# Patient Record
Sex: FEMALE | Race: WHITE | NOT HISPANIC OR LATINO | Employment: UNEMPLOYED | URBAN - METROPOLITAN AREA
[De-identification: names, ages, dates, MRNs, and addresses within clinical notes are randomized per-mention and may not be internally consistent; named-entity substitution may affect disease eponyms.]

---

## 2023-08-21 ENCOUNTER — TELEPHONE (OUTPATIENT)
Dept: PODIATRY | Facility: CLINIC | Age: 48
End: 2023-08-21
Payer: COMMERCIAL

## 2023-08-21 NOTE — TELEPHONE ENCOUNTER
Spoke with patient to help schedule appointment due to provider relocating to new site. Patient voice understanding to new appointment date time and location.

## 2023-09-11 ENCOUNTER — TELEPHONE (OUTPATIENT)
Dept: PODIATRY | Facility: CLINIC | Age: 48
End: 2023-09-11
Payer: COMMERCIAL

## 2023-09-11 NOTE — TELEPHONE ENCOUNTER
Called patient to make them aware that Dr. Dale will be in the [place] office not [place].  Left patient a message on her voicemail at 9/11/23 @ 3:22.     Kristen

## 2023-09-13 ENCOUNTER — TELEPHONE (OUTPATIENT)
Dept: PODIATRY | Facility: CLINIC | Age: 48
End: 2023-09-13

## 2023-09-13 ENCOUNTER — OFFICE VISIT (OUTPATIENT)
Dept: PODIATRY | Facility: CLINIC | Age: 48
End: 2023-09-13
Payer: COMMERCIAL

## 2023-09-13 ENCOUNTER — PATIENT MESSAGE (OUTPATIENT)
Dept: PODIATRY | Facility: CLINIC | Age: 48
End: 2023-09-13

## 2023-09-13 VITALS — HEIGHT: 67 IN | BODY MASS INDEX: 45.99 KG/M2 | WEIGHT: 293 LBS

## 2023-09-13 DIAGNOSIS — L60.0 INGROWN TOENAIL: Primary | ICD-10-CM

## 2023-09-13 PROCEDURE — 99999 PR PBB SHADOW E&M-EST. PATIENT-LVL III: CPT | Mod: PBBFAC,,, | Performed by: PODIATRIST

## 2023-09-13 PROCEDURE — 99999 PR PBB SHADOW E&M-EST. PATIENT-LVL III: ICD-10-PCS | Mod: PBBFAC,,, | Performed by: PODIATRIST

## 2023-09-13 PROCEDURE — 11765 NAIL REMOVAL: ICD-10-PCS | Mod: S$GLB,,, | Performed by: PODIATRIST

## 2023-09-13 PROCEDURE — 99213 OFFICE O/P EST LOW 20 MIN: CPT | Mod: 25,PBBFAC,PN | Performed by: PODIATRIST

## 2023-09-13 PROCEDURE — 99203 OFFICE O/P NEW LOW 30 MIN: CPT | Mod: 25,S$GLB,, | Performed by: PODIATRIST

## 2023-09-13 PROCEDURE — 99203 PR OFFICE/OUTPT VISIT, NEW, LEVL III, 30-44 MIN: ICD-10-PCS | Mod: 25,S$GLB,, | Performed by: PODIATRIST

## 2023-09-13 PROCEDURE — 11765 WEDGE EXCISION SKN NAIL FOLD: CPT | Mod: TA,PBBFAC,PN | Performed by: PODIATRIST

## 2023-09-13 RX ORDER — ALBUTEROL SULFATE 90 UG/1
2 AEROSOL, METERED RESPIRATORY (INHALATION) 2 TIMES DAILY PRN
COMMUNITY
Start: 2023-05-25

## 2023-09-13 RX ORDER — MONTELUKAST SODIUM 10 MG/1
10 TABLET ORAL DAILY
COMMUNITY
Start: 2023-05-24

## 2023-09-13 RX ORDER — ROSUVASTATIN CALCIUM 20 MG/1
20 TABLET, COATED ORAL DAILY
COMMUNITY
Start: 2023-05-24

## 2023-09-13 RX ORDER — OMEPRAZOLE 40 MG/1
40 CAPSULE, DELAYED RELEASE ORAL DAILY
COMMUNITY
Start: 2023-05-24

## 2023-09-13 NOTE — PATIENT INSTRUCTIONS
Instructions for Care after Ingrown Nail removal    General Information: Stay off your feet as much as possible today. You may wear a surgical shoe, sandal or any open toed shoe that does not squeeze, constrict or put pressure on your toe(s). Your toe(s) may remain numb for up to 2-24 hours after the procedure. Although most patients can wear a closed loose fitting shoe after the first week, the toe will heal faster the more you use the open toed shoe in the first 2-3  weeks. Please contact our office if you have any questions or concerns.    Bleeding: Slight bleeding, discoloration and drainage are normal. Due to the chemical used there may be some yellow-clear drainage coming from the toe for 2-3 weeks.    Discomfort: You can elevate your foot to help alleviate minor swelling, bleeding and discomfort. You may also take Advil, Tylenol or other over the counter pain medications to help alleviate pain. Call our office if the pain is not well controlled. Most patients have very little discomfort as long as they minimize their walking for the first 24 hours and do not bump the toe.    Removing the Bandage: Starting the day after the procedure, carefully remove the dressing and shower or bathe as normal. It is Ok to get the bandage soaking wet in the shower and when you remove it, it should not stick to the surgery site.    Dressing Options- Traditional Method:  1. Soaking two times a day in WARM water with Epsom salts or diluted Povidone Iodine  (Betadine) for 15-20 minutes. You will need to purchase these products from the pharmacy.  2. Dry toe then apply an antibiotic cream or ointment such as Neosporin or Polysporin plus or  Garamycin and cover with a 2 x 2 inch size gauze and then secure with a 1 inch band aid.  3. In the second week, take the dressing off at bedtime to air dry the toe.  4. If the toe is infected take the Antibiotic Pills as directed until finished.      Ingrown Toenail        What Is an Ingrown  Mood at 3 with 10 being the worse and found hearing others imput as most helpful today    Easily distracted     No SI    Has appropriate goals and to return to IOP tomorrow   Toenail?    When a toenail is ingrown, it is curved and grows into the skin, usually at the nail borders (the sides of the nail). This digging in of the nail irritates the skin, often creating pain, redness, swelling and warmth in the toe.    If an ingrown nail causes a break in the skin, bacteria may enter and cause an infection in the area, which is often marked by drainage and a foul odor. However, even if the toe is not painful, red, swollen or warm, a nail that curves downward into the skin can progress to an infection.    Ingrown toenail and normal toenail    Causes  Causes of ingrown toenails include:    Heredity. In many people, the tendency for ingrown toenails is inherited.  Trauma. Sometimes an ingrown toenail is the result of trauma, such as stubbing your toe, having an object fall on your toe or engaging in activities that involve repeated pressure on the toes, such as kicking or running.  Improper trimming. The most common cause of ingrown toenails is cutting your nails too short. This encourages the skin next to the nail to fold over the nail.   Improperly sized footwear. Ingrown toenails can result from wearing socks and shoes that are tight or short.  Nail conditions. Ingrown toenails can be caused by nail problems, such as fungal infections or losing a nail due to trauma.     Treatment  Sometimes initial treatment for ingrown toenails can be safely performed at home. However, home treatment is strongly discouraged if an infection is suspected or for those who have medical conditions that put feet at high risk, such as diabetes, nerve damage in the foot or poor circulation.        Ingrown toenail before and after treatment    Home Care  If you do not have an infection or any of the above medical conditions, you can soak your foot in room-temperature water (adding Epsom salt may be recommended by your doctor) and gently massage the side of the nail fold to help reduce the inflammation.    Avoid  attempting bathroom surgery. Repeated cutting of the nail can cause the condition to worsen over time. If your symptoms fail to improve, it is time to see a foot and ankle surgeon.    Physician Care  After examining the toe, the foot and ankle surgeon will select the treatment best suited for you. If an infection is present, an oral antibiotic may be prescribed.    Sometimes a minor surgical procedure, often performed in the office, will ease the pain and remove the offending nail. After applying a local anesthetic, the doctor removes part of the nails side border. Some nails may become ingrown again, requiring removal of the nail root.    Following the nail procedure, a light bandage will be applied. Most people experience very little pain after surgery and may resume normal activity the next day. If your surgeon has prescribed an oral antibiotic, be sure to take all the medication, even if your symptoms have improved.    Preventing Ingrown Toenails  Many cases of ingrown toenails may be prevented by:    Proper trimming. Cut toenails in a fairly straight line, and do not cut them too short. You should be able to get your fingernail under the sides and end of the nail.  Well-fitting shoes and socks. Do not wear shoes that are short or tight in the toe area. Avoid shoes that are loose because they too cause pressure on the toes, especially when running or walking briskly.               What You Should Know About Home Treatment  Do not cut a notch in the nail. Contrary to what some people believe, this does not reduce the tendency for the nail to curve downward.  Do not repeatedly trim nail borders. Repeated trimming does not change the way the nail grows and can make the condition worse.  Do not place cotton under the nail. Not only does this not relieve the pain, it provides a place for harmful bacteria to grow, resulting in infection.  Over-the-counter medications are ineffective. Topical medications may mask  the pain, but they do not correct the underlying problem.        Understanding Ingrown Toenails    An ingrown nail is the result of a nail growing into the skin that surrounds it. This often occurs at either edge of the big toe. Ingrown nails may be caused by improper trimming, inherited nail deformities, injuries, fungal infections, or pressure.  Symptoms  Ingrown nails may cause pain at the tip of the toe or all the way to the base of the toe. The pain is often worse while walking. An ingrown nail may also lead to infection, inflammation, or a more serious condition. If its infected, you might see pus or redness.  Evaluation  To determine the extent of your problem, your healthcare provider examines and possibly presses the painful area. If other problems are suspected, blood tests, cultures, and X-rays may be done as well.  Treatment  If the nail isnt infected, your healthcare provider may trim the corner of it to help relieve your symptoms. He or she may need to remove one side of your nail back to the cuticle. The base of the nail may then be treated with a chemical to keep the ingrown part from growing back. Severe infections or ingrown nails may require antibiotics and temporary or permanent removal of a portion of the nail. To prevent pain, a local anesthetic may be used in these procedures. This treatment is usually done at your healthcare provider's office.  Prevention  Many nail problems can be prevented by wearing the right shoes and trimming your nails properly. To help avoid infection, keep your feet clean and dry. If you have diabetes, talk with your healthcare provider before doing any foot self-care.  The right shoes: Get your feet measured (your size may change as you age). Wear shoes that are supportive and roomy enough for your toes to wiggle. Look for shoes made of natural materials such as leather, which allow your feet to breathe.  Proper trimming: To avoid problems, trim your toenails  straight across without cutting down into the corners. If you cant trim your own nails, ask your healthcare provider to do so for you.  Date Last Reviewed: 10/1/2016  © 4134-0810 [employer]. [address] [place] [address], [place] [address]. All rights reserved. This information is not intended as a substitute for professional medical care. Always follow your healthcare professional's instructions.

## 2023-09-13 NOTE — PROGRESS NOTES
[facility] - PODIATRY  [address], [address]  [place] [address]  Dept: [phone]  Dept Fax: [phone]    Hunter Acharya Jr., DPM     Assessment:   MDM    Coding  1. Ingrown toenail - Left Foot  Nail Removal          Plan:     Nail Removal    Date/Time: 9/13/2023 8:00 AM    Performed by: Hunter Acharya Jr., DPM  Authorized by: Hunter Acharya Jr., DPM    Consent Done?:  Yes (Verbal)  Time out: Immediately prior to the procedure a time out was called    Prep: patient was prepped and draped in usual sterile fashion    Location:     Location:  Left foot  Anesthesia:     Anesthesia:  Digital block    Local anesthetic:  Bupivacaine 0.5% without epinephrine    Anesthetic total (ml):  2  Procedure Details:     Preparation:  Skin prepped with alcohol, skin prepped with Betadine and sterile field established    Amount removed:  Complete    Wedge excision of skin of nail fold: Yes      Nail bed sutured?: No      Nail matrix removed:  None    Removed nail replaced and anchored: No      Dressing applied:  4x4, antibiotic ointment, gauze roll, petrolatum-impregnated gauze and dressing applied    Patient tolerance:  Patient tolerated the procedure well with no immediate complications      Erika was seen today for ingrown toenail.    Diagnoses and all orders for this visit:    Ingrown toenail - Left Foot  -     Nail Removal        -pt seen, evaluated, and managed  -dx discussed in detail. All questions/concerns addressed  -all tx options discussed. All alternatives, risks, benefits of all txs discussed  -The patient was educated regarding the above diagnosis.   -discussed ingrowing toenails and all tx options. Pt opts for avuslion  -px as above  -pt to perform epsom salt or betadine soaks once daily x 2wks. Written instructions dispensed  -keep toe covered with triple abx ointment + bandaid x 2wks  -given h/o recurrent ingrown, we discussed matrix px. Pt to think over  options    Rx dispensed: none  Referrals: none  -WB: wbat      Follow up in about 3 weeks (around 10/4/2023).    Subjective:      Patient ID: Erika Jenkins is a 48 y.o. female.    Chief Complaint:   Chief Complaint   Patient presents with    Ingrown Toenail     Left and right great toe pain  Left great toe ingrown  Right toe nail had an accident and it never grown properly        CC - ingrown nail: Patient presents to the clinic complaining of painful ingrown toenail on the left foot. Patients rates pain 7/10 on pain scale. patient seeking tx options.    HPI    Last Podiatry Enc: Visit date not found  Last Enc w/ Me: Visit date not found    Outside reports reviewed: historical medical records.  Family hx: as below  No past medical history on file.  No past surgical history on file.  No family history on file.  Current Outpatient Medications   Medication Sig Dispense Refill    albuterol (PROVENTIL/VENTOLIN HFA) 90 mcg/actuation inhaler Inhale 2 puffs into the lungs 2 (two) times daily as needed.      montelukast (SINGULAIR) 10 mg tablet Take 10 mg by mouth once daily.      omeprazole (PRILOSEC) 40 MG capsule Take 40 mg by mouth once daily.      rosuvastatin (CRESTOR) 20 MG tablet Take 20 mg by mouth once daily.       No current facility-administered medications for this visit.     Review of patient's allergies indicates:   Allergen Reactions    Codeine     Penicillins Rash     Social History     Socioeconomic History    Marital status: Single       ROS    REVIEW OF SYSTEMS: Negative as documented below as well as positive findings in bold.       Constitutional  Respiratory  Gastrointestinal  Skin   - Fever - Cough - Heartburn - Rash   - Chills - Spit blood - Nausea - Itching   - Weight Loss - Shortness of breath - Vomiting - Nail pain   - Malaise/Fatigue - Wheezing - Abdominal Pain  Wound/Ulcer   - Weight Gain   - Blood in Stool  Poor wound healing       - Diarrhea          Cardiovascular  Genitourinary  Neurological   "HEENT   - Chest Pain - Dysuria - Burning Sensation of feet - Headache   - Palpitations - Hematuria - Tingling / Paresthesia - Congestion   - Pain at night in legs - Flank Pain - Dizziness - Sore Throat   - Cramping   - Tremor - Blurred Vision   - Leg Swelling   - Sensory Change - Double Vision   - Dizzy when standing   - Speech Change - Eye Redness       - Focal Weakness - Dry Eyes       - Loss of Consciousness          Endocrine  Musculoskeletal  Psychiatric   - Cold intolerance - Muscle Pain - Depression   - Heat intolerance - Neck Pain - Insomnia   - Anemia - Joint Pain - Memory Loss   -  Easy bruising, bleeding - Heel pain - Anxiety      Toe Pain        Leg/Ankle/Foot Pain         Objective:     Ht 5' 7" (1.702 m)   Wt (!) 145.6 kg (321 lb)   BMI 50.28 kg/m²   Vitals:    09/13/23 0813   Weight: (!) 145.6 kg (321 lb)   Height: 5' 7" (1.702 m)   PainSc:   5   PainLoc: Foot       Physical Exam    General Appearance:   Patient appears well developed, well nourished  Patient appears stated age    Psychiatric:   Patient is oriented to time, place, and person.  Patient has appropriate mood and affect    Neck:  Trachea Midline  No visible masses    Respiratory/Ears:  No distress or labored breathing.  Able to differentiate between normal talking voice and whisper.  Able to follow commands    Eyes:  Visual Acuity intact  Lids and conjunctivae normal. No discoloration noted.    Foot Exam  Physical Exam  Ortho Exam  Ortho/SPM Exam  Physical Exam  Neurologic Exam    L LE exam con't:  V:  DP 2/4, PT 2/4   CRT< 3s to all digits tested   Tibial and popliteal lymph nodes are w/o abnormality        N:  Patient displays normal ankle reflexes   SILT in SP/DP/T/Yolis/Saph distributions    Ortho: +Motor EHL/FHL/TA/GA   +TTP L great toe  Compartments soft/compressible. No pain on passive stretch of big toe. No calf  Pain.    Derm:  skin intact, skin warm and dry, skin without ulcers or lesions, skin without induration, left, great " toe ingrowing and incurvated     Imaging / Labs:      No results found.      Note: This was dictated using a computer transcription program. Although proofread, it may contain computer transcription errors and phonetic errors. Other human proofreading errors may also exist. Corrections may be performed at a later time. Please contact us for any clarification if needed.    Hunter Acharya DPM  [facility]

## 2023-09-13 NOTE — LETTER
September 13, 2023      [place] - Podiatry  [address], [address]  [place] [address]  Phone: [phone]  Fax: [phone]       Patient: Erika Jenkins   YOB: 1975  Date of Visit: 09/13/2023    To Whom It May Concern:    Velia Jenkins  was at [facility] on 09/13/2023. The patient may return to work  09/14/2023 on  with no restrictions. If you have any questions or concerns, or if I can be of further assistance, please do not hesitate to contact me.    Sincerely,    Kristen Olson MA

## 2023-10-04 ENCOUNTER — OFFICE VISIT (OUTPATIENT)
Dept: PODIATRY | Facility: CLINIC | Age: 48
End: 2023-10-04
Payer: COMMERCIAL

## 2023-10-04 VITALS — WEIGHT: 293 LBS | BODY MASS INDEX: 45.99 KG/M2 | HEIGHT: 67 IN

## 2023-10-04 DIAGNOSIS — L60.0 INGROWN TOENAIL OF RIGHT FOOT: ICD-10-CM

## 2023-10-04 DIAGNOSIS — L60.0 INGROWN TOENAIL: Primary | ICD-10-CM

## 2023-10-04 PROCEDURE — 11750 NAIL REMOVAL: ICD-10-PCS | Mod: 51,TA,S$PBB, | Performed by: PODIATRIST

## 2023-10-04 PROCEDURE — 99213 OFFICE O/P EST LOW 20 MIN: CPT | Mod: PBBFAC,25,PN | Performed by: PODIATRIST

## 2023-10-04 PROCEDURE — 99213 OFFICE O/P EST LOW 20 MIN: CPT | Mod: 25,S$PBB,, | Performed by: PODIATRIST

## 2023-10-04 PROCEDURE — 99999 PR PBB SHADOW E&M-EST. PATIENT-LVL III: ICD-10-PCS | Mod: PBBFAC,,, | Performed by: PODIATRIST

## 2023-10-04 PROCEDURE — 99213 PR OFFICE/OUTPT VISIT, EST, LEVL III, 20-29 MIN: ICD-10-PCS | Mod: 25,S$PBB,, | Performed by: PODIATRIST

## 2023-10-04 PROCEDURE — 99999 PR PBB SHADOW E&M-EST. PATIENT-LVL III: CPT | Mod: PBBFAC,,, | Performed by: PODIATRIST

## 2023-10-04 PROCEDURE — 11750 EXCISION NAIL&NAIL MATRIX: CPT | Mod: PBBFAC,PN | Performed by: PODIATRIST

## 2023-10-04 RX ORDER — DOXYCYCLINE 100 MG/1
100 CAPSULE ORAL 2 TIMES DAILY
Qty: 14 CAPSULE | Refills: 0 | Status: SHIPPED | OUTPATIENT
Start: 2023-10-04 | End: 2023-10-11

## 2023-10-04 RX ORDER — MELOXICAM 7.5 MG/1
7.5 TABLET ORAL DAILY
Qty: 21 TABLET | Refills: 0 | Status: SHIPPED | OUTPATIENT
Start: 2023-10-04 | End: 2023-10-25

## 2023-10-04 NOTE — PROGRESS NOTES
[facility] - PODIATRY  [address], [address]  [place] [address]  Dept: [phone]  Dept Fax: [phone]    Hunter Acharya Jr., DPM     Assessment:   MDM    Coding  1. Ingrown toenail - Left Foot  Nail Removal    meloxicam (MOBIC) 7.5 MG tablet      2. Ingrown toenail of right foot  Nail Removal    meloxicam (MOBIC) 7.5 MG tablet          Plan:     Nail Removal    Date/Time: 10/4/2023 9:46 AM    Performed by: Hunter Acharya Jr., DPM  Authorized by: Hunter Acharya Jr., DPM    Consent Done?:  Yes (Verbal)  Time out: Immediately prior to the procedure a time out was called    Location:     Location:  Left foot    Location detail:  Left big toe  Anesthesia:     Anesthesia:  Digital block    Local anesthetic:  Bupivacaine 0.5% without epinephrine    Anesthetic total (ml):  4  Procedure Details:     Preparation:  Skin prepped with alcohol    Wedge excision of skin of nail fold: No      Nail bed sutured?: No      Nail matrix removed:  Complete    Removal method:  Phenol and alcohol    Removed nail replaced and anchored: No      Dressing applied:  4x4, gauze roll, Xeroform gauze, dressing applied and antibiotic ointment    Patient tolerance:  Patient tolerated the procedure well with no immediate complications  Nail Removal    Date/Time: 10/4/2023 9:30 AM    Performed by: Hunter Acharya Jr., DPM  Authorized by: Hunter Acharya Jr., DPM    Consent Done?:  Yes (Verbal)  Time out: Immediately prior to the procedure a time out was called    Prep: patient was prepped and draped in usual sterile fashion    Location:     Location:  Right foot    Location detail:  Right big toe  Anesthesia:     Anesthesia:  Digital block    Local anesthetic:  Bupivacaine 0.5% without epinephrine    Anesthetic total (ml):  4  Procedure Details:     Preparation:  Skin prepped with alcohol, skin prepped with Betadine and sterile field established    Amount removed:  Complete    Wedge  excision of skin of nail fold: No      Nail bed sutured?: No      Nail matrix removed:  Complete    Removal method:  Phenol and alcohol    Removed nail replaced and anchored: No      Dressing applied:  4x4, antibiotic ointment, gauze roll, petrolatum-impregnated gauze and dressing applied    Patient tolerance:  Patient tolerated the procedure well with no immediate complications      Erika was seen today for ingrown toenail.    Diagnoses and all orders for this visit:    Ingrown toenail - Left Foot  -     Nail Removal  -     meloxicam (MOBIC) 7.5 MG tablet; Take 1 tablet (7.5 mg total) by mouth once daily. for 21 days    Ingrown toenail of right foot  -     Nail Removal  -     meloxicam (MOBIC) 7.5 MG tablet; Take 1 tablet (7.5 mg total) by mouth once daily. for 21 days        -pt seen, evaluated, and managed  -dx discussed in detail. All questions/concerns addressed  -all tx options discussed. All alternatives, risks, benefits of all txs discussed  -The patient was educated regarding the above diagnosis.   -discussed ingrowing toenails and all tx options. Pt opts for matrix px L and R  -px as above  -pt to perform epsom salt or betadine soaks once daily x 2wks. Written instructions dispensed  -keep toe covered with triple abx ointment + bandaid x 2wks      Rx dispensed: mobic  Referrals: none  -WB: wbat      Follow up in about 3 weeks (around 10/25/2023).    Subjective:      Patient ID: Erika Jenkins is a 48 y.o. female.    Chief Complaint:   Chief Complaint   Patient presents with    Ingrown Toenail     Left        CC - ingrown nail: Patient presents to the clinic complaining of painful ingrown toenail on the left foot. Patients rates pain 7/10 on pain scale. patient seeking tx options.    10/4/23:  Hx as above. S/p L hallux nail avulsion. Reports she wants nail removal to be permanent. Also wants R nail removed        Last Podiatry Enc: Visit date not found  Last Enc w/ Me: Visit date not found    Outside reports  reviewed: historical medical records.  Family hx: as below  No past medical history on file.  No past surgical history on file.  No family history on file.  Current Outpatient Medications   Medication Sig Dispense Refill    albuterol (PROVENTIL/VENTOLIN HFA) 90 mcg/actuation inhaler Inhale 2 puffs into the lungs 2 (two) times daily as needed.      montelukast (SINGULAIR) 10 mg tablet Take 10 mg by mouth once daily.      omeprazole (PRILOSEC) 40 MG capsule Take 40 mg by mouth once daily.      rosuvastatin (CRESTOR) 20 MG tablet Take 20 mg by mouth once daily.      meloxicam (MOBIC) 7.5 MG tablet Take 1 tablet (7.5 mg total) by mouth once daily. for 21 days 21 tablet 0     No current facility-administered medications for this visit.     Review of patient's allergies indicates:   Allergen Reactions    Codeine     Penicillins Rash     Social History     Socioeconomic History    Marital status: Single       ROS    REVIEW OF SYSTEMS: Negative as documented below as well as positive findings in bold.       Constitutional  Respiratory  Gastrointestinal  Skin   - Fever - Cough - Heartburn - Rash   - Chills - Spit blood - Nausea - Itching   - Weight Loss - Shortness of breath - Vomiting - Nail pain   - Malaise/Fatigue - Wheezing - Abdominal Pain  Wound/Ulcer   - Weight Gain   - Blood in Stool  Poor wound healing       - Diarrhea          Cardiovascular  Genitourinary  Neurological  HEENT   - Chest Pain - Dysuria - Burning Sensation of feet - Headache   - Palpitations - Hematuria - Tingling / Paresthesia - Congestion   - Pain at night in legs - Flank Pain - Dizziness - Sore Throat   - Cramping   - Tremor - Blurred Vision   - Leg Swelling   - Sensory Change - Double Vision   - Dizzy when standing   - Speech Change - Eye Redness       - Focal Weakness - Dry Eyes       - Loss of Consciousness          Endocrine  Musculoskeletal  Psychiatric   - Cold intolerance - Muscle Pain - Depression   - Heat intolerance - Neck Pain -  "Insomnia   - Anemia - Joint Pain - Memory Loss   -  Easy bruising, bleeding - Heel pain - Anxiety      Toe Pain        Leg/Ankle/Foot Pain         Objective:     Ht 5' 7" (1.702 m)   Wt (!) 146.2 kg (322 lb 5 oz)   BMI 50.48 kg/m²   Vitals:    10/04/23 0927   Weight: (!) 146.2 kg (322 lb 5 oz)   Height: 5' 7" (1.702 m)   PainSc:   5   PainLoc: Toe         Physical Exam    General Appearance:   Patient appears well developed, well nourished  Patient appears stated age    Psychiatric:   Patient is oriented to time, place, and person.  Patient has appropriate mood and affect    Neck:  Trachea Midline  No visible masses    Respiratory/Ears:  No distress or labored breathing.  Able to differentiate between normal talking voice and whisper.  Able to follow commands    Eyes:  Visual Acuity intact  Lids and conjunctivae normal. No discoloration noted.    Foot Exam  Physical Exam  Ortho Exam  Ortho/SPM Exam  Physical Exam  Neurologic Exam    L LE exam con't:  V:  DP 2/4, PT 2/4   CRT< 3s to all digits tested   Tibial and popliteal lymph nodes are w/o abnormality        N:  Patient displays normal ankle reflexes   SILT in SP/DP/T/Yolis/Saph distributions    Ortho: +Motor EHL/FHL/TA/GA   +TTP L great toe  Compartments soft/compressible. No pain on passive stretch of big toe. No calf  Pain.    Derm:  skin intact, skin warm and dry, skin without ulcers or lesions, skin without induration, left, great toe nail px site healing well w/o signs of infection    R LE exam con't:  V:  DP 2/4, PT 2/4   CRT< 3s to all digits tested   Tibial and popliteal lymph nodes are w/o abnormality      N:  Patient displays normal ankle reflexes   SILT in SP/DP/T/Yolis/Saph distributions    Ortho: +Motor EHL/FHL/TA/GA   +TTP R great toe  Compartments soft/compressible. No pain on passive stretch of big toe. No calf  Pain.    Derm:  skin intact, skin warm and dry, skin without ulcers or lesions, skin without induration, right, great toe ingrowing and " incurvated     Imaging / Labs:      No results found.      Note: This was dictated using a computer transcription program. Although proofread, it may contain computer transcription errors and phonetic errors. Other human proofreading errors may also exist. Corrections may be performed at a later time. Please contact us for any clarification if needed.    Hunter Acharya DPM  [facility]

## 2023-10-04 NOTE — PATIENT INSTRUCTIONS
Instructions for Care after Ingrown Nail removal    General Information: Stay off your feet as much as possible today. You may wear a surgical shoe, sandal or any open toed shoe that does not squeeze, constrict or put pressure on your toe(s). Your toe(s) may remain numb for up to 2-24 hours after the procedure. Although most patients can wear a closed loose fitting shoe after the first week, the toe will heal faster the more you use the open toed shoe in the first 2-3  weeks. Please contact our office if you have any questions or concerns.    Bleeding: Slight bleeding, discoloration and drainage are normal. Due to the chemical used there may be some yellow-clear drainage coming from the toe for 2-3 weeks.    Discomfort: You can elevate your foot to help alleviate minor swelling, bleeding and discomfort. You may also take Advil, Tylenol or other over the counter pain medications to help alleviate pain. Call our office if the pain is not well controlled. Most patients have very little discomfort as long as they minimize their walking for the first 24 hours and do not bump the toe.    Removing the Bandage: Starting the day after the procedure, carefully remove the dressing and shower or bathe as normal. It is Ok to get the bandage soaking wet in the shower and when you remove it, it should not stick to the surgery site.    Dressing Options- Traditional Method:  1. Soaking two times a day in WARM water with Epsom salts or diluted Povidone Iodine  (Betadine) for 15-20 minutes. You will need to purchase these products from the pharmacy.  2. Dry toe then apply an antibiotic cream or ointment such as Neosporin or Polysporin plus or  Garamycin and cover with a 2 x 2 inch size gauze and then secure with a 1 inch band aid.  3. In the second week, take the dressing off at bedtime to air dry the toe.  4. If the toe is infected take the Antibiotic Pills as directed until finished.      Ingrown Toenail        What Is an Ingrown  Toenail?    When a toenail is ingrown, it is curved and grows into the skin, usually at the nail borders (the sides of the nail). This digging in of the nail irritates the skin, often creating pain, redness, swelling and warmth in the toe.    If an ingrown nail causes a break in the skin, bacteria may enter and cause an infection in the area, which is often marked by drainage and a foul odor. However, even if the toe is not painful, red, swollen or warm, a nail that curves downward into the skin can progress to an infection.    Ingrown toenail and normal toenail    Causes  Causes of ingrown toenails include:    Heredity. In many people, the tendency for ingrown toenails is inherited.  Trauma. Sometimes an ingrown toenail is the result of trauma, such as stubbing your toe, having an object fall on your toe or engaging in activities that involve repeated pressure on the toes, such as kicking or running.  Improper trimming. The most common cause of ingrown toenails is cutting your nails too short. This encourages the skin next to the nail to fold over the nail.   Improperly sized footwear. Ingrown toenails can result from wearing socks and shoes that are tight or short.  Nail conditions. Ingrown toenails can be caused by nail problems, such as fungal infections or losing a nail due to trauma.     Treatment  Sometimes initial treatment for ingrown toenails can be safely performed at home. However, home treatment is strongly discouraged if an infection is suspected or for those who have medical conditions that put feet at high risk, such as diabetes, nerve damage in the foot or poor circulation.        Ingrown toenail before and after treatment    Home Care  If you do not have an infection or any of the above medical conditions, you can soak your foot in room-temperature water (adding Epsom salt may be recommended by your doctor) and gently massage the side of the nail fold to help reduce the inflammation.    Avoid  attempting bathroom surgery. Repeated cutting of the nail can cause the condition to worsen over time. If your symptoms fail to improve, it is time to see a foot and ankle surgeon.    Physician Care  After examining the toe, the foot and ankle surgeon will select the treatment best suited for you. If an infection is present, an oral antibiotic may be prescribed.    Sometimes a minor surgical procedure, often performed in the office, will ease the pain and remove the offending nail. After applying a local anesthetic, the doctor removes part of the nails side border. Some nails may become ingrown again, requiring removal of the nail root.    Following the nail procedure, a light bandage will be applied. Most people experience very little pain after surgery and may resume normal activity the next day. If your surgeon has prescribed an oral antibiotic, be sure to take all the medication, even if your symptoms have improved.    Preventing Ingrown Toenails  Many cases of ingrown toenails may be prevented by:    Proper trimming. Cut toenails in a fairly straight line, and do not cut them too short. You should be able to get your fingernail under the sides and end of the nail.  Well-fitting shoes and socks. Do not wear shoes that are short or tight in the toe area. Avoid shoes that are loose because they too cause pressure on the toes, especially when running or walking briskly.               What You Should Know About Home Treatment  Do not cut a notch in the nail. Contrary to what some people believe, this does not reduce the tendency for the nail to curve downward.  Do not repeatedly trim nail borders. Repeated trimming does not change the way the nail grows and can make the condition worse.  Do not place cotton under the nail. Not only does this not relieve the pain, it provides a place for harmful bacteria to grow, resulting in infection.  Over-the-counter medications are ineffective. Topical medications may mask  the pain, but they do not correct the underlying problem.        Understanding Ingrown Toenails    An ingrown nail is the result of a nail growing into the skin that surrounds it. This often occurs at either edge of the big toe. Ingrown nails may be caused by improper trimming, inherited nail deformities, injuries, fungal infections, or pressure.  Symptoms  Ingrown nails may cause pain at the tip of the toe or all the way to the base of the toe. The pain is often worse while walking. An ingrown nail may also lead to infection, inflammation, or a more serious condition. If its infected, you might see pus or redness.  Evaluation  To determine the extent of your problem, your healthcare provider examines and possibly presses the painful area. If other problems are suspected, blood tests, cultures, and X-rays may be done as well.  Treatment  If the nail isnt infected, your healthcare provider may trim the corner of it to help relieve your symptoms. He or she may need to remove one side of your nail back to the cuticle. The base of the nail may then be treated with a chemical to keep the ingrown part from growing back. Severe infections or ingrown nails may require antibiotics and temporary or permanent removal of a portion of the nail. To prevent pain, a local anesthetic may be used in these procedures. This treatment is usually done at your healthcare provider's office.  Prevention  Many nail problems can be prevented by wearing the right shoes and trimming your nails properly. To help avoid infection, keep your feet clean and dry. If you have diabetes, talk with your healthcare provider before doing any foot self-care.  The right shoes: Get your feet measured (your size may change as you age). Wear shoes that are supportive and roomy enough for your toes to wiggle. Look for shoes made of natural materials such as leather, which allow your feet to breathe.  Proper trimming: To avoid problems, trim your toenails  straight across without cutting down into the corners. If you cant trim your own nails, ask your healthcare provider to do so for you.  Date Last Reviewed: 10/1/2016  © 8019-2509 [employer]. [address] [place] [address], [place] [address]. All rights reserved. This information is not intended as a substitute for professional medical care. Always follow your healthcare professional's instructions.

## 2023-11-15 ENCOUNTER — OFFICE VISIT (OUTPATIENT)
Dept: PODIATRY | Facility: CLINIC | Age: 48
End: 2023-11-15
Payer: MEDICAID

## 2023-11-15 VITALS — BODY MASS INDEX: 45.99 KG/M2 | HEIGHT: 67 IN | WEIGHT: 293 LBS

## 2023-11-15 DIAGNOSIS — L60.0 INGROWN TOENAIL: Primary | ICD-10-CM

## 2023-11-15 DIAGNOSIS — L60.0 INGROWN TOENAIL OF RIGHT FOOT: ICD-10-CM

## 2023-11-15 PROCEDURE — 99213 PR OFFICE/OUTPT VISIT, EST, LEVL III, 20-29 MIN: ICD-10-PCS | Mod: S$PBB,,, | Performed by: PODIATRIST

## 2023-11-15 PROCEDURE — 99999 PR PBB SHADOW E&M-EST. PATIENT-LVL III: CPT | Mod: PBBFAC,,, | Performed by: PODIATRIST

## 2023-11-15 PROCEDURE — 1160F PR REVIEW ALL MEDS BY PRESCRIBER/CLIN PHARMACIST DOCUMENTED: ICD-10-PCS | Mod: CPTII,,, | Performed by: PODIATRIST

## 2023-11-15 PROCEDURE — 3008F BODY MASS INDEX DOCD: CPT | Mod: CPTII,,, | Performed by: PODIATRIST

## 2023-11-15 PROCEDURE — 4010F ACE/ARB THERAPY RXD/TAKEN: CPT | Mod: CPTII,,, | Performed by: PODIATRIST

## 2023-11-15 PROCEDURE — 99213 OFFICE O/P EST LOW 20 MIN: CPT | Mod: PBBFAC,PN | Performed by: PODIATRIST

## 2023-11-15 PROCEDURE — 1159F MED LIST DOCD IN RCRD: CPT | Mod: CPTII,,, | Performed by: PODIATRIST

## 2023-11-15 PROCEDURE — 1160F RVW MEDS BY RX/DR IN RCRD: CPT | Mod: CPTII,,, | Performed by: PODIATRIST

## 2023-11-15 PROCEDURE — 1159F PR MEDICATION LIST DOCUMENTED IN MEDICAL RECORD: ICD-10-PCS | Mod: CPTII,,, | Performed by: PODIATRIST

## 2023-11-15 PROCEDURE — 99999 PR PBB SHADOW E&M-EST. PATIENT-LVL III: ICD-10-PCS | Mod: PBBFAC,,, | Performed by: PODIATRIST

## 2023-11-15 PROCEDURE — 99213 OFFICE O/P EST LOW 20 MIN: CPT | Mod: S$PBB,,, | Performed by: PODIATRIST

## 2023-11-15 PROCEDURE — 4010F PR ACE/ARB THEARPY RXD/TAKEN: ICD-10-PCS | Mod: CPTII,,, | Performed by: PODIATRIST

## 2023-11-15 PROCEDURE — 3008F PR BODY MASS INDEX (BMI) DOCUMENTED: ICD-10-PCS | Mod: CPTII,,, | Performed by: PODIATRIST

## 2023-11-15 NOTE — PATIENT INSTRUCTIONS
Instructions for Care after Ingrown Nail removal    General Information: Stay off your feet as much as possible today. You may wear a surgical shoe, sandal or any open toed shoe that does not squeeze, constrict or put pressure on your toe(s). Your toe(s) may remain numb for up to 2-24 hours after the procedure. Although most patients can wear a closed loose fitting shoe after the first week, the toe will heal faster the more you use the open toed shoe in the first 2-3  weeks. Please contact our office if you have any questions or concerns.    Bleeding: Slight bleeding, discoloration and drainage are normal. Due to the chemical used there may be some yellow-clear drainage coming from the toe for 2-3 weeks.    Discomfort: You can elevate your foot to help alleviate minor swelling, bleeding and discomfort. You may also take Advil, Tylenol or other over the counter pain medications to help alleviate pain. Call our office if the pain is not well controlled. Most patients have very little discomfort as long as they minimize their walking for the first 24 hours and do not bump the toe.    Removing the Bandage: Starting the day after the procedure, carefully remove the dressing and shower or bathe as normal. It is Ok to get the bandage soaking wet in the shower and when you remove it, it should not stick to the surgery site.    Dressing Options- Traditional Method:  1. Soaking two times a day in WARM water with Epsom salts or diluted Povidone Iodine  (Betadine) for 15-20 minutes. You will need to purchase these products from the pharmacy.  2. Dry toe then apply an antibiotic cream or ointment such as Neosporin or Polysporin plus or  Garamycin and cover with a 2 x 2 inch size gauze and then secure with a 1 inch band aid.  3. In the second week, take the dressing off at bedtime to air dry the toe.  4. If the toe is infected take the Antibiotic Pills as directed until finished.      Ingrown Toenail        What Is an Ingrown  Toenail?    When a toenail is ingrown, it is curved and grows into the skin, usually at the nail borders (the sides of the nail). This digging in of the nail irritates the skin, often creating pain, redness, swelling and warmth in the toe.    If an ingrown nail causes a break in the skin, bacteria may enter and cause an infection in the area, which is often marked by drainage and a foul odor. However, even if the toe is not painful, red, swollen or warm, a nail that curves downward into the skin can progress to an infection.    Ingrown toenail and normal toenail    Causes  Causes of ingrown toenails include:    Heredity. In many people, the tendency for ingrown toenails is inherited.  Trauma. Sometimes an ingrown toenail is the result of trauma, such as stubbing your toe, having an object fall on your toe or engaging in activities that involve repeated pressure on the toes, such as kicking or running.  Improper trimming. The most common cause of ingrown toenails is cutting your nails too short. This encourages the skin next to the nail to fold over the nail.   Improperly sized footwear. Ingrown toenails can result from wearing socks and shoes that are tight or short.  Nail conditions. Ingrown toenails can be caused by nail problems, such as fungal infections or losing a nail due to trauma.     Treatment  Sometimes initial treatment for ingrown toenails can be safely performed at home. However, home treatment is strongly discouraged if an infection is suspected or for those who have medical conditions that put feet at high risk, such as diabetes, nerve damage in the foot or poor circulation.        Ingrown toenail before and after treatment    Home Care  If you do not have an infection or any of the above medical conditions, you can soak your foot in room-temperature water (adding Epsom salt may be recommended by your doctor) and gently massage the side of the nail fold to help reduce the inflammation.    Avoid  attempting bathroom surgery. Repeated cutting of the nail can cause the condition to worsen over time. If your symptoms fail to improve, it is time to see a foot and ankle surgeon.    Physician Care  After examining the toe, the foot and ankle surgeon will select the treatment best suited for you. If an infection is present, an oral antibiotic may be prescribed.    Sometimes a minor surgical procedure, often performed in the office, will ease the pain and remove the offending nail. After applying a local anesthetic, the doctor removes part of the nails side border. Some nails may become ingrown again, requiring removal of the nail root.    Following the nail procedure, a light bandage will be applied. Most people experience very little pain after surgery and may resume normal activity the next day. If your surgeon has prescribed an oral antibiotic, be sure to take all the medication, even if your symptoms have improved.    Preventing Ingrown Toenails  Many cases of ingrown toenails may be prevented by:    Proper trimming. Cut toenails in a fairly straight line, and do not cut them too short. You should be able to get your fingernail under the sides and end of the nail.  Well-fitting shoes and socks. Do not wear shoes that are short or tight in the toe area. Avoid shoes that are loose because they too cause pressure on the toes, especially when running or walking briskly.               What You Should Know About Home Treatment  Do not cut a notch in the nail. Contrary to what some people believe, this does not reduce the tendency for the nail to curve downward.  Do not repeatedly trim nail borders. Repeated trimming does not change the way the nail grows and can make the condition worse.  Do not place cotton under the nail. Not only does this not relieve the pain, it provides a place for harmful bacteria to grow, resulting in infection.  Over-the-counter medications are ineffective. Topical medications may mask  the pain, but they do not correct the underlying problem.        Understanding Ingrown Toenails    An ingrown nail is the result of a nail growing into the skin that surrounds it. This often occurs at either edge of the big toe. Ingrown nails may be caused by improper trimming, inherited nail deformities, injuries, fungal infections, or pressure.  Symptoms  Ingrown nails may cause pain at the tip of the toe or all the way to the base of the toe. The pain is often worse while walking. An ingrown nail may also lead to infection, inflammation, or a more serious condition. If its infected, you might see pus or redness.  Evaluation  To determine the extent of your problem, your healthcare provider examines and possibly presses the painful area. If other problems are suspected, blood tests, cultures, and X-rays may be done as well.  Treatment  If the nail isnt infected, your healthcare provider may trim the corner of it to help relieve your symptoms. He or she may need to remove one side of your nail back to the cuticle. The base of the nail may then be treated with a chemical to keep the ingrown part from growing back. Severe infections or ingrown nails may require antibiotics and temporary or permanent removal of a portion of the nail. To prevent pain, a local anesthetic may be used in these procedures. This treatment is usually done at your healthcare provider's office.  Prevention  Many nail problems can be prevented by wearing the right shoes and trimming your nails properly. To help avoid infection, keep your feet clean and dry. If you have diabetes, talk with your healthcare provider before doing any foot self-care.  The right shoes: Get your feet measured (your size may change as you age). Wear shoes that are supportive and roomy enough for your toes to wiggle. Look for shoes made of natural materials such as leather, which allow your feet to breathe.  Proper trimming: To avoid problems, trim your toenails  straight across without cutting down into the corners. If you cant trim your own nails, ask your healthcare provider to do so for you.  Date Last Reviewed: 10/1/2016  © 6473-7031 [employer]. [address] [place] [address], [place] [address]. All rights reserved. This information is not intended as a substitute for professional medical care. Always follow your healthcare professional's instructions.

## 2023-11-15 NOTE — PROGRESS NOTES
[facility] - PODIATRY  [address], [address]  [place] [address]  Dept: [phone]  Dept Fax: [phone]    Hunter Acharya Jr., DPM     Assessment:   MDM    Coding  1. Ingrown toenail - Left Foot        2. Ingrown toenail of right foot            Plan:     Procedures    Diagnoses and all orders for this visit:    Ingrown toenail - Left Foot    Ingrown toenail of right foot        -pt seen, evaluated, and managed  -dx discussed in detail. All questions/concerns addressed  -all tx options discussed. All alternatives, risks, benefits of all txs discussed  -The patient was educated regarding the above diagnosis.   -discussed ingrowing toenails and all tx options. Focused on prevention going fwd  -manually curetted nail px sites and covered with trpl abx ointment + bandaid  -pt to perform epsom salt or betadine soaks once daily x 2wks. Written instructions dispensed  -keep toe covered with triple abx ointment + bandaid x 2wks      Rx dispensed: none  Referrals: none  -WB: wbat      Follow up if symptoms worsen or fail to improve.    Subjective:      Patient ID: Erika Jenkins is a 48 y.o. female.    Chief Complaint:   No chief complaint on file.      CC - ingrown nail: Patient presents to the clinic complaining of painful ingrown toenail on the left foot. Patients rates pain 7/10 on pain scale. patient seeking tx options.    11/15/23:  Hx as above. S/p b/l hallux matrixectomy.        Last Podiatry Enc: Visit date not found  Last Enc w/ Me: Visit date not found    Outside reports reviewed: historical medical records.  Family hx: as below  No past medical history on file.  No past surgical history on file.  No family history on file.  Current Outpatient Medications   Medication Sig Dispense Refill    albuterol (PROVENTIL/VENTOLIN HFA) 90 mcg/actuation inhaler Inhale 2 puffs into the lungs 2 (two) times daily as needed.      montelukast (SINGULAIR) 10 mg tablet Take 10 mg by  mouth once daily.      omeprazole (PRILOSEC) 40 MG capsule Take 40 mg by mouth once daily.      rosuvastatin (CRESTOR) 20 MG tablet Take 20 mg by mouth once daily.       No current facility-administered medications for this visit.     Review of patient's allergies indicates:   Allergen Reactions    Codeine     Penicillins Rash     Social History     Socioeconomic History    Marital status: Single       ROS    REVIEW OF SYSTEMS: Negative as documented below as well as positive findings in bold.       Constitutional  Respiratory  Gastrointestinal  Skin   - Fever - Cough - Heartburn - Rash   - Chills - Spit blood - Nausea - Itching   - Weight Loss - Shortness of breath - Vomiting - Nail pain   - Malaise/Fatigue - Wheezing - Abdominal Pain  Wound/Ulcer   - Weight Gain   - Blood in Stool  Poor wound healing       - Diarrhea          Cardiovascular  Genitourinary  Neurological  HEENT   - Chest Pain - Dysuria - Burning Sensation of feet - Headache   - Palpitations - Hematuria - Tingling / Paresthesia - Congestion   - Pain at night in legs - Flank Pain - Dizziness - Sore Throat   - Cramping   - Tremor - Blurred Vision   - Leg Swelling   - Sensory Change - Double Vision   - Dizzy when standing   - Speech Change - Eye Redness       - Focal Weakness - Dry Eyes       - Loss of Consciousness          Endocrine  Musculoskeletal  Psychiatric   - Cold intolerance - Muscle Pain - Depression   - Heat intolerance - Neck Pain - Insomnia   - Anemia - Joint Pain - Memory Loss   -  Easy bruising, bleeding - Heel pain - Anxiety      Toe Pain        Leg/Ankle/Foot Pain         Objective:     There were no vitals taken for this visit.  There were no vitals filed for this visit.        Physical Exam    General Appearance:   Patient appears well developed, well nourished  Patient appears stated age    Psychiatric:   Patient is oriented to time, place, and person.  Patient has appropriate mood and affect    Neck:  Trachea Midline  No visible  masses    Respiratory/Ears:  No distress or labored breathing.  Able to differentiate between normal talking voice and whisper.  Able to follow commands    Eyes:  Visual Acuity intact  Lids and conjunctivae normal. No discoloration noted.    Foot Exam  Physical Exam  Ortho Exam  Ortho/SPM Exam  Physical Exam  Neurologic Exam    L LE exam con't:  V:  DP 2/4, PT 2/4   CRT< 3s to all digits tested   Tibial and popliteal lymph nodes are w/o abnormality        N:  Patient displays normal ankle reflexes   SILT in SP/DP/T/Yolis/Saph distributions    Ortho: +Motor EHL/FHL/TA/GA   +TTP L great toe  Compartments soft/compressible. No pain on passive stretch of big toe. No calf  Pain.    Derm:  skin intact, skin warm and dry, skin without ulcers or lesions, skin without induration, left, great toe nail px site healing well w/o signs of infection    R LE exam con't:  V:  DP 2/4, PT 2/4   CRT< 3s to all digits tested   Tibial and popliteal lymph nodes are w/o abnormality      N:  Patient displays normal ankle reflexes   SILT in SP/DP/T/Yolis/Saph distributions    Ortho: +Motor EHL/FHL/TA/GA   +TTP R great toe  Compartments soft/compressible. No pain on passive stretch of big toe. No calf  Pain.    Derm:  skin intact, skin warm and dry, skin without ulcers or lesions, skin without induration, right, great toe ingrowing and incurvated     Imaging / Labs:      No results found.      Note: This was dictated using a computer transcription program. Although proofread, it may contain computer transcription errors and phonetic errors. Other human proofreading errors may also exist. Corrections may be performed at a later time. Please contact us for any clarification if needed.    Hunter Acharya,  TATYANA  [facility]

## 2024-03-15 ENCOUNTER — TELEPHONE (OUTPATIENT)
Dept: OBSTETRICS AND GYNECOLOGY | Facility: CLINIC | Age: 49
End: 2024-03-15

## 2024-03-15 ENCOUNTER — LAB VISIT (OUTPATIENT)
Dept: LAB | Facility: HOSPITAL | Age: 49
End: 2024-03-15
Attending: OBSTETRICS & GYNECOLOGY
Payer: MEDICAID

## 2024-03-15 ENCOUNTER — OFFICE VISIT (OUTPATIENT)
Dept: OBSTETRICS AND GYNECOLOGY | Facility: CLINIC | Age: 49
End: 2024-03-15
Payer: MEDICAID

## 2024-03-15 VITALS — SYSTOLIC BLOOD PRESSURE: 140 MMHG | BODY MASS INDEX: 47.89 KG/M2 | DIASTOLIC BLOOD PRESSURE: 90 MMHG | WEIGHT: 293 LBS

## 2024-03-15 DIAGNOSIS — N93.9 ABNORMAL UTERINE BLEEDING: Primary | ICD-10-CM

## 2024-03-15 DIAGNOSIS — N93.9 ABNORMAL UTERINE BLEEDING: ICD-10-CM

## 2024-03-15 DIAGNOSIS — Z12.31 SCREENING MAMMOGRAM FOR BREAST CANCER: ICD-10-CM

## 2024-03-15 DIAGNOSIS — R23.2 HOT FLASHES: ICD-10-CM

## 2024-03-15 DIAGNOSIS — N94.6 DYSMENORRHEA: ICD-10-CM

## 2024-03-15 LAB
BASOPHILS # BLD AUTO: 0.08 K/UL (ref 0–0.2)
BASOPHILS NFR BLD: 1 % (ref 0–1.9)
DIFFERENTIAL METHOD BLD: ABNORMAL
EOSINOPHIL # BLD AUTO: 0.7 K/UL (ref 0–0.5)
EOSINOPHIL NFR BLD: 8.7 % (ref 0–8)
ERYTHROCYTE [DISTWIDTH] IN BLOOD BY AUTOMATED COUNT: 12.5 % (ref 11.5–14.5)
ESTRADIOL SERPL-MCNC: 41 PG/ML
FSH SERPL-ACNC: 7.55 MIU/ML
HCT VFR BLD AUTO: 40.7 % (ref 37–48.5)
HGB BLD-MCNC: 13.9 G/DL (ref 12–16)
IMM GRANULOCYTES # BLD AUTO: 0.03 K/UL (ref 0–0.04)
IMM GRANULOCYTES NFR BLD AUTO: 0.4 % (ref 0–0.5)
LYMPHOCYTES # BLD AUTO: 1.7 K/UL (ref 1–4.8)
LYMPHOCYTES NFR BLD: 21.8 % (ref 18–48)
MCH RBC QN AUTO: 32 PG (ref 27–31)
MCHC RBC AUTO-ENTMCNC: 34.2 G/DL (ref 32–36)
MCV RBC AUTO: 94 FL (ref 82–98)
MONOCYTES # BLD AUTO: 0.5 K/UL (ref 0.3–1)
MONOCYTES NFR BLD: 6.6 % (ref 4–15)
NEUTROPHILS # BLD AUTO: 4.9 K/UL (ref 1.8–7.7)
NEUTROPHILS NFR BLD: 61.5 % (ref 38–73)
NRBC BLD-RTO: 0 /100 WBC
PLATELET # BLD AUTO: 315 K/UL (ref 150–450)
PMV BLD AUTO: 9.9 FL (ref 9.2–12.9)
RBC # BLD AUTO: 4.35 M/UL (ref 4–5.4)
WBC # BLD AUTO: 7.9 K/UL (ref 3.9–12.7)

## 2024-03-15 PROCEDURE — 85025 COMPLETE CBC W/AUTO DIFF WBC: CPT | Performed by: OBSTETRICS & GYNECOLOGY

## 2024-03-15 PROCEDURE — 83001 ASSAY OF GONADOTROPIN (FSH): CPT | Performed by: OBSTETRICS & GYNECOLOGY

## 2024-03-15 PROCEDURE — 82670 ASSAY OF TOTAL ESTRADIOL: CPT | Performed by: OBSTETRICS & GYNECOLOGY

## 2024-03-15 PROCEDURE — 99999 PR PBB SHADOW E&M-EST. PATIENT-LVL III: CPT | Mod: PBBFAC,,, | Performed by: OBSTETRICS & GYNECOLOGY

## 2024-03-15 PROCEDURE — 36415 COLL VENOUS BLD VENIPUNCTURE: CPT | Mod: PN | Performed by: OBSTETRICS & GYNECOLOGY

## 2024-03-15 PROCEDURE — 99204 OFFICE O/P NEW MOD 45 MIN: CPT | Mod: S$PBB,,, | Performed by: OBSTETRICS & GYNECOLOGY

## 2024-03-15 PROCEDURE — 88175 CYTOPATH C/V AUTO FLUID REDO: CPT | Performed by: OBSTETRICS & GYNECOLOGY

## 2024-03-15 NOTE — TELEPHONE ENCOUNTER
Patient has excuse from another physician.      ----- Message from Charity Tripp sent at 3/15/2024 10:32 AM CDT -----  Pt left without excuse for work if it could be emailed to her or she will come to clinic for hard copy please call to advise  [phone]

## 2024-03-15 NOTE — PROGRESS NOTES
Chief Complaint   Patient presents with    Vaginal Bleeding     C/O last week bleeding heavily, lasting 8 days, this happened 2.5 weeks after finishing period. C/O a lot of pain, clotting. 8-10 tampons per day. C/O hot flashes, sweating mostly at night.    Pelvic Pain       History of Present Illness   48 y.o. WF   patient presents today for eval of heavy, painful menses.  Para 5, .  BTL.  Menses heavy and closer together and more painful.  Last exam > 5 yrs    Past medical and surgical history reviewed.   I have reviewed the patient's medical history in detail and updated the computerized patient record.    Review of patient's allergies indicates:   Allergen Reactions    Codeine     Penicillins Rash         Review of Systems -   GEN ROS: positive for  - hot flashes  Breast ROS: negative for breast lumps  Genito-Urinary ROS: heavy menses      Physical Examination:  BP (!) 140/90   Wt (!) 138.7 kg (305 lb 12.5 oz)   LMP 2024   BMI 47.89 kg/m²      OBGyn Exam   Abd: non tender, no rebound, no guarding, no organomegaly  V,v: no lesions  Cervix: no lesions  Uterus: nssp  Adnexa: no masses,  tender midline  Assessment:    1. Abnormal uterine bleeding  Liquid-Based Pap Smear, Screening    US Pelvis Comp with Transvag NON-OB (xpd    CBC Auto Differential    Estradiol    FOLLICLE STIMULATING HORMONE      2. Dysmenorrhea  US Pelvis Comp with Transvag NON-OB (xpd    CBC Auto Differential    Estradiol    FOLLICLE STIMULATING HORMONE      3. Hot flashes  US Pelvis Comp with Transvag NON-OB (xpd    CBC Auto Differential    Estradiol    FOLLICLE STIMULATING HORMONE          Plan:  Labs  Pap  Sono and ov  MMG  Patient informed will be contacted with results within 2 weeks. Encouraged to please call back or email if she has not heard from us by then.

## 2024-04-03 ENCOUNTER — OFFICE VISIT (OUTPATIENT)
Dept: OBSTETRICS AND GYNECOLOGY | Facility: CLINIC | Age: 49
End: 2024-04-03
Payer: MEDICAID

## 2024-04-03 ENCOUNTER — HOSPITAL ENCOUNTER (OUTPATIENT)
Dept: RADIOLOGY | Facility: HOSPITAL | Age: 49
Discharge: HOME | End: 2024-04-03
Attending: OBSTETRICS & GYNECOLOGY
Payer: MEDICAID

## 2024-04-03 VITALS — WEIGHT: 293 LBS | SYSTOLIC BLOOD PRESSURE: 138 MMHG | BODY MASS INDEX: 47.82 KG/M2 | DIASTOLIC BLOOD PRESSURE: 86 MMHG

## 2024-04-03 DIAGNOSIS — R23.2 HOT FLASHES: ICD-10-CM

## 2024-04-03 DIAGNOSIS — N93.9 ABNORMAL UTERINE BLEEDING: Primary | ICD-10-CM

## 2024-04-03 DIAGNOSIS — N93.9 ABNORMAL UTERINE BLEEDING: ICD-10-CM

## 2024-04-03 DIAGNOSIS — N94.6 DYSMENORRHEA: ICD-10-CM

## 2024-04-03 PROCEDURE — 3008F BODY MASS INDEX DOCD: CPT | Mod: CPTII,,, | Performed by: OBSTETRICS & GYNECOLOGY

## 2024-04-03 PROCEDURE — 3075F SYST BP GE 130 - 139MM HG: CPT | Mod: CPTII,,, | Performed by: OBSTETRICS & GYNECOLOGY

## 2024-04-03 PROCEDURE — 76830 TRANSVAGINAL US NON-OB: CPT | Mod: 26,,, | Performed by: RADIOLOGY

## 2024-04-03 PROCEDURE — 76856 US EXAM PELVIC COMPLETE: CPT | Mod: TC,PN

## 2024-04-03 PROCEDURE — 76830 TRANSVAGINAL US NON-OB: CPT | Mod: TC,PN

## 2024-04-03 PROCEDURE — 99214 OFFICE O/P EST MOD 30 MIN: CPT | Mod: S$PBB,,, | Performed by: OBSTETRICS & GYNECOLOGY

## 2024-04-03 PROCEDURE — 76856 US EXAM PELVIC COMPLETE: CPT | Mod: 26,,, | Performed by: RADIOLOGY

## 2024-04-03 PROCEDURE — 99999 PR PBB SHADOW E&M-EST. PATIENT-LVL III: CPT | Mod: PBBFAC,,, | Performed by: OBSTETRICS & GYNECOLOGY

## 2024-04-03 PROCEDURE — 96372 THER/PROPH/DIAG INJ SC/IM: CPT | Mod: PBBFAC,PN

## 2024-04-03 PROCEDURE — 99213 OFFICE O/P EST LOW 20 MIN: CPT | Mod: PBBFAC,25,PN | Performed by: OBSTETRICS & GYNECOLOGY

## 2024-04-03 PROCEDURE — 99999PBSHW PR PBB SHADOW TECHNICAL ONLY FILED TO HB: Mod: PBBFAC,,,

## 2024-04-03 PROCEDURE — 1159F MED LIST DOCD IN RCRD: CPT | Mod: CPTII,,, | Performed by: OBSTETRICS & GYNECOLOGY

## 2024-04-03 PROCEDURE — 3079F DIAST BP 80-89 MM HG: CPT | Mod: CPTII,,, | Performed by: OBSTETRICS & GYNECOLOGY

## 2024-04-03 PROCEDURE — 1160F RVW MEDS BY RX/DR IN RCRD: CPT | Mod: CPTII,,, | Performed by: OBSTETRICS & GYNECOLOGY

## 2024-04-03 RX ORDER — MEDROXYPROGESTERONE ACETATE 150 MG/ML
150 INJECTION, SUSPENSION INTRAMUSCULAR
Status: COMPLETED | OUTPATIENT
Start: 2024-04-03 | End: 2024-04-03

## 2024-04-03 RX ADMIN — MEDROXYPROGESTERONE ACETATE 150 MG: 150 INJECTION, SUSPENSION INTRAMUSCULAR at 01:04

## 2024-04-03 NOTE — PROGRESS NOTES
Chief Complaint   Patient presents with    Follow-up     F/U gyn sono       History of Present Illness   49 y.o. WF   patient presents today for abnormal uterine bleeding, irreg menses    Past medical and surgical history reviewed.   I have reviewed the patient's medical history in detail and updated the computerized patient record.    Review of patient's allergies indicates:   Allergen Reactions    Codeine     Penicillins Rash         Review of Systems -   GEN ROS: negative  Breast ROS: negative  Genito-Urinary ROS: abnl menses      Physical Examination:  /86   Wt (!) 138.5 kg (305 lb 5.4 oz)   LMP 2024   BMI 47.82 kg/m²    Sono: left ovary not visualized  Tr ovary nl  Uterus normal size      Assessment:    1. Abnormal uterine bleeding        2. Dysmenorrhea            Plan:  Discuss options trial  Depo provera 150mg q mos x 3  EmBx next month with depo shot  Patient informed will be contacted with results within 2 weeks. Encouraged to please call back or email if she has not heard from us by then.

## 2024-04-09 ENCOUNTER — PATIENT MESSAGE (OUTPATIENT)
Dept: OBSTETRICS AND GYNECOLOGY | Facility: CLINIC | Age: 49
End: 2024-04-09
Payer: MEDICAID

## 2024-04-09 RX ORDER — NAPROXEN SODIUM 550 MG/1
550 TABLET ORAL 2 TIMES DAILY WITH MEALS
Qty: 30 TABLET | Refills: 1 | Status: SHIPPED | OUTPATIENT
Start: 2024-04-09 | End: 2024-05-01 | Stop reason: ALTCHOICE

## 2024-05-01 ENCOUNTER — OFFICE VISIT (OUTPATIENT)
Dept: OBSTETRICS AND GYNECOLOGY | Facility: CLINIC | Age: 49
End: 2024-05-01
Payer: MEDICAID

## 2024-05-01 VITALS — WEIGHT: 293 LBS | SYSTOLIC BLOOD PRESSURE: 142 MMHG | BODY MASS INDEX: 47.79 KG/M2 | DIASTOLIC BLOOD PRESSURE: 88 MMHG

## 2024-05-01 DIAGNOSIS — N94.6 DYSMENORRHEA: ICD-10-CM

## 2024-05-01 DIAGNOSIS — N93.9 ABNORMAL UTERINE BLEEDING: ICD-10-CM

## 2024-05-01 DIAGNOSIS — Z01.812 PRE-PROCEDURE LAB EXAM: Primary | ICD-10-CM

## 2024-05-01 DIAGNOSIS — Z30.9 ENCOUNTER FOR CONTRACEPTIVE MANAGEMENT, UNSPECIFIED TYPE: ICD-10-CM

## 2024-05-01 LAB
B-HCG UR QL: NEGATIVE
CTP QC/QA: YES

## 2024-05-01 PROCEDURE — 99213 OFFICE O/P EST LOW 20 MIN: CPT | Mod: PBBFAC,PN | Performed by: OBSTETRICS & GYNECOLOGY

## 2024-05-01 PROCEDURE — 58100 BIOPSY OF UTERUS LINING: CPT | Mod: S$PBB,,, | Performed by: OBSTETRICS & GYNECOLOGY

## 2024-05-01 PROCEDURE — 58100 BIOPSY OF UTERUS LINING: CPT | Mod: PBBFAC,PN | Performed by: OBSTETRICS & GYNECOLOGY

## 2024-05-01 PROCEDURE — 99999PBSHW POCT URINE PREGNANCY: Mod: PBBFAC,,,

## 2024-05-01 PROCEDURE — 81025 URINE PREGNANCY TEST: CPT | Mod: PBBFAC,PN | Performed by: OBSTETRICS & GYNECOLOGY

## 2024-05-01 PROCEDURE — 99499 UNLISTED E&M SERVICE: CPT | Mod: S$PBB,,, | Performed by: OBSTETRICS & GYNECOLOGY

## 2024-05-01 PROCEDURE — 99999 PR PBB SHADOW E&M-EST. PATIENT-LVL III: CPT | Mod: PBBFAC,,, | Performed by: OBSTETRICS & GYNECOLOGY

## 2024-05-01 PROCEDURE — 88305 TISSUE EXAM BY PATHOLOGIST: CPT | Performed by: PATHOLOGY

## 2024-05-01 PROCEDURE — 99999PBSHW PR PBB SHADOW TECHNICAL ONLY FILED TO HB: Mod: PBBFAC,,,

## 2024-05-01 PROCEDURE — 88305 TISSUE EXAM BY PATHOLOGIST: CPT | Mod: 26,,, | Performed by: PATHOLOGY

## 2024-05-01 PROCEDURE — 96372 THER/PROPH/DIAG INJ SC/IM: CPT | Mod: PBBFAC,59,PN

## 2024-05-01 RX ORDER — MEDROXYPROGESTERONE ACETATE 150 MG/ML
150 INJECTION, SUSPENSION INTRAMUSCULAR
Status: COMPLETED | OUTPATIENT
Start: 2024-05-01 | End: 2024-05-01

## 2024-05-01 RX ORDER — IBUPROFEN 800 MG/1
800 TABLET ORAL 3 TIMES DAILY
Qty: 30 TABLET | Refills: 1 | Status: SHIPPED | OUTPATIENT
Start: 2024-05-01

## 2024-05-01 RX ADMIN — MEDROXYPROGESTERONE ACETATE 150 MG: 150 INJECTION, SUSPENSION INTRAMUSCULAR at 03:05

## 2024-05-01 NOTE — PROGRESS NOTES
Endometrial biopsy:  5/1/2024   Patient was prepped and draped in the usual fashion after verbal consent was obtained. Cervix was cleaned with betadine and endometrial pipelle was passed to a depth of 8 cm without difficulty with moderate return of tissue.  Additional instrumentation needed: none   Tolerated well, specimen sent to pathology.    Patient informed will be contacted with results within 2 weeks. Encouraged to please call back or email if she has not heard from us by then.  Depo provera 150mg IM today and repeat q mos x 3 then q 3mos if working  Motrin 800mg

## 2024-05-03 LAB
FINAL PATHOLOGIC DIAGNOSIS: NORMAL
GROSS: NORMAL
Lab: NORMAL

## 2024-06-06 ENCOUNTER — TELEPHONE (OUTPATIENT)
Dept: OBSTETRICS AND GYNECOLOGY | Facility: CLINIC | Age: 49
End: 2024-06-06
Payer: MEDICAID

## 2024-06-07 ENCOUNTER — CLINICAL SUPPORT (OUTPATIENT)
Dept: OBSTETRICS AND GYNECOLOGY | Facility: CLINIC | Age: 49
End: 2024-06-07
Payer: MEDICAID

## 2024-06-07 DIAGNOSIS — N93.9 ABNORMAL UTERINE BLEEDING: Primary | ICD-10-CM

## 2024-06-07 PROCEDURE — 99999PBSHW PR PBB SHADOW TECHNICAL ONLY FILED TO HB: Mod: PBBFAC,,,

## 2024-06-07 RX ORDER — MEDROXYPROGESTERONE ACETATE 150 MG/ML
150 INJECTION, SUSPENSION INTRAMUSCULAR
Status: COMPLETED | OUTPATIENT
Start: 2024-06-07 | End: 2024-06-07

## 2024-06-07 RX ADMIN — MEDROXYPROGESTERONE ACETATE 150 MG: 150 INJECTION, SUSPENSION INTRAMUSCULAR at 08:06

## 2024-06-07 NOTE — PROGRESS NOTES
Patient presented to clinic for Depo-Provera.  Name, , and allergies were verified and reviewed.  Patient was administered injection and no complications noted.  Will call to schedule next appointment.

## 2024-07-02 ENCOUNTER — OFFICE VISIT (OUTPATIENT)
Dept: OBSTETRICS AND GYNECOLOGY | Facility: CLINIC | Age: 49
End: 2024-07-02
Payer: MEDICAID

## 2024-07-02 ENCOUNTER — PATIENT MESSAGE (OUTPATIENT)
Dept: OBSTETRICS AND GYNECOLOGY | Facility: CLINIC | Age: 49
End: 2024-07-02

## 2024-07-02 VITALS — BODY MASS INDEX: 46.68 KG/M2 | WEIGHT: 293 LBS | DIASTOLIC BLOOD PRESSURE: 84 MMHG | SYSTOLIC BLOOD PRESSURE: 132 MMHG

## 2024-07-02 DIAGNOSIS — N94.6 DYSMENORRHEA: ICD-10-CM

## 2024-07-02 DIAGNOSIS — N93.9 ABNORMAL UTERINE BLEEDING: Primary | ICD-10-CM

## 2024-07-02 PROCEDURE — 3079F DIAST BP 80-89 MM HG: CPT | Mod: CPTII,,, | Performed by: OBSTETRICS & GYNECOLOGY

## 2024-07-02 PROCEDURE — 3075F SYST BP GE 130 - 139MM HG: CPT | Mod: CPTII,,, | Performed by: OBSTETRICS & GYNECOLOGY

## 2024-07-02 PROCEDURE — 3008F BODY MASS INDEX DOCD: CPT | Mod: CPTII,,, | Performed by: OBSTETRICS & GYNECOLOGY

## 2024-07-02 PROCEDURE — 99999PBSHW PR PBB SHADOW TECHNICAL ONLY FILED TO HB: Mod: PBBFAC,,,

## 2024-07-02 PROCEDURE — 99213 OFFICE O/P EST LOW 20 MIN: CPT | Mod: S$PBB,,, | Performed by: OBSTETRICS & GYNECOLOGY

## 2024-07-02 PROCEDURE — 1159F MED LIST DOCD IN RCRD: CPT | Mod: CPTII,,, | Performed by: OBSTETRICS & GYNECOLOGY

## 2024-07-02 PROCEDURE — 99213 OFFICE O/P EST LOW 20 MIN: CPT | Mod: PBBFAC,PN | Performed by: OBSTETRICS & GYNECOLOGY

## 2024-07-02 PROCEDURE — 99999 PR PBB SHADOW E&M-EST. PATIENT-LVL III: CPT | Mod: PBBFAC,,, | Performed by: OBSTETRICS & GYNECOLOGY

## 2024-07-02 PROCEDURE — 1160F RVW MEDS BY RX/DR IN RCRD: CPT | Mod: CPTII,,, | Performed by: OBSTETRICS & GYNECOLOGY

## 2024-07-02 PROCEDURE — 4010F ACE/ARB THERAPY RXD/TAKEN: CPT | Mod: CPTII,,, | Performed by: OBSTETRICS & GYNECOLOGY

## 2024-07-02 RX ORDER — DICYCLOMINE HYDROCHLORIDE 10 MG/1
30 CAPSULE ORAL
COMMUNITY
Start: 2024-06-06

## 2024-07-02 RX ORDER — PROMETHAZINE HYDROCHLORIDE 25 MG/1
5 TABLET ORAL
COMMUNITY
Start: 2024-06-06

## 2024-07-02 RX ORDER — LISINOPRIL 10 MG/1
10 TABLET ORAL
COMMUNITY
Start: 2024-04-30

## 2024-07-02 RX ORDER — ONDANSETRON 4 MG/1
12 TABLET, ORALLY DISINTEGRATING ORAL
COMMUNITY
Start: 2024-06-06

## 2024-07-02 RX ORDER — MEDROXYPROGESTERONE ACETATE 150 MG/ML
150 INJECTION, SUSPENSION INTRAMUSCULAR
Status: COMPLETED | OUTPATIENT
Start: 2024-07-02 | End: 2024-07-02

## 2024-07-02 RX ORDER — LINACLOTIDE 145 UG/1
90 CAPSULE, GELATIN COATED ORAL
COMMUNITY
Start: 2024-06-06

## 2024-07-02 RX ADMIN — MEDROXYPROGESTERONE ACETATE 150 MG: 150 INJECTION, SUSPENSION INTRAMUSCULAR at 04:07

## 2024-07-02 NOTE — PROGRESS NOTES
Patient was in clinic for visit with Dr. Salas to discuss surgical options.  Dr. Salas ordered Depo-Provera injection to try before patient make a decision on surgical procedure.  Depo injection was administered and there were no complications from injection.  Patient waited in clinic for 15 minutes and no adverse reactions.

## 2024-07-02 NOTE — PROGRESS NOTES
Chief Complaint   Patient presents with    Consult     Pt would like to discuss surgery. Pt states 3 Depo shots have been given here in the clinic. Pt has been having lower abdomen cramping since yesterday with tender breasts x 3 days.        History of Present Illness   49 y.o. WF   patient presents today for abnl menses and dysmen despite depo.  Desires surgical options    Past medical and surgical history reviewed.   I have reviewed the patient's medical history in detail and updated the computerized patient record.    Review of patient's allergies indicates:   Allergen Reactions    Codeine     Penicillins Rash         Review of Systems -   GEN ROS: negative  Breast ROS: negative for breast lumps  Genito-Urinary ROS: dysmen      Physical Examination:  /84   Wt 135.2 kg (298 lb 1 oz)   LMP 2024 (Approximate) Comment: February or March per pt.  BMI 46.68 kg/m²          Assessment:    1. Abnormal uterine bleeding  CBC Auto Differential    CANCER ANTIGEN 125      2. Dysmenorrhea  CBC Auto Differential    CANCER ANTIGEN 125          Plan:  Depo today  Labs  Considering Hyst  Patient informed will be contacted with results within 2 weeks. Encouraged to please call back or email if she has not heard from us by then.

## 2024-07-03 ENCOUNTER — LAB VISIT (OUTPATIENT)
Dept: LAB | Facility: HOSPITAL | Age: 49
End: 2024-07-03
Attending: OBSTETRICS & GYNECOLOGY
Payer: MEDICAID

## 2024-07-03 DIAGNOSIS — N94.6 DYSMENORRHEA: ICD-10-CM

## 2024-07-03 DIAGNOSIS — N93.9 ABNORMAL UTERINE BLEEDING: ICD-10-CM

## 2024-07-03 LAB
BASOPHILS # BLD AUTO: 0.05 K/UL (ref 0–0.2)
BASOPHILS NFR BLD: 0.8 % (ref 0–1.9)
CANCER AG125 SERPL-ACNC: 17 U/ML (ref 0–30)
DIFFERENTIAL METHOD BLD: ABNORMAL
EOSINOPHIL # BLD AUTO: 0.5 K/UL (ref 0–0.5)
EOSINOPHIL NFR BLD: 7.5 % (ref 0–8)
ERYTHROCYTE [DISTWIDTH] IN BLOOD BY AUTOMATED COUNT: 12.6 % (ref 11.5–14.5)
HCT VFR BLD AUTO: 43.2 % (ref 37–48.5)
HGB BLD-MCNC: 14.7 G/DL (ref 12–16)
IMM GRANULOCYTES # BLD AUTO: 0.01 K/UL (ref 0–0.04)
IMM GRANULOCYTES NFR BLD AUTO: 0.2 % (ref 0–0.5)
LYMPHOCYTES # BLD AUTO: 1.9 K/UL (ref 1–4.8)
LYMPHOCYTES NFR BLD: 31.6 % (ref 18–48)
MCH RBC QN AUTO: 32 PG (ref 27–31)
MCHC RBC AUTO-ENTMCNC: 34 G/DL (ref 32–36)
MCV RBC AUTO: 94 FL (ref 82–98)
MONOCYTES # BLD AUTO: 0.6 K/UL (ref 0.3–1)
MONOCYTES NFR BLD: 10.6 % (ref 4–15)
NEUTROPHILS # BLD AUTO: 3 K/UL (ref 1.8–7.7)
NEUTROPHILS NFR BLD: 49.3 % (ref 38–73)
NRBC BLD-RTO: 0 /100 WBC
PLATELET # BLD AUTO: 316 K/UL (ref 150–450)
PMV BLD AUTO: 10.1 FL (ref 9.2–12.9)
RBC # BLD AUTO: 4.6 M/UL (ref 4–5.4)
WBC # BLD AUTO: 6.01 K/UL (ref 3.9–12.7)

## 2024-07-03 PROCEDURE — 85025 COMPLETE CBC W/AUTO DIFF WBC: CPT | Performed by: OBSTETRICS & GYNECOLOGY

## 2024-07-03 PROCEDURE — 86304 IMMUNOASSAY TUMOR CA 125: CPT | Performed by: OBSTETRICS & GYNECOLOGY

## 2024-07-03 PROCEDURE — 36415 COLL VENOUS BLD VENIPUNCTURE: CPT | Mod: PN | Performed by: OBSTETRICS & GYNECOLOGY

## 2024-07-09 ENCOUNTER — PATIENT MESSAGE (OUTPATIENT)
Dept: OBSTETRICS AND GYNECOLOGY | Facility: CLINIC | Age: 49
End: 2024-07-09
Payer: MEDICAID

## 2024-07-09 NOTE — TELEPHONE ENCOUNTER
Spoke with patient and she will see her PCP next week to talk about weight loss medication and wants to know how many pounds she needs to lose?  Also after surgery will she be started on hormones?  Please advise.

## 2024-07-10 RX ORDER — NAPROXEN SODIUM 550 MG/1
550 TABLET ORAL 2 TIMES DAILY WITH MEALS
Qty: 30 TABLET | Refills: 1 | Status: CANCELLED | OUTPATIENT
Start: 2024-07-10

## 2024-07-17 PROBLEM — L40.4 PSORIASIS, GUTTATE: Status: ACTIVE | Noted: 2024-07-17

## 2024-07-17 PROBLEM — K21.9 GASTROESOPHAGEAL REFLUX DISEASE WITHOUT ESOPHAGITIS: Status: ACTIVE | Noted: 2024-07-17

## 2024-07-17 PROBLEM — L40.8 INVERSE PSORIASIS: Status: ACTIVE | Noted: 2024-07-17

## 2024-07-17 PROBLEM — R73.03 PREDIABETES: Status: ACTIVE | Noted: 2024-07-17

## 2024-07-17 PROBLEM — K58.2 IRRITABLE BOWEL SYNDROME WITH BOTH CONSTIPATION AND DIARRHEA: Status: ACTIVE | Noted: 2024-07-17

## 2024-07-17 PROBLEM — I10 PRIMARY HYPERTENSION: Status: ACTIVE | Noted: 2024-07-17

## 2024-07-17 PROBLEM — Z00.00 WELLNESS EXAMINATION: Status: ACTIVE | Noted: 2024-07-17

## 2024-07-17 PROBLEM — E78.5 DYSLIPIDEMIA: Status: ACTIVE | Noted: 2024-07-17

## 2024-07-17 PROBLEM — J45.20 MILD INTERMITTENT ASTHMA WITHOUT COMPLICATION: Status: ACTIVE | Noted: 2024-07-17

## 2024-07-17 PROBLEM — E66.01 MORBID OBESITY WITH BMI OF 45.0-49.9, ADULT: Status: ACTIVE | Noted: 2024-07-17

## 2024-07-25 ENCOUNTER — TELEPHONE (OUTPATIENT)
Dept: ENDOCRINOLOGY | Facility: CLINIC | Age: 49
End: 2024-07-25
Payer: MEDICAID

## 2024-07-25 PROBLEM — E21.0 PRIMARY HYPERPARATHYROIDISM: Status: ACTIVE | Noted: 2024-07-25

## 2024-07-25 PROBLEM — E83.52 HYPERCALCEMIA: Status: ACTIVE | Noted: 2024-07-25

## 2024-07-25 NOTE — TELEPHONE ENCOUNTER
----- Message from Vaibhav Joseph DO sent at 7/25/2024 10:08 AM CDT -----  yes  ----- Message -----  From: Layne Gibson LPN  Sent: 7/25/2024  10:07 AM CDT  To: Vaibhav Joseph, DO    Ok to override for medicaid?  ----- Message -----  From: Vaibhav Joseph DO  Sent: 7/25/2024  10:03 AM CDT  To: Layne Gibson LPN    Can you put her in Mon?  ----- Message -----  From: Layne Gibson LPN  Sent: 7/25/2024   9:57 AM CDT  To: Vaibhav Joseph, DO    Can you pls look at pt's chart and advise? I've already spoken w/ pt and the doctor's office is calling now about getting pt in. Pt has medicaid.  ----- Message -----  From: Juanpablo Reyes  Sent: 7/25/2024   9:52 AM CDT  To: #    Type:  Sooner Appointment Request    Caller is requesting a sooner appointment.  Caller declined first available appointment listed below.  Caller will not accept being placed on the waitlist and is requesting a message be sent to doctor.    Name of Caller:  Viridiana with  Dr. Choudhary [facility]   When is the first available appointment?  Na   Symptoms:  referral placed pt needs urgent appt   Would the patient rather a call back or a response via [facility]? Call back   Best Call Back Number:   [phone] ext  Additional Information:  Viridiana stated she is calling on behalf of provider and pt asking to be advised on getting pt an appt asap please ensure to call back asap thanks!

## 2024-07-25 NOTE — TELEPHONE ENCOUNTER
Called office back and spoke with Steffi and left message that Ms. El is not accepting pt primary insurance at this time.

## 2024-07-25 NOTE — TELEPHONE ENCOUNTER
Pt advised Dr. Joseph just booked in for Mon, 7/29. Appt scheduled with Dr. Joseph at 3 pm on 7/29.

## 2024-07-25 NOTE — TELEPHONE ENCOUNTER
----- Message from Erika Munoz sent at 7/24/2024  4:14 PM CDT -----  Regarding: appt request  Contact: pt  Type: Appointment Request    Caller is requesting an appointment.      Name of Caller:pt    When is the first available appointment?none    Symptoms:hypothyroidism, muscle pain, stomach pain, joint pain, vomitting, ED visit, high calcium level.     Would the patient rather a call back or a response via [facility]? Call back    Best Call Back Number:[phone]    Additional Information: pt has a referral

## 2024-07-25 NOTE — TELEPHONE ENCOUNTER
----- Message from Juanpablo Reyes sent at 7/25/2024  9:48 AM CDT -----  Type:  Sooner Appointment Request    Caller is requesting a sooner appointment.  Caller declined first available appointment listed below.  Caller will not accept being placed on the waitlist and is requesting a message be sent to doctor.    Name of Caller:  Viridiana with  [facility]   When is the first available appointment?  Na   Symptoms:  referral placed pt needs urgent appt   Would the patient rather a call back or a response via [facility]? Call back   Best Call Back Number:   [phone] ext  Additional Information:  Viridiana stated she is calling on behalf of provider and pt asking to be advised on getting pt an appt asap please ensure to call back asap thanks!

## 2024-07-29 ENCOUNTER — OFFICE VISIT (OUTPATIENT)
Dept: ENDOCRINOLOGY | Facility: CLINIC | Age: 49
End: 2024-07-29
Payer: MEDICAID

## 2024-07-29 ENCOUNTER — LAB VISIT (OUTPATIENT)
Dept: LAB | Facility: HOSPITAL | Age: 49
End: 2024-07-29
Attending: INTERNAL MEDICINE
Payer: MEDICAID

## 2024-07-29 VITALS
WEIGHT: 293 LBS | DIASTOLIC BLOOD PRESSURE: 62 MMHG | HEART RATE: 83 BPM | OXYGEN SATURATION: 97 % | BODY MASS INDEX: 45.99 KG/M2 | HEIGHT: 67 IN | SYSTOLIC BLOOD PRESSURE: 118 MMHG

## 2024-07-29 DIAGNOSIS — I10 HYPERTENSION, UNSPECIFIED TYPE: ICD-10-CM

## 2024-07-29 DIAGNOSIS — E21.0 PRIMARY HYPERPARATHYROIDISM: Primary | ICD-10-CM

## 2024-07-29 DIAGNOSIS — E21.0 PRIMARY HYPERPARATHYROIDISM: ICD-10-CM

## 2024-07-29 LAB
ALBUMIN SERPL BCP-MCNC: 3.6 G/DL (ref 3.5–5.2)
ANION GAP SERPL CALC-SCNC: 5 MMOL/L (ref 8–16)
BUN SERPL-MCNC: 13 MG/DL (ref 6–20)
CALCIUM SERPL-MCNC: 12.2 MG/DL (ref 8.7–10.5)
CHLORIDE SERPL-SCNC: 110 MMOL/L (ref 95–110)
CO2 SERPL-SCNC: 21 MMOL/L (ref 23–29)
CREAT SERPL-MCNC: 1 MG/DL (ref 0.5–1.4)
EST. GFR  (NO RACE VARIABLE): >60 ML/MIN/1.73 M^2
GLUCOSE SERPL-MCNC: 107 MG/DL (ref 70–110)
PHOSPHATE SERPL-MCNC: 2.4 MG/DL (ref 2.7–4.5)
POTASSIUM SERPL-SCNC: 4.1 MMOL/L (ref 3.5–5.1)
PTH-INTACT SERPL-MCNC: 401.8 PG/ML (ref 9–77)
SODIUM SERPL-SCNC: 136 MMOL/L (ref 136–145)

## 2024-07-29 PROCEDURE — 3044F HG A1C LEVEL LT 7.0%: CPT | Mod: CPTII,,, | Performed by: INTERNAL MEDICINE

## 2024-07-29 PROCEDURE — 83970 ASSAY OF PARATHORMONE: CPT | Performed by: INTERNAL MEDICINE

## 2024-07-29 PROCEDURE — 1159F MED LIST DOCD IN RCRD: CPT | Mod: CPTII,,, | Performed by: INTERNAL MEDICINE

## 2024-07-29 PROCEDURE — 4010F ACE/ARB THERAPY RXD/TAKEN: CPT | Mod: CPTII,,, | Performed by: INTERNAL MEDICINE

## 2024-07-29 PROCEDURE — 3078F DIAST BP <80 MM HG: CPT | Mod: CPTII,,, | Performed by: INTERNAL MEDICINE

## 2024-07-29 PROCEDURE — 99214 OFFICE O/P EST MOD 30 MIN: CPT | Mod: PBBFAC,PO | Performed by: INTERNAL MEDICINE

## 2024-07-29 PROCEDURE — 99204 OFFICE O/P NEW MOD 45 MIN: CPT | Mod: S$PBB,,, | Performed by: INTERNAL MEDICINE

## 2024-07-29 PROCEDURE — 3074F SYST BP LT 130 MM HG: CPT | Mod: CPTII,,, | Performed by: INTERNAL MEDICINE

## 2024-07-29 PROCEDURE — 80069 RENAL FUNCTION PANEL: CPT | Performed by: INTERNAL MEDICINE

## 2024-07-29 PROCEDURE — 3008F BODY MASS INDEX DOCD: CPT | Mod: CPTII,,, | Performed by: INTERNAL MEDICINE

## 2024-07-29 PROCEDURE — 36415 COLL VENOUS BLD VENIPUNCTURE: CPT | Mod: PO | Performed by: INTERNAL MEDICINE

## 2024-07-29 PROCEDURE — 1160F RVW MEDS BY RX/DR IN RCRD: CPT | Mod: CPTII,,, | Performed by: INTERNAL MEDICINE

## 2024-07-29 PROCEDURE — 99999 PR PBB SHADOW E&M-EST. PATIENT-LVL IV: CPT | Mod: PBBFAC,,, | Performed by: INTERNAL MEDICINE

## 2024-07-30 ENCOUNTER — PATIENT MESSAGE (OUTPATIENT)
Dept: ENDOCRINOLOGY | Facility: CLINIC | Age: 49
End: 2024-07-30
Payer: MEDICAID

## 2024-07-30 ENCOUNTER — TELEPHONE (OUTPATIENT)
Dept: ENDOCRINOLOGY | Facility: CLINIC | Age: 49
End: 2024-07-30
Payer: MEDICAID

## 2024-07-30 DIAGNOSIS — E21.0 PRIMARY HYPERPARATHYROIDISM: Primary | ICD-10-CM

## 2024-07-30 NOTE — TELEPHONE ENCOUNTER
Pt notified of panic calcium value of 12.3 per lab.  Note several days ago was 12.5.  Pt questioned if she was feeling poorly and she states yes, her stomach hurts and she is tired all the time, and all anyone does is give her pills and sends her out the door.  Pt advised that is why she is being worked up and if anything drastically worsens and specifically any cardiac symptoms to seek emergency care.  Will review with Dr. Joseph.

## 2024-07-30 NOTE — TELEPHONE ENCOUNTER
S/w pt and per Layne,she is working on scheduling pt to do a PTH scan and other labs.Pt verb understanding

## 2024-07-30 NOTE — TELEPHONE ENCOUNTER
----- Message from Dacia Wang sent at 7/30/2024  7:38 AM CDT -----  Type: Needs Medical Advice  Who Called:  pt  Symptoms (please be specific):    How long has patient had these symptoms:    Pharmacy name and phone #:    Best Call Back Number: [phone]    Additional Information: Please call pt regarding bloodwork

## 2024-07-31 ENCOUNTER — LAB VISIT (OUTPATIENT)
Dept: LAB | Facility: HOSPITAL | Age: 49
End: 2024-07-31
Attending: INTERNAL MEDICINE
Payer: MEDICAID

## 2024-07-31 ENCOUNTER — TELEPHONE (OUTPATIENT)
Dept: SURGERY | Facility: CLINIC | Age: 49
End: 2024-07-31
Payer: MEDICAID

## 2024-07-31 DIAGNOSIS — E21.0 PRIMARY HYPERPARATHYROIDISM: ICD-10-CM

## 2024-07-31 LAB
CALCIUM 24H UR-MRATE: 29 MG/HR (ref 4–12)
CALCIUM UR-MCNC: 28.4 MG/DL (ref 0–15)
CALCIUM URINE (MG/SPEC): 696 MG/SPEC
CREAT 24H UR-MRATE: 88.8 MG/HR (ref 40–75)
CREAT UR-MCNC: 87 MG/DL (ref 15–325)
CREATININE, URINE (MG/SPEC): 2131.5 MG/SPEC
URINE COLLECTION DURATION: 24 HR
URINE COLLECTION DURATION: 24 HR
URINE VOLUME: 2450 ML
URINE VOLUME: 2450 ML

## 2024-07-31 PROCEDURE — 82570 ASSAY OF URINE CREATININE: CPT | Performed by: INTERNAL MEDICINE

## 2024-07-31 PROCEDURE — 82340 ASSAY OF CALCIUM IN URINE: CPT | Performed by: INTERNAL MEDICINE

## 2024-07-31 NOTE — TELEPHONE ENCOUNTER
Left message on patient's voicemail to call for an appt with Dr. Quesada as referred by Dr. Joseph for her parathyroidism.  Direct phone number given for her to call for an appt.

## 2024-08-01 ENCOUNTER — TELEPHONE (OUTPATIENT)
Dept: ENDOCRINOLOGY | Facility: CLINIC | Age: 49
End: 2024-08-01
Payer: MEDICAID

## 2024-08-01 RX ORDER — SODIUM CHLORIDE 0.9 % (FLUSH) 0.9 %
10 SYRINGE (ML) INJECTION
OUTPATIENT
Start: 2024-08-01

## 2024-08-01 RX ORDER — HEPARIN 100 UNIT/ML
500 SYRINGE INTRAVENOUS
OUTPATIENT
Start: 2024-08-01

## 2024-08-06 ENCOUNTER — TELEPHONE (OUTPATIENT)
Dept: ENDOCRINOLOGY | Facility: CLINIC | Age: 49
End: 2024-08-06
Payer: MEDICAID

## 2024-08-14 ENCOUNTER — TELEPHONE (OUTPATIENT)
Dept: ENDOCRINOLOGY | Facility: CLINIC | Age: 49
End: 2024-08-14
Payer: MEDICAID

## 2024-08-14 DIAGNOSIS — E21.0 PRIMARY HYPERPARATHYROIDISM: Primary | ICD-10-CM

## 2024-08-14 NOTE — TELEPHONE ENCOUNTER
S/w pt, Dr. Joseph has ordered PTH and renal panel. Pt states she'd rather wait and get them done on 8/20/24 with the other tests scheduled for that day.

## 2024-08-14 NOTE — TELEPHONE ENCOUNTER
Check a Renal Panel and PTH. Can see if levels coming down    Also may want to check with PCP to make sure not another source for pains. Not usual that parathyroid causes these kind of symptoms. Want to make sure not missing something else.     The main things we can do to fix parathyroid is to get calcium down with drugs like sensipar or do surgery for which we are waiting on the surgery consult

## 2024-08-14 NOTE — TELEPHONE ENCOUNTER
----- Message from Nicolette Gomez sent at 8/14/2024 11:16 AM CDT -----  Regarding: call back  Contact: patient  Type: Needs Medical Advice  Who Called:  patient  Symptoms (please be specific):    How long has patient had these symptoms:    Pharmacy name and phone #:    Best Call Back Number: [phone]    Additional Information:  libertad valadez pt is seeking to speak with u are u available MRN: [id number] GALINA DIXON    [phone]

## 2024-08-14 NOTE — TELEPHONE ENCOUNTER
S/w pt to clarify, she wants to continue with the plan from when I s/w her right after lunch. She will get the labs done on 8/20 with the other tests and she will let us know if decides to get them sooner.

## 2024-08-20 ENCOUNTER — TELEPHONE (OUTPATIENT)
Dept: ENDOCRINOLOGY | Facility: CLINIC | Age: 49
End: 2024-08-20
Payer: MEDICAID

## 2024-08-20 ENCOUNTER — HOSPITAL ENCOUNTER (OUTPATIENT)
Dept: RADIOLOGY | Facility: HOSPITAL | Age: 49
Discharge: HOME | End: 2024-08-20
Attending: INTERNAL MEDICINE
Payer: MEDICAID

## 2024-08-20 DIAGNOSIS — E21.0 PRIMARY HYPERPARATHYROIDISM: ICD-10-CM

## 2024-08-20 DIAGNOSIS — E83.52 HYPERCALCEMIA: ICD-10-CM

## 2024-08-20 DIAGNOSIS — E04.1 THYROID NODULE: Primary | ICD-10-CM

## 2024-08-20 PROCEDURE — 76536 US EXAM OF HEAD AND NECK: CPT | Mod: TC,PO

## 2024-08-20 PROCEDURE — A9500 TC99M SESTAMIBI: HCPCS | Mod: PO | Performed by: INTERNAL MEDICINE

## 2024-08-20 PROCEDURE — 78070 PARATHYROID PLANAR IMAGING: CPT | Mod: 26,,, | Performed by: STUDENT IN AN ORGANIZED HEALTH CARE EDUCATION/TRAINING PROGRAM

## 2024-08-20 PROCEDURE — 78070 PARATHYROID PLANAR IMAGING: CPT | Mod: TC,PO

## 2024-08-20 PROCEDURE — 77081 DXA BONE DENSITY APPENDICULR: CPT | Mod: TC,PO

## 2024-08-20 PROCEDURE — 76536 US EXAM OF HEAD AND NECK: CPT | Mod: 26,,, | Performed by: RADIOLOGY

## 2024-08-20 PROCEDURE — 77081 DXA BONE DENSITY APPENDICULR: CPT | Mod: 26,,, | Performed by: RADIOLOGY

## 2024-08-20 RX ORDER — TETRAKIS(2-METHOXYISOBUTYLISOCYANIDE)COPPER(I) TETRAFLUOROBORATE 1 MG/ML
19.5 INJECTION, POWDER, LYOPHILIZED, FOR SOLUTION INTRAVENOUS
Status: COMPLETED | OUTPATIENT
Start: 2024-08-20 | End: 2024-08-20

## 2024-08-20 RX ADMIN — KIT FOR THE PREPARATION OF TECHNETIUM TC99M SESTAMIBI 19.5 MILLICURIE: 1 INJECTION, POWDER, LYOPHILIZED, FOR SOLUTION PARENTERAL at 10:08

## 2024-08-20 NOTE — TELEPHONE ENCOUNTER
Pt advised and verb understanding. Sched for FNA on 9/16 with Dr. Joseph, but I'm reaching out to radiology to see if they can do it sooner.

## 2024-08-20 NOTE — TELEPHONE ENCOUNTER
Will need right thyroid FNA.   Can have radiology do it if wants it done sooner. I know she has issues getting off work, so can try to do it around that. Could also be done at [facility] if needed.

## 2024-08-21 ENCOUNTER — PATIENT MESSAGE (OUTPATIENT)
Dept: ENDOCRINOLOGY | Facility: CLINIC | Age: 49
End: 2024-08-21
Payer: MEDICAID

## 2024-08-21 RX ORDER — CINACALCET 30 MG/1
TABLET, FILM COATED ORAL
Qty: 90 TABLET | Refills: 3 | Status: SHIPPED | OUTPATIENT
Start: 2024-08-21

## 2024-08-21 NOTE — TELEPHONE ENCOUNTER
I spoke topatient this AM.   Can cancel thyroid FNA.   I reviewed Ultrasound images and compared to the parathyroid scan. The right nodule appears to be a parathyroid adenoma. So does not need FNA  Appears the zometa was never approved. We never heard back    Increase sensipar to 60 mg in AM and 30 mg in PM    Check Renal Panel, PTH in 2 weeks

## 2024-08-22 NOTE — ASSESSMENT & PLAN NOTE
Symptomatic primary hyperparathyroidism, meets criteria for parathyroid surgery based on young age, significant hypercalcemia, hypercalciuria, nephrolithiasis, and fracture history.  Localization studies suggest a right inferior parathyroid adenoma.    - OR for parathyroidectomy with intraoperative PTH monitoring  - Preoperative education completed  - Surgical consent was signed and witnessed  - Reviewed need for postoperative calcium supplementation  - Maintain adequate hydration and avoid the use of calcium supplements

## 2024-08-22 NOTE — H&P (VIEW-ONLY)
Endocrine Surgery History & Physical     REFERRING PROVIDER: Vaibhav Joseph DO    REASON FOR VISIT: Hyperparathyroidism    HPI: Erika Jenkins is a 49 y.o. female patient with a history notable for anxiety, diverticulosis, GERD, HLD, nephrolithiasis, obesity, and HTN who presents in consultation for primary hyperparathyroidism.  Suspicion for hyperparathyroidism was raised 7/23/24 on routine laboratory testing when she was noted to have elevated calcium levels.      Details of the workup are as follows:     Recent laboratory studies:  Hypercalcemia noted since 7/23/24  Max calcium elevation to 12.5 mg/dL  Parathyroid hormone levels elevated with hypercalcemia  Most recent PTH level was 516 with a corresponding calcium of 12.1, corrects to 12.5 for an albumin of 3.6  Phosphorus: 2.0  Vitamin D: none recently  24 hour urine calcium: 696 mg/24 hours, Benign Familial Hypocalciuric Hypercalcemia ruled out    Medications:  Vitamin D supplementation: none  Lithium, thiazide diuretics: none  Calcium supplements or calcimimetic: on cinacalcet    Symptoms:   The patient reports the following symptoms: [x]musculoskeletal pain/body aches, [x]fractures, [x]nephrolithiasis, [x]GERD, []peptic ulcer disease, []abdominal pain, [x]polydipsia, []polyuria, []pruritis  The patient reports the following neurocognitive symptoms: [x]brain fog, [x]concentration difficulties, []fatigue, []forgetfulness, []mood/psychiatric disturbances  The patient denies dysphagia, globus sensation, compression symptoms, or anterior neck pain.  The patient denies hoarseness, voice changes or increased need to clear the throat.  Bone mineral density: []Osteoporosis []Osteopenia [x]Normal bone density.  Last DEXA 8/20/24 with T-score -0.9 of distal left radius    Surgical risk factors:  Risk of concurrent thyroid disease low  Ultrasound of the thyroid 8/20/24  History of neck radiation: none  Prior neck surgery: none  Cardiovascular risk: able to tolerate > 4  METs  Antiplatelet therapy and anticoagulation: no    Family history:  No family history of endocrinopathies or endocrine cancers including pituitary tumors, pancreatic neuroendocrine tumors, medullary thyroid cancer or pheochromocytoma/paraganglioma.     Localization studies done prior to this visit:  Ultrasound:  8/20/24 - Right  Nuclear Medicine Parathyroid Scan: 8/20/24 - Right  4D-CT Parathyroid scan: none    LABORATORY STUDIES:  I personally and independently reviewed relevant lab test results, including the following:      Component      Latest Ref Rng 7/31/2024   Urine Total Volume      mL 2450    Urine Total Volume      mL 2450    Urine Collection Duration      Hr 24    Urine Collection Duration      Hr 24    Calcium, Urine      0.0 - 15.0 mg/dL 28.4 (H)    Calcium, Timed Urine      4 - 12 mg/Hr 29 (H)    Urine Calcium Absolute       mg/Spec 696    CREATININE, URINE (SEND OUT)      15.0 - 325.0 mg/dL 87.0    Creatinine, Timed Urine      40.0 - 75.0 mg/Hr 88.8 (H)    Urine Creatinine Absolute      mg/Spec 2131.5      PAST MEDICAL HISTORY:  Patient Active Problem List   Diagnosis    Morbid obesity with BMI of 45.0-49.9, adult    Dyslipidemia    Gastroesophageal reflux disease without esophagitis    Irritable bowel syndrome with both constipation and diarrhea    Prediabetes    Primary hypertension    Mild intermittent asthma without complication    Wellness examination    Inverse psoriasis    Primary hyperparathyroidism    Hypercalcemia         PAST SURGICAL HISTORY:  Past Surgical History:   Procedure Laterality Date    CHOLECYSTECTOMY      COLONOSCOPY W/ POLYPECTOMY  06/17/2024    Dr Jase Johnston    ELBOW SURGERY Left 12/16/2013    TUBAL LIGATION          MEDICATIONS:  Current Outpatient Medications   Medication Sig Dispense Refill    albuterol (PROVENTIL/VENTOLIN HFA) 90 mcg/actuation inhaler Inhale 2 puffs into the lungs 2 (two) times daily as needed for Wheezing or Shortness of Breath. 18 g 1     "cinacalcet (SENSIPAR) 30 MG Tab 60 mg in AM and 30 mg in PM 90 tablet 3    dicyclomine (BENTYL) 10 MG capsule Take 30 capsules by mouth 3 (three) times daily.      ibuprofen (ADVIL,MOTRIN) 800 MG tablet Take 1 tablet (800 mg total) by mouth 3 (three) times daily. 30 tablet 1    LINZESS 145 mcg Cap capsule 90 capsules.      lisinopriL 10 MG tablet Take 1 tablet (10 mg total) by mouth once daily. 90 tablet 1    montelukast (SINGULAIR) 10 mg tablet Take 1 tablet (10 mg total) by mouth once daily. 90 tablet 1    ondansetron (ZOFRAN-ODT) 4 MG TbDL 12 tablets.      pantoprazole (PROTONIX) 40 MG tablet Take 40 mg by mouth 2 (two) times daily.      potassium,sodium monobas phos (K-PHOS NO 2) 305-700 mg Tab Take 1 tablet by mouth once daily. for 5 days 5 each 0     No current facility-administered medications for this visit.       ALLERGIES:  Review of patient's allergies indicates:   Allergen Reactions    Codeine     Penicillins Rash       SOCIAL HISTORY:  Social History     Tobacco Use    Smoking status: Never    Smokeless tobacco: Never   Substance Use Topics    Alcohol use: Not Currently    Drug use: Not Currently         FAMILY HISTORY:  Family History   Problem Relation Name Age of Onset    Hyperlipidemia Mother      Heart disease Mother      Diabetes Mother      Arthritis Mother      Cancer Mother          Liver    Cancer Sister      Uterine cancer Sister      Cancer Maternal Grandmother      Uterine cancer Maternal Grandmother      Uterine cancer Other Great Mat. Grandmother         REVIEW OF SYSTEMS:  A detailed review of systems has been reviewed with the patient, pertinent positives and negatives are presented in the note and is otherwise negative.    PHYSICAL EXAMINATION:  Vital Signs: BP (!) 130/90   Ht 5' 7" (1.702 m)   Wt 136 kg (299 lb 15 oz)   BMI 46.98 kg/m²     Constitutional: well-developed, well-nourished, no acute distress   HENT: no lid lag, no exophthalmos, no scleral icterus, moist mucous " membranes  Neck: supple, trachea in midline, thyroid is soft and moves well with swallowing, no palpable nodularity, adequate neck extension  Heme/Lymph: no cervical or supraclavicular lymphadenopathy  Respiratory: normal respiratory effort, no wheezes or stridor  Cardiovascular: regular rate and rhythm  Extremities: no edema  Skin: warm and dry, no rashes  Neurologic: no gross resting tremor of outstretched hands, voice strong  Vascular: radial pulses palpable bilaterally  Psychiatric: affect normal    IMAGING STUDIES:  I personally and independently reviewed, visualized and interpreted the images of the below listed radiology studies (including the ultrasound and NM scans) and my findings are notable for a hypoechoic structure deep to the right inferior lobe of the thyroid with classic appearance of a parathyroid adenoma corresponding to the area of increased uptake on NM scan that also shows a likely adenoma in the right inferior position.  Reports below for reference.    US Thyroid 08/20/2024  CLINICAL HISTORY:Primary hyperparathyroidism  TECHNIQUE:Ultrasound of the thyroid  COMPARISON:None.    FINDINGS:  The right lobe of thyroid gland measures 4.8 x 2.0 x 1.1 cm in size.  The left lobe the thyroid gland measures 4.1 x 1.6 x 1.4cm in size.  This gives an approximate volume of on the right of 6cc and an approximate volume on the left of 5 cc for a total approximate volume of 11 cc.    A 9 mm isoechoic echogenicity is noted in the right upper lobe of the thyroid gland wider than tall without obvious microcalcifications poorly defined.    A small nodule is noted at the lower left lobe of the thyroid gland that is solid isoechoic wider than tall of 13 mm with no microcalcifications.  This can be sitter to a TR 3 or TR 4 nodule and follow-up for size stability is suggested    A hypoechoic well-circumscribed solid nodule is noted along the posterior border of the right lobe of the thyroid gland that is very  hypoechoic of 2.0 x 2.0 x 1.8 cm without obvious microcalcifications wider than tall.  This meets criteria for fine-needle aspiration or biopsy.  This is a TR 4 nodule meeting size criteria for fine-needle aspiration or biopsy    Impression  1. Hypoechoic solid thyroid nodule deep to the right lobe of the thyroid gland of 2.0 cm meeting size criteria for fine-needle aspiration or biopsy.  This could be thyroid, parathyroid, or relate to an adjacent lymph node    Electronically signed by: Yaron Erazo MD  Date:    08/20/2024  Time:    11:50      NM PARATHYROID SCAN PLANAR   CLINICAL HISTORY:Primary hyperparathyroidism   TECHNIQUE:Following injection of 19.5 mCi Tc99m sestamibi and approximately 10 minute delay, anterior projection images of the neck and chest were obtained. After a two-hour delay, repeat anterior projection images of the neck were acquired.   COMPARISON:Thyroid ultrasound 08/20/2024     FINDINGS:  There is physiological tracer uptake in the myocardium, salivary glands, and thyroid gland. Delayed images demonstrate near-complete tracer washout from the thyroid.     Focal persistent uptake on delayed images in the region of the superior/mid right thyroid lobe consistent with a parathyroid adenoma.     Impression:   Focal persistent uptake in the region of the superior/mid right thyroid lobe consistent with a parathyroid adenoma.    DXA Bone Density Appendicular Skeleton 08/20/2024  CLINICAL HISTORY:Distal Radius only; Primary hyperparathyroidism  TECHNIQUE:DXA scanning was performed over the left forearm.  Review of the images confirms satisfactory positioning and technique.  COMPARISON:None    FINDINGS:  The 1/3 left forearm bone mineral density is equal to 0.637 g/cm squared with a T score of -0.9.  No prior studies for comparison    Impression  Normal bone mineral density    Electronically signed by: Nelida Guerrero MD  Date:    08/20/2024  Time:    12:15      DXA Bone Density Axial Skeleton 1 or  more sites 07/29/2024  CLINICAL HISTORY:Primary hyperparathyroidism  TECHNIQUE:DXA scanning was performed over the bilateral hip and lumbar spine.  Review of the images confirms satisfactory positioning and technique.  COMPARISON:None    FINDINGS:  The L1 to L4 vertebral bone mineral density is equal to 1.146 g/cm squared with a T score of -0.4.    The left femoral neck bone mineral density is equal to 0.943 g/cm squared with a T score of -0.7.  The right femoral neck bone mineral density is equal to 0.938 g/cm squared with a T score of -0.7.    There is a 4.9% risk of a major osteoporotic fracture and a 0.2% risk of hip fracture in the next 10 years (FRAX).    Impression  The measured bone mineral density in the lumbar spine and bilateral femoral necks corresponds with the normal category according to world health organization criteria.    Electronically signed by: Amol Lui MD  Date:    07/29/2024  Time:    09:51    IMPRESSION:  I had the pleasure of seeing Ms. Jenkins in Endocrine Surgical consultation regarding the biochemical diagnosis of primary hyperparathyroidism.     We discussed that hyperparathyroidism can compromise bone and cardiovascular health. In addition to classic symptoms such as kidney stones and bone loss, hyperparathyroidism can be associated with multiple symptoms including fatigue, depression, memory loss, pruritis, polyuria, nocturia, constipation, polydipsia, and musculoskeletal aches and pains. Hyperparathyroidism can also worsen hypertension.  I discussed the implications of non-operative observation as well as the standard of care surgical treatment of primary hyperparathyroidism.  Based on the current clinical findings and a thorough discussion about the risks, benefits, and alternatives, the patient elected to proceed with parathyroidectomy.      We extensively discussed the pros and cons for surgical intervention, in this case parathyroidectomy with intraoperative parathyroid hormone  monitoring.  We discussed that in the majority of cases, a single adenoma is the culprit gland. However, multigland disease is possible and multigland disease has a lower likelihood of radiographic localization.  Parathyroidectomy for single gland disease is a same day surgery while four-gland parathyroid exploration may require an overnight stay. We discussed that in all cases, parathyroidectomy has an attendant risk of postoperative hypocalcemia which can be mitigated with calcium and vitamin D supplementation. Other possible complications associated with this may include, but may not be restricted to hoarseness, recurrent laryngeal nerve injury - temporary or permanent, superior laryngeal nerve injury - temporary or permanent, hypocalcemia and hypoparathyroidism - temporary or permanent, neck hematoma, bleeding, infection, scarring, wound healing problems and possible death. We discussed that in rare cases, parathyroid exploration may be negative. Recurrent and persistent disease are also possible.      All questions were answered and the patient expressed understanding of all the risks, benefits and alternatives, and agreed to proceed with the plan despite the risks.      Problem List Items Addressed This Visit       Primary hyperparathyroidism - Primary     Symptomatic primary hyperparathyroidism, meets criteria for parathyroid surgery based on young age, significant hypercalcemia, hypercalciuria, nephrolithiasis, and fracture history.  Localization studies suggest a right inferior parathyroid adenoma.    - OR for parathyroidectomy with intraoperative PTH monitoring  - Preoperative education completed  - Surgical consent was signed and witnessed  - Reviewed need for postoperative calcium supplementation  - Maintain adequate hydration and avoid the use of calcium supplements         Relevant Orders    Case Request Operating Room: PARATHYROIDECTOMY (Completed)      Zonia Quesada MD  Staff Surgeon  Endocrine  Surgery  8/26/24

## 2024-08-26 ENCOUNTER — OFFICE VISIT (OUTPATIENT)
Dept: SURGERY | Facility: CLINIC | Age: 49
End: 2024-08-26
Payer: MEDICAID

## 2024-08-26 VITALS
DIASTOLIC BLOOD PRESSURE: 90 MMHG | BODY MASS INDEX: 45.99 KG/M2 | HEIGHT: 67 IN | SYSTOLIC BLOOD PRESSURE: 130 MMHG | WEIGHT: 293 LBS

## 2024-08-26 DIAGNOSIS — E21.0 PRIMARY HYPERPARATHYROIDISM: Primary | ICD-10-CM

## 2024-08-26 PROCEDURE — 3044F HG A1C LEVEL LT 7.0%: CPT | Mod: CPTII,,, | Performed by: STUDENT IN AN ORGANIZED HEALTH CARE EDUCATION/TRAINING PROGRAM

## 2024-08-26 PROCEDURE — 3008F BODY MASS INDEX DOCD: CPT | Mod: CPTII,,, | Performed by: STUDENT IN AN ORGANIZED HEALTH CARE EDUCATION/TRAINING PROGRAM

## 2024-08-26 PROCEDURE — 99205 OFFICE O/P NEW HI 60 MIN: CPT | Mod: S$PBB,,, | Performed by: STUDENT IN AN ORGANIZED HEALTH CARE EDUCATION/TRAINING PROGRAM

## 2024-08-26 PROCEDURE — 99214 OFFICE O/P EST MOD 30 MIN: CPT | Mod: PBBFAC | Performed by: STUDENT IN AN ORGANIZED HEALTH CARE EDUCATION/TRAINING PROGRAM

## 2024-08-26 PROCEDURE — 4010F ACE/ARB THERAPY RXD/TAKEN: CPT | Mod: CPTII,,, | Performed by: STUDENT IN AN ORGANIZED HEALTH CARE EDUCATION/TRAINING PROGRAM

## 2024-08-26 PROCEDURE — 3080F DIAST BP >= 90 MM HG: CPT | Mod: CPTII,,, | Performed by: STUDENT IN AN ORGANIZED HEALTH CARE EDUCATION/TRAINING PROGRAM

## 2024-08-26 PROCEDURE — 1159F MED LIST DOCD IN RCRD: CPT | Mod: CPTII,,, | Performed by: STUDENT IN AN ORGANIZED HEALTH CARE EDUCATION/TRAINING PROGRAM

## 2024-08-26 PROCEDURE — 99999 PR PBB SHADOW E&M-EST. PATIENT-LVL IV: CPT | Mod: PBBFAC,,, | Performed by: STUDENT IN AN ORGANIZED HEALTH CARE EDUCATION/TRAINING PROGRAM

## 2024-08-26 PROCEDURE — 3075F SYST BP GE 130 - 139MM HG: CPT | Mod: CPTII,,, | Performed by: STUDENT IN AN ORGANIZED HEALTH CARE EDUCATION/TRAINING PROGRAM

## 2024-08-27 NOTE — TELEPHONE ENCOUNTER
Refill Routing Note   Medication(s) are not appropriate for processing by [facility] for the following reason(s):        Outside of protocol    [facility] action(s):  Route               Appointments  past 12m or future 3m with PCP    Date Provider   Last Visit   7/2/2024 Dewayne Salas MD   Next Visit   Visit date not found Dewayne Salas MD   ED visits in past 90 days: 1        Note composed:9:49 AM 08/27/2024

## 2024-08-30 RX ORDER — IBUPROFEN 800 MG/1
800 TABLET ORAL 3 TIMES DAILY
Qty: 30 TABLET | Refills: 1 | Status: SHIPPED | OUTPATIENT
Start: 2024-08-30

## 2024-09-04 ENCOUNTER — PATIENT MESSAGE (OUTPATIENT)
Dept: INFUSION THERAPY | Facility: HOSPITAL | Age: 49
End: 2024-09-04
Payer: MEDICAID

## 2024-09-09 ENCOUNTER — INFUSION (OUTPATIENT)
Dept: INFUSION THERAPY | Facility: HOSPITAL | Age: 49
End: 2024-09-09
Attending: INTERNAL MEDICINE
Payer: MEDICAID

## 2024-09-09 ENCOUNTER — PATIENT MESSAGE (OUTPATIENT)
Dept: INFUSION THERAPY | Facility: HOSPITAL | Age: 49
End: 2024-09-09

## 2024-09-09 ENCOUNTER — LAB VISIT (OUTPATIENT)
Dept: LAB | Facility: HOSPITAL | Age: 49
End: 2024-09-09
Attending: INTERNAL MEDICINE
Payer: MEDICAID

## 2024-09-09 VITALS
HEIGHT: 67 IN | BODY MASS INDEX: 45.99 KG/M2 | RESPIRATION RATE: 18 BRPM | DIASTOLIC BLOOD PRESSURE: 71 MMHG | WEIGHT: 293 LBS | OXYGEN SATURATION: 99 % | HEART RATE: 64 BPM | TEMPERATURE: 98 F | SYSTOLIC BLOOD PRESSURE: 119 MMHG

## 2024-09-09 DIAGNOSIS — E83.52 HYPERCALCEMIA: Primary | ICD-10-CM

## 2024-09-09 DIAGNOSIS — E21.0 PRIMARY HYPERPARATHYROIDISM: ICD-10-CM

## 2024-09-09 LAB
ALBUMIN SERPL BCP-MCNC: 3.6 G/DL (ref 3.5–5.2)
ANION GAP SERPL CALC-SCNC: 7 MMOL/L (ref 8–16)
BUN SERPL-MCNC: 14 MG/DL (ref 6–20)
CALCIUM SERPL-MCNC: 11.9 MG/DL (ref 8.7–10.5)
CHLORIDE SERPL-SCNC: 112 MMOL/L (ref 95–110)
CO2 SERPL-SCNC: 21 MMOL/L (ref 23–29)
CREAT SERPL-MCNC: 1 MG/DL (ref 0.5–1.4)
EST. GFR  (NO RACE VARIABLE): >60 ML/MIN/1.73 M^2
GLUCOSE SERPL-MCNC: 101 MG/DL (ref 70–110)
PHOSPHATE SERPL-MCNC: 1.8 MG/DL (ref 2.7–4.5)
POTASSIUM SERPL-SCNC: 4.4 MMOL/L (ref 3.5–5.1)
PTH-INTACT SERPL-MCNC: 508.1 PG/ML (ref 9–77)
SODIUM SERPL-SCNC: 140 MMOL/L (ref 136–145)

## 2024-09-09 PROCEDURE — 96365 THER/PROPH/DIAG IV INF INIT: CPT | Mod: PN

## 2024-09-09 PROCEDURE — 83970 ASSAY OF PARATHORMONE: CPT | Performed by: INTERNAL MEDICINE

## 2024-09-09 PROCEDURE — 63600175 PHARM REV CODE 636 W HCPCS: Mod: PN | Performed by: INTERNAL MEDICINE

## 2024-09-09 PROCEDURE — A4216 STERILE WATER/SALINE, 10 ML: HCPCS | Mod: PN | Performed by: INTERNAL MEDICINE

## 2024-09-09 PROCEDURE — 25000003 PHARM REV CODE 250: Mod: PN | Performed by: INTERNAL MEDICINE

## 2024-09-09 PROCEDURE — 36415 COLL VENOUS BLD VENIPUNCTURE: CPT | Mod: PN | Performed by: INTERNAL MEDICINE

## 2024-09-09 PROCEDURE — 80069 RENAL FUNCTION PANEL: CPT | Mod: PN | Performed by: INTERNAL MEDICINE

## 2024-09-09 RX ORDER — SODIUM CHLORIDE 0.9 % (FLUSH) 0.9 %
10 SYRINGE (ML) INJECTION
Status: DISCONTINUED | OUTPATIENT
Start: 2024-09-09 | End: 2024-09-09 | Stop reason: HOSPADM

## 2024-09-09 RX ORDER — ZOLEDRONIC ACID 0.04 MG/ML
4 INJECTION, SOLUTION INTRAVENOUS
OUTPATIENT
Start: 2024-10-07

## 2024-09-09 RX ORDER — HEPARIN 100 UNIT/ML
500 SYRINGE INTRAVENOUS
OUTPATIENT
Start: 2024-10-07

## 2024-09-09 RX ORDER — HEPARIN 100 UNIT/ML
500 SYRINGE INTRAVENOUS
Status: DISCONTINUED | OUTPATIENT
Start: 2024-09-09 | End: 2024-09-09 | Stop reason: HOSPADM

## 2024-09-09 RX ORDER — ZOLEDRONIC ACID 0.04 MG/ML
4 INJECTION, SOLUTION INTRAVENOUS
Status: COMPLETED | OUTPATIENT
Start: 2024-09-09 | End: 2024-09-09

## 2024-09-09 RX ORDER — SODIUM CHLORIDE 0.9 % (FLUSH) 0.9 %
10 SYRINGE (ML) INJECTION
OUTPATIENT
Start: 2024-10-07

## 2024-09-09 RX ADMIN — SODIUM CHLORIDE: 9 INJECTION, SOLUTION INTRAVENOUS at 04:09

## 2024-09-09 RX ADMIN — SODIUM CHLORIDE, PRESERVATIVE FREE 10 ML: 5 INJECTION INTRAVENOUS at 04:09

## 2024-09-09 RX ADMIN — ZOLEDRONIC ACID 4 MG: 0.04 INJECTION, SOLUTION INTRAVENOUS at 04:09

## 2024-09-09 NOTE — DISCHARGE INSTRUCTIONS
Things to report to your provider:  Diarrhea (4 or more watery stools in a 24 hour period)  Fever greater than 100.4 degrees  Sores in your mouth  Shortness of breath  Vomiting     If you need us before your next appointment, you can reach the [facility] at [phone]. Thank you for choosing us for your care!

## 2024-09-09 NOTE — PLAN OF CARE
Problem: Adult Inpatient Plan of Care  Goal: Plan of Care Review  Outcome: Progressing  Flowsheets (Taken 9/9/2024 1658)  Plan of Care Reviewed With:   patient   spouse     Patient tolerated Zometa without incident. IV flushed with blood return, saline locked, and removed. AVS provided per portal;schedule reviewed. Ambulates per self; accompanied by significant other upon discharge.   No acute distress noted.      Problem: Adult Inpatient Plan of Care  Goal: Patient-Specific Goal (Individualized)  Outcome: Progressing  Flowsheets (Taken 9/9/2024 1610)  Individualized Care Needs:   recliner, blanket, pillow   offered refreshments   reviewed medication  Anxieties, Fears or Concerns: ready to have surgery  Patient/Family-Specific Goals (Include Timeframe): no s/s reaction during infusion     Problem: Fatigue  Goal: Improved Activity Tolerance  Intervention: Promote Improved Energy  Flowsheets (Taken 9/9/2024 1610)  Sleep/Rest Enhancement:   natural light exposure provided   noise level reduced   room darkened  Activity Management:   Ambulated -L4   Ambulated in cuenca - L4     Patient here today for Zometa. VSS. IV placed to left forearm without difficulty; patent with blood return.   Labs reviewed. Orders released. Pt oriented to unit.   Symptoms reviewed. Questions and concerns addressed.     Zometa x1 per Dr Joseph. Cancelled next appointment for zometa. Patient to have parathyroid surgery on 9/24/24.

## 2024-09-10 ENCOUNTER — PATIENT MESSAGE (OUTPATIENT)
Dept: ENDOCRINOLOGY | Facility: CLINIC | Age: 49
End: 2024-09-10
Payer: MEDICAID

## 2024-09-16 ENCOUNTER — TELEPHONE (OUTPATIENT)
Dept: ENDOCRINOLOGY | Facility: CLINIC | Age: 49
End: 2024-09-16
Payer: MEDICAID

## 2024-09-16 DIAGNOSIS — E83.52 HYPERCALCEMIA: Primary | ICD-10-CM

## 2024-09-17 ENCOUNTER — PATIENT MESSAGE (OUTPATIENT)
Dept: ENDOCRINOLOGY | Facility: CLINIC | Age: 49
End: 2024-09-17
Payer: MEDICAID

## 2024-09-17 NOTE — TELEPHONE ENCOUNTER
S/w pt, advised Dr. Joseph would like renal function panel done this week to check Ca since she had zometa infusion on 9/9. Pt states she will go to [facility] this week to do that.

## 2024-09-17 NOTE — TELEPHONE ENCOUNTER
See if she can do another Renal Panel  this week. Since received zometa may need to reduce sensipar  
YAHAIRAM for pt to call the office to sched lab appt. See Dr. Joseph's msg below.   
no difficulties

## 2024-09-19 NOTE — TELEPHONE ENCOUNTER
I was able to review labs done by the surgeon. They are looking much better. Does not need to repeat  Will stop sensipar once has surgery

## 2024-09-19 NOTE — TELEPHONE ENCOUNTER
I spoke with the patient. Reviewed labs.   Ca improved. Scheduled for surgery next week  She had questions about surgery scheduling and she will contact surgery office

## 2024-09-23 ENCOUNTER — ANESTHESIA EVENT (OUTPATIENT)
Dept: SURGERY | Facility: HOSPITAL | Age: 49
End: 2024-09-23
Payer: MEDICAID

## 2024-09-23 ENCOUNTER — TELEPHONE (OUTPATIENT)
Dept: SURGERY | Facility: CLINIC | Age: 49
End: 2024-09-23
Payer: MEDICAID

## 2024-09-23 DIAGNOSIS — Z98.890 S/P PARATHYROIDECTOMY: ICD-10-CM

## 2024-09-23 DIAGNOSIS — E21.0 PRIMARY HYPERPARATHYROIDISM: Primary | ICD-10-CM

## 2024-09-23 DIAGNOSIS — Z90.89 S/P PARATHYROIDECTOMY: ICD-10-CM

## 2024-09-23 NOTE — ANESTHESIA PREPROCEDURE EVALUATION
[facility]  Anesthesia Pre-Operative Evaluation    Patient Name: Erika Jenkins  YOB: 1975  MRN: [id number]    SUBJECTIVE:   09/23/2024    Pre-operative evaluation for Procedure(s) (LRB):  PARATHYROIDECTOMY (N/A)    Erika Jenkins is a 49 y.o. female with a PMHx significant for HTN, asthma, GERD, BMI 47, and hyperparathyroidism. Patient now presents for the above procedure(s).    Previous Airway: None documented.    LDA: None documented.     Transthoracic Echo:  No results found for this or any previous visit.    Patient Active Problem List   Diagnosis    Morbid obesity with BMI of 45.0-49.9, adult    Dyslipidemia    Gastroesophageal reflux disease without esophagitis    Irritable bowel syndrome with both constipation and diarrhea    Prediabetes    Primary hypertension    Mild intermittent asthma without complication    Wellness examination    Inverse psoriasis    Primary hyperparathyroidism    Hypercalcemia     Review of patient's allergies indicates:   Allergen Reactions    Codeine     Penicillins Rash     Current Outpatient Medications   Medication Instructions    albuterol (PROVENTIL/VENTOLIN HFA) 90 mcg/actuation inhaler 2 puffs, Inhalation, 2 times daily PRN    cinacalcet (SENSIPAR) 30 MG Tab 60 mg in AM and 30 mg in PM    dicyclomine (BENTYL) 10 MG capsule 30 capsules, 3 times daily    ibuprofen (ADVIL,MOTRIN) 800 mg, Oral, 3 times daily    LINZESS 145 mcg Cap capsule 90 capsules    lisinopriL 10 mg, Oral, Daily    montelukast (SINGULAIR) 10 mg, Oral, Daily    ondansetron (ZOFRAN-ODT) 4 MG TbDL 12 tablets    pantoprazole (PROTONIX) 40 mg, 2 times daily    potassium,sodium monobas phos (K-PHOS NO 2) 305-700 mg Tab 1 tablet, Oral, Daily     Past Surgical History:   Procedure Laterality Date    CHOLECYSTECTOMY      COLONOSCOPY W/ POLYPECTOMY  06/17/2024    Dr Jase Johnston    ELBOW SURGERY Left 12/16/2013    TUBAL LIGATION       Social History     Substance and Sexual Activity  "  Drug Use Not Currently     Alcohol Use: Not At Risk (7/28/2024)    AUDIT-C     Frequency of Alcohol Consumption: Never     Average Number of Drinks: Patient does not drink     Frequency of Binge Drinking: Never     Tobacco Use: Low Risk  (8/26/2024)    Patient History     Smoking Tobacco Use: Never     Smokeless Tobacco Use: Never     Passive Exposure: Not on file   Recent Concern: Tobacco Use - Medium Risk (6/17/2024)    Received from [facility]    Patient History     Smoking Tobacco Use: Former     Smokeless Tobacco Use: Never     Passive Exposure: Not on file     OBJECTIVE:     Vital Signs Range:      7/29/2024     3:00 PM 8/26/2024    10:02 AM 9/9/2024     4:08 PM   Vitals - 1 value per visit   SYSTOLIC 118 130 131   DIASTOLIC 62 90 75   Pulse 83  69   Temp   36.6 °C (97.9 °F)   Resp   18   SPO2 97 %  99 %   Weight (lb) 304.79 299.94 301.15   Weight (kg) 138.25 136.05 136.6   Height 5' 7" (1.702 m) 5' 7" (1.702 m) 5' 7" (1.702 m)   BMI (Calculated) 47.7 47 47.2   Pain Score  Zero Five       CBC:   Lab Results   Component Value Date    WBC 6.61 07/23/2024    HGB 14.7 07/23/2024    HCT 42.0 07/23/2024    MCV 93 07/23/2024     07/23/2024     CMP:   Sodium   Date Value Ref Range Status   09/18/2024 138 136 - 145 mmol/L Final     Potassium   Date Value Ref Range Status   09/18/2024 4.5 3.5 - 5.1 mmol/L Final     Comment:     Anion Gap reference range revised on 4/28/2023     Chloride   Date Value Ref Range Status   09/18/2024 111 (H) 95 - 110 mmol/L Final     CO2   Date Value Ref Range Status   09/18/2024 19 (L) 22 - 31 mmol/L Final     Glucose   Date Value Ref Range Status   09/18/2024 107 70 - 110 mg/dL Final     Comment:     The ADA recommends the following guidelines for fasting glucose:    Normal:       less than 100 mg/dL    Prediabetes:  100 mg/dL to 125 mg/dL    Diabetes:     126 mg/dL or higher       BUN   Date Value Ref Range Status   09/18/2024 11 7 - 18 mg/dL Final     Creatinine   Date Value " "Ref Range Status   09/18/2024 0.81 0.50 - 1.40 mg/dL Final     Calcium   Date Value Ref Range Status   09/18/2024 10.4 (H) 8.4 - 10.2 mg/dL Final     Total Protein   Date Value Ref Range Status   07/23/2024 7.3 6.0 - 8.4 g/dL Final     Albumin   Date Value Ref Range Status   09/18/2024 4.2 3.5 - 5.2 g/dL Final     Total Bilirubin   Date Value Ref Range Status   07/23/2024 1.0 0.2 - 1.3 mg/dL Final     Alkaline Phosphatase   Date Value Ref Range Status   07/23/2024 178 (H) 38 - 145 U/L Final     AST   Date Value Ref Range Status   07/23/2024 25 14 - 36 U/L Final     ALT   Date Value Ref Range Status   07/23/2024 30 0 - 35 U/L Final     Anion Gap   Date Value Ref Range Status   09/18/2024 8 5 - 12 mmol/L Final     Comment:     Anion Gap reference range revised on 4/28/2023       INR:  No results found for: "INR", "PROTIME"    Cardiac Studies    EKG:   No results found for this or any previous visit.    ASSESSMENT/PLAN:                                                                                                                09/23/2024  Erika Jenkins is a 49 y.o., female.      Pre-op Assessment    I have reviewed the Patient Summary Reports.     I have reviewed the Nursing Notes. I have reviewed the NPO Status.   I have reviewed the Medications.     Review of Systems  Cardiovascular:     Hypertension                                        Pulmonary:    Asthma                    Renal/:   renal calculi               Hepatic/GI:     GERD             Endocrine:        Morbid Obesity / BMI > 40      Physical Exam  General: Well nourished, Cooperative and Alert    Airway:  Mallampati: I   Mouth Opening: Normal  TM Distance: Normal  Tongue: Normal  Neck ROM: Normal ROM  Neck: Girth Increased    Dental:  Partial Dentures        Anesthesia Plan  Type of Anesthesia, risks & benefits discussed:    Anesthesia Type: Gen ETT  Intra-op Monitoring Plan: Standard ASA Monitors and Art Line  Post Op Pain Control Plan: multimodal " analgesia and IV/PO Opioids PRN  Induction:  IV  Airway Plan: Video  Informed Consent: Informed consent signed with the Patient and all parties understand the risks and agree with anesthesia plan.  All questions answered.   ASA Score: 3  Day of Surgery Review of History & Physical: H&P Update referred to the surgeon/provider.  Anesthesia Plan Notes: MI asthma, last used rescue inhaler weeks ago.     Ready For Surgery From Anesthesia Perspective.     .

## 2024-09-24 ENCOUNTER — ANESTHESIA (OUTPATIENT)
Dept: SURGERY | Facility: HOSPITAL | Age: 49
End: 2024-09-24
Payer: MEDICAID

## 2024-09-24 ENCOUNTER — HOSPITAL ENCOUNTER (INPATIENT)
Facility: HOSPITAL | Age: 49
LOS: 3 days | Discharge: HOME | DRG: 625 | End: 2024-09-27
Attending: STUDENT IN AN ORGANIZED HEALTH CARE EDUCATION/TRAINING PROGRAM | Admitting: STUDENT IN AN ORGANIZED HEALTH CARE EDUCATION/TRAINING PROGRAM
Payer: MEDICAID

## 2024-09-24 DIAGNOSIS — C75.0 PARATHYROID CARCINOMA: Primary | ICD-10-CM

## 2024-09-24 DIAGNOSIS — E21.3 HYPERPARATHYROIDISM: ICD-10-CM

## 2024-09-24 DIAGNOSIS — E21.0 PRIMARY HYPERPARATHYROIDISM: ICD-10-CM

## 2024-09-24 DIAGNOSIS — E83.52 HYPERCALCEMIA: ICD-10-CM

## 2024-09-24 LAB
ALBUMIN SERPL BCP-MCNC: 3.4 G/DL (ref 3.5–5.2)
ANION GAP SERPL CALC-SCNC: 7 MMOL/L (ref 8–16)
ANION GAP SERPL CALC-SCNC: 9 MMOL/L (ref 8–16)
B-HCG UR QL: NEGATIVE
BUN SERPL-MCNC: 10 MG/DL (ref 6–20)
BUN SERPL-MCNC: 8 MG/DL (ref 6–20)
CALCIUM SERPL-MCNC: 8.9 MG/DL (ref 8.7–10.5)
CALCIUM SERPL-MCNC: 9.2 MG/DL (ref 8.7–10.5)
CHLORIDE SERPL-SCNC: 111 MMOL/L (ref 95–110)
CHLORIDE SERPL-SCNC: 113 MMOL/L (ref 95–110)
CO2 SERPL-SCNC: 17 MMOL/L (ref 23–29)
CO2 SERPL-SCNC: 18 MMOL/L (ref 23–29)
CREAT SERPL-MCNC: 0.9 MG/DL (ref 0.5–1.4)
CREAT SERPL-MCNC: 0.9 MG/DL (ref 0.5–1.4)
CTP QC/QA: YES
EST. GFR  (NO RACE VARIABLE): >60 ML/MIN/1.73 M^2
EST. GFR  (NO RACE VARIABLE): >60 ML/MIN/1.73 M^2
GLUCOSE SERPL-MCNC: 146 MG/DL (ref 70–110)
GLUCOSE SERPL-MCNC: 157 MG/DL (ref 70–110)
PHOSPHATE SERPL-MCNC: 1.7 MG/DL (ref 2.7–4.5)
POTASSIUM SERPL-SCNC: 4.5 MMOL/L (ref 3.5–5.1)
POTASSIUM SERPL-SCNC: 4.7 MMOL/L (ref 3.5–5.1)
PTH-INTACT SERPL-MCNC: 521.7 PG/ML (ref 9–77)
PTH-INTACT SERPL-MCNC: 811.3 PG/ML
SODIUM SERPL-SCNC: 137 MMOL/L (ref 136–145)
SODIUM SERPL-SCNC: 138 MMOL/L (ref 136–145)

## 2024-09-24 PROCEDURE — 37000009 HC ANESTHESIA EA ADD 15 MINS: Performed by: STUDENT IN AN ORGANIZED HEALTH CARE EDUCATION/TRAINING PROGRAM

## 2024-09-24 PROCEDURE — 81025 URINE PREGNANCY TEST: CPT | Performed by: STUDENT IN AN ORGANIZED HEALTH CARE EDUCATION/TRAINING PROGRAM

## 2024-09-24 PROCEDURE — 36000706: Performed by: STUDENT IN AN ORGANIZED HEALTH CARE EDUCATION/TRAINING PROGRAM

## 2024-09-24 PROCEDURE — 83970 ASSAY OF PARATHORMONE: CPT | Performed by: STUDENT IN AN ORGANIZED HEALTH CARE EDUCATION/TRAINING PROGRAM

## 2024-09-24 PROCEDURE — 80048 BASIC METABOLIC PNL TOTAL CA: CPT

## 2024-09-24 PROCEDURE — 88305 TISSUE EXAM BY PATHOLOGIST: CPT | Mod: 26,,, | Performed by: PATHOLOGY

## 2024-09-24 PROCEDURE — 63600175 PHARM REV CODE 636 W HCPCS: Mod: JZ,JG | Performed by: STUDENT IN AN ORGANIZED HEALTH CARE EDUCATION/TRAINING PROGRAM

## 2024-09-24 PROCEDURE — 88341 IMHCHEM/IMCYTCHM EA ADD ANTB: CPT | Mod: 26,,, | Performed by: PATHOLOGY

## 2024-09-24 PROCEDURE — 88305 TISSUE EXAM BY PATHOLOGIST: CPT | Mod: 59 | Performed by: PATHOLOGY

## 2024-09-24 PROCEDURE — 0GBL0ZZ EXCISION OF RIGHT SUPERIOR PARATHYROID GLAND, OPEN APPROACH: ICD-10-PCS | Performed by: STUDENT IN AN ORGANIZED HEALTH CARE EDUCATION/TRAINING PROGRAM

## 2024-09-24 PROCEDURE — 88342 IMHCHEM/IMCYTCHM 1ST ANTB: CPT | Performed by: PATHOLOGY

## 2024-09-24 PROCEDURE — 36000707: Performed by: STUDENT IN AN ORGANIZED HEALTH CARE EDUCATION/TRAINING PROGRAM

## 2024-09-24 PROCEDURE — 20600001 HC STEP DOWN PRIVATE ROOM

## 2024-09-24 PROCEDURE — 88331 PATH CONSLTJ SURG 1 BLK 1SPC: CPT | Mod: 26,,, | Performed by: PATHOLOGY

## 2024-09-24 PROCEDURE — 63600175 PHARM REV CODE 636 W HCPCS

## 2024-09-24 PROCEDURE — 25000003 PHARM REV CODE 250

## 2024-09-24 PROCEDURE — 94761 N-INVAS EAR/PLS OXIMETRY MLT: CPT

## 2024-09-24 PROCEDURE — 83970 ASSAY OF PARATHORMONE: CPT | Mod: 91

## 2024-09-24 PROCEDURE — 80069 RENAL FUNCTION PANEL: CPT

## 2024-09-24 PROCEDURE — 27201037 HC PRESSURE MONITORING SET UP

## 2024-09-24 PROCEDURE — 63600175 PHARM REV CODE 636 W HCPCS: Performed by: NURSE ANESTHETIST, CERTIFIED REGISTERED

## 2024-09-24 PROCEDURE — 60500 EXPLORE PARATHYROID GLANDS: CPT | Mod: ,,, | Performed by: STUDENT IN AN ORGANIZED HEALTH CARE EDUCATION/TRAINING PROGRAM

## 2024-09-24 PROCEDURE — 71000033 HC RECOVERY, INTIAL HOUR: Performed by: STUDENT IN AN ORGANIZED HEALTH CARE EDUCATION/TRAINING PROGRAM

## 2024-09-24 PROCEDURE — 37000008 HC ANESTHESIA 1ST 15 MINUTES: Performed by: STUDENT IN AN ORGANIZED HEALTH CARE EDUCATION/TRAINING PROGRAM

## 2024-09-24 PROCEDURE — 36620 INSERTION CATHETER ARTERY: CPT | Mod: 59,,, | Performed by: STUDENT IN AN ORGANIZED HEALTH CARE EDUCATION/TRAINING PROGRAM

## 2024-09-24 PROCEDURE — 88341 IMHCHEM/IMCYTCHM EA ADD ANTB: CPT | Mod: 59 | Performed by: PATHOLOGY

## 2024-09-24 PROCEDURE — 71000015 HC POSTOP RECOV 1ST HR: Performed by: STUDENT IN AN ORGANIZED HEALTH CARE EDUCATION/TRAINING PROGRAM

## 2024-09-24 PROCEDURE — 25000003 PHARM REV CODE 250: Performed by: NURSE ANESTHETIST, CERTIFIED REGISTERED

## 2024-09-24 PROCEDURE — 88331 PATH CONSLTJ SURG 1 BLK 1SPC: CPT | Performed by: PATHOLOGY

## 2024-09-24 PROCEDURE — 27201423 OPTIME MED/SURG SUP & DEVICES STERILE SUPPLY: Performed by: STUDENT IN AN ORGANIZED HEALTH CARE EDUCATION/TRAINING PROGRAM

## 2024-09-24 PROCEDURE — 71000016 HC POSTOP RECOV ADDL HR: Performed by: STUDENT IN AN ORGANIZED HEALTH CARE EDUCATION/TRAINING PROGRAM

## 2024-09-24 PROCEDURE — 88342 IMHCHEM/IMCYTCHM 1ST ANTB: CPT | Mod: 26,,, | Performed by: PATHOLOGY

## 2024-09-24 RX ORDER — ALBUTEROL SULFATE 90 UG/1
2 INHALANT RESPIRATORY (INHALATION) 2 TIMES DAILY PRN
Status: DISCONTINUED | OUTPATIENT
Start: 2024-09-24 | End: 2024-09-27 | Stop reason: HOSPADM

## 2024-09-24 RX ORDER — OXYCODONE HYDROCHLORIDE 10 MG/1
10 TABLET ORAL EVERY 4 HOURS PRN
Status: DISCONTINUED | OUTPATIENT
Start: 2024-09-24 | End: 2024-09-25

## 2024-09-24 RX ORDER — FENTANYL CITRATE 50 UG/ML
INJECTION, SOLUTION INTRAMUSCULAR; INTRAVENOUS
Status: DISCONTINUED | OUTPATIENT
Start: 2024-09-24 | End: 2024-09-24

## 2024-09-24 RX ORDER — LIDOCAINE HYDROCHLORIDE 20 MG/ML
INJECTION, SOLUTION EPIDURAL; INFILTRATION; INTRACAUDAL; PERINEURAL
Status: DISCONTINUED | OUTPATIENT
Start: 2024-09-24 | End: 2024-09-24

## 2024-09-24 RX ORDER — LIDOCAINE 50 MG/G
1 PATCH TOPICAL
Status: DISCONTINUED | OUTPATIENT
Start: 2024-09-24 | End: 2024-09-26

## 2024-09-24 RX ORDER — OXYCODONE HYDROCHLORIDE 5 MG/1
5 TABLET ORAL EVERY 4 HOURS PRN
Status: DISCONTINUED | OUTPATIENT
Start: 2024-09-24 | End: 2024-09-26

## 2024-09-24 RX ORDER — EPHEDRINE SULFATE 50 MG/ML
INJECTION, SOLUTION INTRAVENOUS
Status: DISCONTINUED | OUTPATIENT
Start: 2024-09-24 | End: 2024-09-24

## 2024-09-24 RX ORDER — BUPIVACAINE HYDROCHLORIDE 2.5 MG/ML
INJECTION, SOLUTION EPIDURAL; INFILTRATION; INTRACAUDAL
Status: DISCONTINUED | OUTPATIENT
Start: 2024-09-24 | End: 2024-09-24 | Stop reason: HOSPADM

## 2024-09-24 RX ORDER — DEXAMETHASONE SODIUM PHOSPHATE 4 MG/ML
INJECTION, SOLUTION INTRA-ARTICULAR; INTRALESIONAL; INTRAMUSCULAR; INTRAVENOUS; SOFT TISSUE
Status: DISCONTINUED | OUTPATIENT
Start: 2024-09-24 | End: 2024-09-24

## 2024-09-24 RX ORDER — HALOPERIDOL 5 MG/ML
INJECTION INTRAMUSCULAR
Status: DISCONTINUED | OUTPATIENT
Start: 2024-09-24 | End: 2024-09-24

## 2024-09-24 RX ORDER — LIDOCAINE HYDROCHLORIDE 10 MG/ML
1 INJECTION, SOLUTION EPIDURAL; INFILTRATION; INTRACAUDAL; PERINEURAL ONCE
Status: DISCONTINUED | OUTPATIENT
Start: 2024-09-24 | End: 2024-09-25

## 2024-09-24 RX ORDER — SODIUM CHLORIDE, SODIUM LACTATE, POTASSIUM CHLORIDE, CALCIUM CHLORIDE 600; 310; 30; 20 MG/100ML; MG/100ML; MG/100ML; MG/100ML
INJECTION, SOLUTION INTRAVENOUS CONTINUOUS
Status: DISCONTINUED | OUTPATIENT
Start: 2024-09-24 | End: 2024-09-25

## 2024-09-24 RX ORDER — DEXMEDETOMIDINE HYDROCHLORIDE 100 UG/ML
INJECTION, SOLUTION INTRAVENOUS
Status: DISCONTINUED | OUTPATIENT
Start: 2024-09-24 | End: 2024-09-24

## 2024-09-24 RX ORDER — ACETAMINOPHEN 500 MG
1000 TABLET ORAL
Status: COMPLETED | OUTPATIENT
Start: 2024-09-24 | End: 2024-09-24

## 2024-09-24 RX ORDER — SUCCINYLCHOLINE CHLORIDE 20 MG/ML
INJECTION INTRAMUSCULAR; INTRAVENOUS
Status: DISCONTINUED | OUTPATIENT
Start: 2024-09-24 | End: 2024-09-24

## 2024-09-24 RX ORDER — HYDROMORPHONE HYDROCHLORIDE 1 MG/ML
0.2 INJECTION, SOLUTION INTRAMUSCULAR; INTRAVENOUS; SUBCUTANEOUS EVERY 5 MIN PRN
Status: DISCONTINUED | OUTPATIENT
Start: 2024-09-24 | End: 2024-09-24 | Stop reason: HOSPADM

## 2024-09-24 RX ORDER — KETAMINE HCL IN 0.9 % NACL 50 MG/5 ML
SYRINGE (ML) INTRAVENOUS
Status: DISCONTINUED | OUTPATIENT
Start: 2024-09-24 | End: 2024-09-24

## 2024-09-24 RX ORDER — MIDAZOLAM HYDROCHLORIDE 1 MG/ML
INJECTION INTRAMUSCULAR; INTRAVENOUS
Status: DISCONTINUED | OUTPATIENT
Start: 2024-09-24 | End: 2024-09-24

## 2024-09-24 RX ORDER — SODIUM CHLORIDE 0.9 % (FLUSH) 0.9 %
10 SYRINGE (ML) INJECTION
Status: DISCONTINUED | OUTPATIENT
Start: 2024-09-24 | End: 2024-09-24 | Stop reason: HOSPADM

## 2024-09-24 RX ORDER — PROCHLORPERAZINE EDISYLATE 5 MG/ML
5 INJECTION INTRAMUSCULAR; INTRAVENOUS EVERY 30 MIN PRN
Status: DISCONTINUED | OUTPATIENT
Start: 2024-09-24 | End: 2024-09-24 | Stop reason: HOSPADM

## 2024-09-24 RX ORDER — GLUCAGON 1 MG
1 KIT INJECTION
Status: DISCONTINUED | OUTPATIENT
Start: 2024-09-24 | End: 2024-09-24 | Stop reason: HOSPADM

## 2024-09-24 RX ORDER — SODIUM CHLORIDE 0.9 % (FLUSH) 0.9 %
10 SYRINGE (ML) INJECTION
Status: DISCONTINUED | OUTPATIENT
Start: 2024-09-24 | End: 2024-09-27 | Stop reason: HOSPADM

## 2024-09-24 RX ORDER — SODIUM CHLORIDE 0.9 % (FLUSH) 0.9 %
10 SYRINGE (ML) INJECTION
Status: DISCONTINUED | OUTPATIENT
Start: 2024-09-24 | End: 2024-09-25

## 2024-09-24 RX ORDER — ONDANSETRON HYDROCHLORIDE 2 MG/ML
4 INJECTION, SOLUTION INTRAVENOUS DAILY PRN
Status: DISCONTINUED | OUTPATIENT
Start: 2024-09-24 | End: 2024-09-24 | Stop reason: HOSPADM

## 2024-09-24 RX ORDER — PHENYLEPHRINE HYDROCHLORIDE 10 MG/ML
INJECTION INTRAVENOUS
Status: DISCONTINUED | OUTPATIENT
Start: 2024-09-24 | End: 2024-09-24

## 2024-09-24 RX ORDER — ONDANSETRON 8 MG/1
8 TABLET, ORALLY DISINTEGRATING ORAL EVERY 8 HOURS PRN
Status: DISCONTINUED | OUTPATIENT
Start: 2024-09-24 | End: 2024-09-25

## 2024-09-24 RX ORDER — PROPOFOL 10 MG/ML
VIAL (ML) INTRAVENOUS
Status: DISCONTINUED | OUTPATIENT
Start: 2024-09-24 | End: 2024-09-24

## 2024-09-24 RX ORDER — ACETAMINOPHEN 325 MG/1
650 TABLET ORAL EVERY 4 HOURS
Status: DISCONTINUED | OUTPATIENT
Start: 2024-09-24 | End: 2024-09-26

## 2024-09-24 RX ORDER — ONDANSETRON HYDROCHLORIDE 2 MG/ML
INJECTION, SOLUTION INTRAVENOUS
Status: DISCONTINUED | OUTPATIENT
Start: 2024-09-24 | End: 2024-09-24

## 2024-09-24 RX ADMIN — HYDROMORPHONE HYDROCHLORIDE 0.2 MG: 1 INJECTION, SOLUTION INTRAMUSCULAR; INTRAVENOUS; SUBCUTANEOUS at 06:09

## 2024-09-24 RX ADMIN — PHENYLEPHRINE HYDROCHLORIDE 100 MCG: 10 INJECTION INTRAVENOUS at 12:09

## 2024-09-24 RX ADMIN — PROPOFOL 50 MG: 10 INJECTION, EMULSION INTRAVENOUS at 04:09

## 2024-09-24 RX ADMIN — OXYCODONE HYDROCHLORIDE 5 MG: 5 TABLET ORAL at 09:09

## 2024-09-24 RX ADMIN — EPHEDRINE SULFATE 5 MG: 50 INJECTION INTRAVENOUS at 11:09

## 2024-09-24 RX ADMIN — DEXMEDETOMIDINE 14 MCG: 100 INJECTION, SOLUTION, CONCENTRATE INTRAVENOUS at 05:09

## 2024-09-24 RX ADMIN — EPHEDRINE SULFATE 10 MG: 50 INJECTION INTRAVENOUS at 11:09

## 2024-09-24 RX ADMIN — SUCCINYLCHOLINE 140 MG: 20 INJECTION, SOLUTION INTRAMUSCULAR; INTRAVENOUS at 11:09

## 2024-09-24 RX ADMIN — FENTANYL CITRATE 50 MCG: 50 INJECTION, SOLUTION INTRAMUSCULAR; INTRAVENOUS at 05:09

## 2024-09-24 RX ADMIN — HALOPERIDOL LACTATE 1 MG: 5 INJECTION, SOLUTION INTRAMUSCULAR at 02:09

## 2024-09-24 RX ADMIN — Medication 15 MG: at 12:09

## 2024-09-24 RX ADMIN — LIDOCAINE HYDROCHLORIDE 100 MG: 20 INJECTION, SOLUTION EPIDURAL; INFILTRATION; INTRACAUDAL; PERINEURAL at 11:09

## 2024-09-24 RX ADMIN — PHENYLEPHRINE HYDROCHLORIDE 200 MCG: 10 INJECTION INTRAVENOUS at 11:09

## 2024-09-24 RX ADMIN — DEXAMETHASONE SODIUM PHOSPHATE 8 MG: 4 INJECTION, SOLUTION INTRAMUSCULAR; INTRAVENOUS at 11:09

## 2024-09-24 RX ADMIN — ACETAMINOPHEN 650 MG: 325 TABLET ORAL at 08:09

## 2024-09-24 RX ADMIN — SODIUM CHLORIDE, POTASSIUM CHLORIDE, SODIUM LACTATE AND CALCIUM CHLORIDE: 600; 310; 30; 20 INJECTION, SOLUTION INTRAVENOUS at 06:09

## 2024-09-24 RX ADMIN — Medication 10 MG: at 04:09

## 2024-09-24 RX ADMIN — OXYCODONE HYDROCHLORIDE 10 MG: 10 TABLET ORAL at 06:09

## 2024-09-24 RX ADMIN — DEXMEDETOMIDINE 10 MCG: 100 INJECTION, SOLUTION, CONCENTRATE INTRAVENOUS at 05:09

## 2024-09-24 RX ADMIN — PHENYLEPHRINE HYDROCHLORIDE 100 MCG: 10 INJECTION INTRAVENOUS at 11:09

## 2024-09-24 RX ADMIN — FENTANYL CITRATE 100 MCG: 50 INJECTION, SOLUTION INTRAMUSCULAR; INTRAVENOUS at 11:09

## 2024-09-24 RX ADMIN — Medication 10 MG: at 03:09

## 2024-09-24 RX ADMIN — SODIUM CHLORIDE 0.2 MCG/KG/MIN: 9 INJECTION, SOLUTION INTRAVENOUS at 11:09

## 2024-09-24 RX ADMIN — SODIUM CHLORIDE: 0.9 INJECTION, SOLUTION INTRAVENOUS at 11:09

## 2024-09-24 RX ADMIN — Medication 10 MG: at 02:09

## 2024-09-24 RX ADMIN — ONDANSETRON 4 MG: 2 INJECTION INTRAMUSCULAR; INTRAVENOUS at 05:09

## 2024-09-24 RX ADMIN — PROPOFOL 200 MG: 10 INJECTION, EMULSION INTRAVENOUS at 11:09

## 2024-09-24 RX ADMIN — HYDROMORPHONE HYDROCHLORIDE 0.2 MG: 1 INJECTION, SOLUTION INTRAMUSCULAR; INTRAVENOUS; SUBCUTANEOUS at 05:09

## 2024-09-24 RX ADMIN — DEXMEDETOMIDINE 8 MCG: 100 INJECTION, SOLUTION, CONCENTRATE INTRAVENOUS at 04:09

## 2024-09-24 RX ADMIN — PHENYLEPHRINE HYDROCHLORIDE 100 MCG: 10 INJECTION INTRAVENOUS at 02:09

## 2024-09-24 RX ADMIN — Medication 15 MG: at 01:09

## 2024-09-24 RX ADMIN — SODIUM CHLORIDE, SODIUM GLUCONATE, SODIUM ACETATE, POTASSIUM CHLORIDE, MAGNESIUM CHLORIDE, SODIUM PHOSPHATE, DIBASIC, AND POTASSIUM PHOSPHATE: .53; .5; .37; .037; .03; .012; .00082 INJECTION, SOLUTION INTRAVENOUS at 11:09

## 2024-09-24 RX ADMIN — MIDAZOLAM HYDROCHLORIDE 2 MG: 2 INJECTION, SOLUTION INTRAMUSCULAR; INTRAVENOUS at 11:09

## 2024-09-24 RX ADMIN — ACETAMINOPHEN 1000 MG: 500 TABLET ORAL at 09:09

## 2024-09-24 RX ADMIN — EPHEDRINE SULFATE 5 MG: 50 INJECTION INTRAVENOUS at 01:09

## 2024-09-24 RX ADMIN — LIDOCAINE 1 PATCH: 50 PATCH CUTANEOUS at 10:09

## 2024-09-24 RX ADMIN — SODIUM CHLORIDE, SODIUM GLUCONATE, SODIUM ACETATE, POTASSIUM CHLORIDE, MAGNESIUM CHLORIDE, SODIUM PHOSPHATE, DIBASIC, AND POTASSIUM PHOSPHATE: .53; .5; .37; .037; .03; .012; .00082 INJECTION, SOLUTION INTRAVENOUS at 02:09

## 2024-09-24 RX ADMIN — ACETAMINOPHEN 650 MG: 325 TABLET ORAL at 06:09

## 2024-09-24 NOTE — TRANSFER OF CARE
"Anesthesia Transfer of Care Note    Patient: Erika Jenkins    Procedure(s) Performed: Procedure(s):  PARATHYROIDECTOMY, Partial excision right superior parathyroid tumor    Patient location: PACU    Anesthesia Type: general    Transport from OR: Transported from OR on 6-10 L/min O2 by face mask with adequate spontaneous ventilation    Post pain: adequate analgesia    Post assessment: no apparent anesthetic complications and tolerated procedure well    Post vital signs: stable    Level of consciousness: awake and alert    Nausea/Vomiting: no nausea/vomiting    Complications: none    Transfer of care protocol was followed      Last vitals: Visit Vitals  BP (!) 151/87 (BP Location: Right arm, Patient Position: Lying)   Pulse 77   Temp 36.7 °C (98 °F) (Tympanic)   Resp 18   Ht 5' 7" (1.702 m)   Wt 135.1 kg (297 lb 13.5 oz)   SpO2 98%   Breastfeeding No   BMI 46.65 kg/m²     "

## 2024-09-24 NOTE — ANESTHESIA PROCEDURE NOTES
Intubation    Date/Time: 9/24/2024 11:12 AM    Performed by: Maxine Leiva MD  Authorized by: Radha Toro MD    Intubation:     Induction:  Intravenous    Intubated:  Postinduction    Mask Ventilation:  Easy with oral airway    Attempts:  1    Attempted By:  Resident anesthesiologist    Method of Intubation:  Video laryngoscopy    Laryngeal View Grade: Grade I - full view of cords      Difficult Airway Encountered?: No      Complications:  None    Airway Device:  EMG ETT (NIMS)    Airway Device Size:  7.0    Style/Cuff Inflation:  Cuffed    Inflation Amount (mL):  7    Tube secured:  22    Secured at:  The lips    Placement Verified By:  Capnometry    Complicating Factors:  None    Findings Post-Intubation:  BS equal bilateral

## 2024-09-24 NOTE — ANESTHESIA PROCEDURE NOTES
Arterial    Diagnosis: primary hyperparathyroidism    Patient location during procedure: done in OR  Timeout: 9/24/2024 11:20 AM  Procedure end time: 9/24/2024 11:25 AM    Staffing  Authorizing Provider: Radha Toro MD  Performing Provider: Maxine Leiva MD    Staffing  Performed by: Maxine Leiva MD  Authorized by: Radha Toro MD    Anesthesiologist was present at the time of the procedure.    Preanesthetic Checklist  Completed: patient identified, IV checked, site marked, risks and benefits discussed, surgical consent, monitors and equipment checked, pre-op evaluation, timeout performed and anesthesia consent givenArterial  Skin Prep: chlorhexidine gluconate and isopropyl alcohol  Local Infiltration: none  Orientation: right  Location: radial    Catheter Size: 20 G  Catheter placement by Ultrasound guidance. Heme positive aspiration all ports.   Vessel Caliber: patent  Needle advanced into vessel with real time Ultrasound guidance.Insertion Attempts: 1  Assessment  Dressing: secured with tape and tegaderm  Patient: Tolerated well

## 2024-09-25 ENCOUNTER — ANESTHESIA EVENT (OUTPATIENT)
Dept: SURGERY | Facility: HOSPITAL | Age: 49
End: 2024-09-25
Payer: MEDICAID

## 2024-09-25 LAB
ALBUMIN SERPL BCP-MCNC: 3.5 G/DL (ref 3.5–5.2)
ANION GAP SERPL CALC-SCNC: 6 MMOL/L (ref 8–16)
BASOPHILS # BLD AUTO: 0.02 K/UL (ref 0–0.2)
BASOPHILS NFR BLD: 0.2 % (ref 0–1.9)
BUN SERPL-MCNC: 12 MG/DL (ref 6–20)
CALCIUM SERPL-MCNC: 9.1 MG/DL (ref 8.7–10.5)
CHLORIDE SERPL-SCNC: 111 MMOL/L (ref 95–110)
CO2 SERPL-SCNC: 20 MMOL/L (ref 23–29)
CREAT SERPL-MCNC: 0.8 MG/DL (ref 0.5–1.4)
DIFFERENTIAL METHOD BLD: ABNORMAL
EOSINOPHIL # BLD AUTO: 0 K/UL (ref 0–0.5)
EOSINOPHIL NFR BLD: 0.1 % (ref 0–8)
ERYTHROCYTE [DISTWIDTH] IN BLOOD BY AUTOMATED COUNT: 12.6 % (ref 11.5–14.5)
EST. GFR  (NO RACE VARIABLE): >60 ML/MIN/1.73 M^2
GLUCOSE SERPL-MCNC: 109 MG/DL (ref 70–110)
HCT VFR BLD AUTO: 40.1 % (ref 37–48.5)
HGB BLD-MCNC: 13.4 G/DL (ref 12–16)
IMM GRANULOCYTES # BLD AUTO: 0.05 K/UL (ref 0–0.04)
IMM GRANULOCYTES NFR BLD AUTO: 0.4 % (ref 0–0.5)
LYMPHOCYTES # BLD AUTO: 1.5 K/UL (ref 1–4.8)
LYMPHOCYTES NFR BLD: 12.1 % (ref 18–48)
MAGNESIUM SERPL-MCNC: 2.2 MG/DL (ref 1.6–2.6)
MCH RBC QN AUTO: 31.9 PG (ref 27–31)
MCHC RBC AUTO-ENTMCNC: 33.4 G/DL (ref 32–36)
MCV RBC AUTO: 96 FL (ref 82–98)
MONOCYTES # BLD AUTO: 0.8 K/UL (ref 0.3–1)
MONOCYTES NFR BLD: 6.6 % (ref 4–15)
NEUTROPHILS # BLD AUTO: 10.1 K/UL (ref 1.8–7.7)
NEUTROPHILS NFR BLD: 80.6 % (ref 38–73)
NRBC BLD-RTO: 0 /100 WBC
PHOSPHATE SERPL-MCNC: 1.5 MG/DL (ref 2.7–4.5)
PHOSPHATE SERPL-MCNC: 1.5 MG/DL (ref 2.7–4.5)
PLATELET # BLD AUTO: 308 K/UL (ref 150–450)
PMV BLD AUTO: 9.2 FL (ref 9.2–12.9)
POTASSIUM SERPL-SCNC: 4.2 MMOL/L (ref 3.5–5.1)
RBC # BLD AUTO: 4.2 M/UL (ref 4–5.4)
SODIUM SERPL-SCNC: 137 MMOL/L (ref 136–145)
WBC # BLD AUTO: 12.5 K/UL (ref 3.9–12.7)

## 2024-09-25 PROCEDURE — 99024 POSTOP FOLLOW-UP VISIT: CPT | Mod: ,,, | Performed by: STUDENT IN AN ORGANIZED HEALTH CARE EDUCATION/TRAINING PROGRAM

## 2024-09-25 PROCEDURE — 20600001 HC STEP DOWN PRIVATE ROOM

## 2024-09-25 PROCEDURE — 83735 ASSAY OF MAGNESIUM: CPT

## 2024-09-25 PROCEDURE — 80069 RENAL FUNCTION PANEL: CPT

## 2024-09-25 PROCEDURE — 94761 N-INVAS EAR/PLS OXIMETRY MLT: CPT

## 2024-09-25 PROCEDURE — 36415 COLL VENOUS BLD VENIPUNCTURE: CPT

## 2024-09-25 PROCEDURE — 63600175 PHARM REV CODE 636 W HCPCS

## 2024-09-25 PROCEDURE — 85025 COMPLETE CBC W/AUTO DIFF WBC: CPT

## 2024-09-25 PROCEDURE — 25000003 PHARM REV CODE 250

## 2024-09-25 PROCEDURE — 25000003 PHARM REV CODE 250: Performed by: STUDENT IN AN ORGANIZED HEALTH CARE EDUCATION/TRAINING PROGRAM

## 2024-09-25 RX ORDER — POLYETHYLENE GLYCOL 3350 17 G/17G
17 POWDER, FOR SOLUTION ORAL DAILY
Status: DISCONTINUED | OUTPATIENT
Start: 2024-09-25 | End: 2024-09-26

## 2024-09-25 RX ORDER — METHOCARBAMOL 750 MG/1
750 TABLET, FILM COATED ORAL 4 TIMES DAILY
Status: DISCONTINUED | OUTPATIENT
Start: 2024-09-25 | End: 2024-09-26

## 2024-09-25 RX ORDER — PROCHLORPERAZINE EDISYLATE 5 MG/ML
2.5 INJECTION INTRAMUSCULAR; INTRAVENOUS EVERY 6 HOURS PRN
Status: DISCONTINUED | OUTPATIENT
Start: 2024-09-25 | End: 2024-09-27

## 2024-09-25 RX ORDER — ONDANSETRON HYDROCHLORIDE 2 MG/ML
4 INJECTION, SOLUTION INTRAVENOUS EVERY 6 HOURS PRN
Status: DISCONTINUED | OUTPATIENT
Start: 2024-09-25 | End: 2024-09-27

## 2024-09-25 RX ORDER — METHOCARBAMOL 500 MG/1
500 TABLET, FILM COATED ORAL 4 TIMES DAILY
Status: DISCONTINUED | OUTPATIENT
Start: 2024-09-25 | End: 2024-09-25

## 2024-09-25 RX ORDER — SODIUM CHLORIDE, SODIUM LACTATE, POTASSIUM CHLORIDE, CALCIUM CHLORIDE 600; 310; 30; 20 MG/100ML; MG/100ML; MG/100ML; MG/100ML
INJECTION, SOLUTION INTRAVENOUS CONTINUOUS
Status: DISCONTINUED | OUTPATIENT
Start: 2024-09-26 | End: 2024-09-27

## 2024-09-25 RX ORDER — GABAPENTIN 300 MG/1
300 CAPSULE ORAL 3 TIMES DAILY
Status: DISCONTINUED | OUTPATIENT
Start: 2024-09-25 | End: 2024-09-26

## 2024-09-25 RX ADMIN — GABAPENTIN 300 MG: 300 CAPSULE ORAL at 08:09

## 2024-09-25 RX ADMIN — METHOCARBAMOL 750 MG: 750 TABLET ORAL at 09:09

## 2024-09-25 RX ADMIN — POLYETHYLENE GLYCOL 3350 17 G: 17 POWDER, FOR SOLUTION ORAL at 02:09

## 2024-09-25 RX ADMIN — SODIUM PHOSPHATE, MONOBASIC, MONOHYDRATE AND SODIUM PHOSPHATE, DIBASIC, ANHYDROUS 30 MMOL: 142; 276 INJECTION, SOLUTION INTRAVENOUS at 02:09

## 2024-09-25 RX ADMIN — GABAPENTIN 300 MG: 300 CAPSULE ORAL at 02:09

## 2024-09-25 RX ADMIN — OXYCODONE HYDROCHLORIDE 10 MG: 10 TABLET ORAL at 12:09

## 2024-09-25 RX ADMIN — METHOCARBAMOL 750 MG: 750 TABLET ORAL at 05:09

## 2024-09-25 RX ADMIN — OXYCODONE HYDROCHLORIDE 10 MG: 10 TABLET ORAL at 03:09

## 2024-09-25 RX ADMIN — ACETAMINOPHEN 650 MG: 325 TABLET ORAL at 02:09

## 2024-09-25 RX ADMIN — ACETAMINOPHEN 650 MG: 325 TABLET ORAL at 09:09

## 2024-09-25 RX ADMIN — OXYCODONE HYDROCHLORIDE 10 MG: 10 TABLET ORAL at 07:09

## 2024-09-25 RX ADMIN — ONDANSETRON 4 MG: 2 INJECTION INTRAMUSCULAR; INTRAVENOUS at 08:09

## 2024-09-25 RX ADMIN — GABAPENTIN 300 MG: 300 CAPSULE ORAL at 09:09

## 2024-09-25 RX ADMIN — METHOCARBAMOL 500 MG: 500 TABLET ORAL at 08:09

## 2024-09-25 RX ADMIN — METHOCARBAMOL 500 MG: 500 TABLET ORAL at 12:09

## 2024-09-25 RX ADMIN — OXYCODONE HYDROCHLORIDE 5 MG: 5 TABLET ORAL at 05:09

## 2024-09-25 RX ADMIN — ACETAMINOPHEN 650 MG: 325 TABLET ORAL at 05:09

## 2024-09-25 NOTE — NURSING TRANSFER
Nursing Transfer Note      9/24/2024   8:07 PM    Nurse giving handoff: SHAMIKA Begum PACU  Nurse receiving handoff: SHAMIKA Chung [facility]    Reason patient is being transferred: s/p partial parathyroidectomy     Transfer To: 1003    Transfer via bed    Transported by transport    Order for Tele Monitor? No    Any special needs or follow-up needed: routine care    Patient belongings transferred with patient: Yes    Chart send with patient: Yes    Notified: spouse    Patient reassessed at: 9/24/24 @ 1954 (date, time)

## 2024-09-25 NOTE — NURSING
Patient having severe right shoulder pain.  No other PRNs available for pain control at the moment.    On call general surgery notified, orders received for a lidocaine patch.    Patient reports that current pain management plan is not effective.

## 2024-09-25 NOTE — PLAN OF CARE
[facility]  Initial Discharge Assessment       Primary Care Provider: Mellissa Choudhary MD    Admission Diagnosis: Primary hyperparathyroidism [E21.0]  Hyperparathyroidism [E21.3]    Admission Date: 9/24/2024  Expected Discharge Date: 9/30/2024    Transition of Care Barriers: None    Payor: MEDICAID / Plan: [employer] [facility] ([employer]) / Product Type: Managed Medicaid /     Extended Emergency Contact Information  Primary Emergency Contact: NoonanHunter  Mobile Phone: [phone]  Relation: Significant other  Preferred language: English   needed? No    Discharge Plan A: Home with family  Discharge Plan B: [facility] - [place] - [address]  [address]  [place] [address]  Phone: [phone] Fax: [phone]      Initial Assessment (most recent)       Adult Discharge Assessment - 09/25/24 1232          Discharge Assessment    Assessment Type Discharge Planning Assessment     Confirmed/corrected address, phone number and insurance Yes     Confirmed Demographics Correct on Facesheet     Source of Information patient     Communicated JOSE G with patient/caregiver Yes     People in Home significant other     Do you expect to return to your current living situation? Yes     Do you have help at home or someone to help you manage your care at home? Yes     Prior to hospitilization cognitive status: Alert/Oriented     Current cognitive status: Alert/Oriented     Home Layout Able to live on 1st floor     Do you take prescription medications? Yes     Do you have prescription coverage? Yes     Do you have any problems affording any of your prescribed medications? No     Is the patient taking medications as prescribed? yes     Who is going to help you get home at discharge? significant other     How do you get to doctors appointments? car, drives self     Are you on dialysis? No     Do you take coumadin? No     Discharge Plan A Home with family     Discharge Plan  B Home Health     Discharge Plan discussed with: Patient     Transition of Care Barriers None     SDOH --   no       Physical Activity    On average, how many days per week do you engage in moderate to strenuous exercise (like a brisk walk)? 0 days     On average, how many minutes do you engage in exercise at this level? 0 min        Financial Resource Strain    How hard is it for you to pay for the very basics like food, housing, medical care, and heating? Very hard        Housing Stability    In the last 12 months, was there a time when you were not able to pay the mortgage or rent on time? No     At any time in the past 12 months, were you homeless or living in a shelter (including now)? No        Transportation Needs    Has the lack of transportation kept you from medical appointments, meetings, work or from getting things needed for daily living? No        Food Insecurity    Within the past 12 months, you worried that your food would run out before you got the money to buy more. Never true     Within the past 12 months, the food you bought just didn't last and you didn't have money to get more. Never true        Stress    Do you feel stress - tense, restless, nervous, or anxious, or unable to sleep at night because your mind is troubled all the time - these days? Not at all        Social Isolation    How often do you feel lonely or isolated from those around you?  Never        Alcohol Use    Q1: How often do you have a drink containing alcohol? Never     Q2: How many drinks containing alcohol do you have on a typical day when you are drinking? Patient does not drink     Q3: How often do you have six or more drinks on one occasion? Never        Utilities    In the past 12 months has the electric, gas, oil, or water company threatened to shut off services in your home? No        Health Literacy    How often do you need to have someone help you when you read instructions, pamphlets, or other written material from  your doctor or pharmacy? Never                   The CM met with the patient at bedside to complete the DPA. The CM placed name and contact information on the blackboard in the patient's room.  Use preferred pharmacy / bedside delivery for any necessary  medications at the time of discharge.The patient is independent with all ADLs. The patient is not on Dialysis or Coumadin. The patient's significant other will provide assistance to the patient upon discharge. The patient's significant other  will provide transportation upon discharge .  The CM will continue to follow for course of hospitalization.

## 2024-09-25 NOTE — PROGRESS NOTES
[facility]  General Surgery  Progress Note    Subjective:     History of Present Illness:  No notes on file    Post-Op Info:  Procedure(s):  PARATHYROIDECTOMY, Partial excision right superior parathyroid tumor   1 Day Post-Op     Interval History:   OR yesterday for partial resection of right superior parathyroid tumor. Did well overnight. Some musculoskeletal pain, will adjust regimen. Limited PO intake, but no N/V. Labs pending    Medications:  Continuous Infusions:  Scheduled Meds:   acetaminophen  650 mg Oral Q4H    LIDOcaine (PF) 10 mg/ml (1%)  1 mL Intradermal Once    LIDOcaine  1 patch Transdermal Q24H     PRN Meds:  Current Facility-Administered Medications:     albuterol, 2 puff, Inhalation, BID PRN    ondansetron, 8 mg, Oral, Q8H PRN    oxyCODONE, 5 mg, Oral, Q4H PRN    oxyCODONE, 10 mg, Oral, Q4H PRN    sodium chloride 0.9%, 10 mL, Intravenous, PRN     Review of patient's allergies indicates:   Allergen Reactions    Codeine     Penicillins Rash     Objective:     Vital Signs (Most Recent):  Temp: 98 °F (36.7 °C) (09/25/24 0407)  Pulse: 74 (09/25/24 0407)  Resp: 16 (09/25/24 0407)  BP: (!) 140/70 (09/25/24 0407)  SpO2: 99 % (09/25/24 0407) Vital Signs (24h Range):  Temp:  [98 °F (36.7 °C)-98.3 °F (36.8 °C)] 98 °F (36.7 °C)  Pulse:  [66-96] 74  Resp:  [14-28] 16  SpO2:  [94 %-100 %] 99 %  BP: (124-151)/(61-87) 140/70     Weight: 135.1 kg (297 lb 13.5 oz)  Body mass index is 46.65 kg/m².    Intake/Output - Last 3 Shifts         09/23 0700 09/24 0659 09/24 0700 09/25 0659    P.O.  300    IV Piggyback  2550    Total Intake(mL/kg)  2850 (21.1)    Urine (mL/kg/hr)  950    Total Output  950    Net  +1900          Urine Occurrence  2 x             Physical Exam  Constitutional:       Appearance: Normal appearance.   HENT:      Head: Normocephalic and atraumatic.      Nose: Nose normal.      Mouth/Throat:      Mouth: Mucous membranes are moist.      Pharynx: Oropharynx is clear.   Eyes:      Extraocular  "Movements: Extraocular movements intact.      Conjunctiva/sclera: Conjunctivae normal.   Neck:      Comments: Incision c/d/I with surgical dressing overlaying  Cardiovascular:      Rate and Rhythm: Normal rate and regular rhythm.   Pulmonary:      Effort: Pulmonary effort is normal.      Breath sounds: Normal breath sounds.   Abdominal:      General: Abdomen is flat.      Palpations: Abdomen is soft.   Skin:     General: Skin is warm and dry.      Capillary Refill: Capillary refill takes less than 2 seconds.   Neurological:      General: No focal deficit present.      Mental Status: She is alert.          Significant Labs:  I have reviewed all pertinent lab results within the past 24 hours.  CBC: No results for input(s): "WBC", "RBC", "HGB", "HCT", "PLT", "MCV", "MCH", "MCHC" in the last 168 hours.  CMP:   Recent Labs   Lab 09/24/24 1929   *   CALCIUM 9.2   ALBUMIN 3.4*      K 4.5   CO2 18*   *   BUN 10   CREATININE 0.9       Significant Diagnostics:  I have reviewed all pertinent imaging results/findings within the past 24 hours.  Assessment/Plan:     Primary hyperparathyroidism  49F s/p partial resection of a right superior parathyroid tumor. ENT to perform laryngoscope today. Plan to return to the OR tomorrow for resection of remainder of tumor and right recurrent laryngeal nerve resection w/ plastic surgery.     -- regular diet  -- MM pain control   -- home meds restarted as appropriate  -- daily CBC, RFP   -- ENT laryngoscope today, appreciate assistance    Dispo: will need to remain inpatient due to increased risk for right recurrent laryngeal nerve dysfunction in the post and jay jay op period.         Jania Hood MD  General Surgery  [facility]  "

## 2024-09-25 NOTE — OP NOTE
[facility]  Endocrine Surgery  Operative Report         Date of Procedure: 9/24/2024     Procedure: Procedure(s):  PARATHYROIDECTOMY, Partial excision right superior parathyroid tumor     Indications: This patient presents with primary hyperparathyroidism.  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  The patient now presents for a parathyroidectomy with intraoperative PTH monitoring.     Surgeons and Role:     * Zonia Quesada MD - Primary     * Jania Hood MD - Resident - Assisting (PGY4)    Pre-Operative Diagnosis: Primary hyperparathyroidism    Post-Operative Diagnosis: Parathyroid carcinoma, hypercalcemia    Anesthesia: General    Procedures:   Partial excision of right superior parathyroid tumor  Intraoperative monitoring and interpretation of right cranial nerves (vagus and recurrent laryngeal nerves) using the [employer] EMG endotracheal tube and [employer] NIM-Response 3.0 system     Intraoperative Findings:   Right superior parathyroid tumor with unequivocal gross invasion into the right recurrent laryngeal nerve, 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    Due to gross involvement of right recurrent laryngeal nerve, partial excision of medial portion of tumor sent for expedited permanent sectioning.  Frozen section biopsy confirmed parathyroid tissue, reported as infiltrative.   Normal appearing right inferior parathyroid gland.   The right recurrent laryngeal nerve signal proximal and distal to tumor as well as within tumor parenchyma despite absence of discernible epineurium within the tumor.  Approximately 4 cm length of recurrent laryngeal nerve invaded with tumor.  Approximately 8-10 mm cuff of grossly uninvolved recurrent laryngeal nerve at laryngeal insertion point.  Function of right recurrent laryngeal nerve and vagus nerve was verified preoperatively and confirmed postoperatively  using the nerve monitoring system.    Description of the Procedure:  The patient was seen in the Holding Room. The risks, benefits, complications, treatment options, and expected outcomes were discussed with the patient. The possibilities of reaction to medication, pulmonary aspiration, bleeding, infection, finding a normal parathyroid tissue, inability to identify all explored parathyroid glands, recurrent or persistent hyperparathyroidism, temporary or permanent hypocalcemia, temporary or permanent hypoparathyroidism, superior laryngeal or recurrent laryngeal nerve damage, the need for additional procedures, failure to diagnose a condition, creating a complication requiring transfusion or operation, stroke, death and imponderables. The patient concurred with the proposed plan and agreed to proceed despite the risks, giving informed consent.  The site of surgery was confirmed and marked. The patient was taken to Operating Room, identified as Erika Jenkins and the procedure verified as parathyroidectomy with intraoperative PTH monitoring.     Induction of general anesthesia was performed, the patient was intubated with an electromyography endotracheal tube, and the anesthesia team placed all appropriate lines.  A baseline PTH level was drawn and resulted as 811.3.  The patient was positioned by the operating room, anesthesia, and surgical staff in a modified beachchair position with a shoulder roll, the neck supported in an extended position, the arms padded and tucked.  An intraoperative ultrasound was performed prior to incision and identified the hypoechoic lesion along the posterior aspect of the right upper thyroid lobe consistent with the parathyroid adenoma candidate identified on ultrasound and nuclear medicine parathyroid scans.  The surgical field was prepped and draped in standard sterile fashion.  A timeout was performed.  A 5 cm transverse cervical incision was created below the cricoid cartilage within a  natural skin fold. Dissection was carried down through the platysma layer. Once this was completed, sub-platysmal flaps were raised superiorly to the thyroid cartilage and inferiorly to the sternal notch. The strap muscles were identified and divided at the midline. Sharp and blunt dissection were used to mobilize the right thyroid lobe in a medial direction.  A vagus signal was confirmed with the nerve monitoring system.      The right superior parathyroid gland was identified in the anticipated location along the inferior aspect of the right upper thyroid pole.  It was noted to be firm with a palpable plane along the thyroid and surrounding soft tissue.  Dissection was carried out laterally to fully expose the inferior and deep border of the parathyroid.  There was a thick fibrous capsule encountered overlying the parathyroid which was divided along the length of the lesion.  The dissection moved to the medial border along the thyroid.  Again, there was a fibrous capsule divided to access the polar vasculature medially.  Each polar vessel was ligated with the bipolar, tiny clip, or silk tie as appropriate.  The medial aspect was freed along the thyroid allowing for further mobilization.  Laterally, the inferior thyroid artery appeared to be within the fibrous capsule and thus was ligated with silk ties and divided along the inferolateral aspect of the parathyroid.  Then along the inferior pole of the parathyroid, the recurrent laryngeal nerve was identified, and initially it was thought to run along the inferior border of the parathyroid, however it appeared that the nerve splayed out and was coursing into the tumor.  The superior aspect of the parathyroid was then exposed and the recurrent laryngeal nerve was identified at the insertion point and dissection back to the parathyroid revealed gross invasion of this apparent parathyroid tumor here with splaying of the epineurium and tumor bulging between the splayed  edges of the nerve which was unequivocally grossly invading into the nerve.  The nerve tissue could not be distinguished or  from the parathyroid tumor where the nerve splayed.  Despite tedious and careful dissection, the nerve could not be  from the parathyroid tumor, and the tumor parenchyma between the proximal and distal ends of the visible recurrent laryngeal nerve conducted a nerve signal to the nerve monitor.    At this point, surgical oncology, endocrine surgery, and otolaryngology consultation was solicited and consensus was to partially excise the parathyroid tumor for permanent sectioning and pathologic diagnosis, leave the recurrent laryngeal nerve intact, discuss the diagnosis and options, complete pre-excision staging and planning, and anticipate return to the operating room for formal en bloc excision of the tumor with the right thyroid lobe, right central neck, and involved segment of right recurrent laryngeal nerve with possible nerve graft or ansa cervicalis nerve transfer.      Measurements were taken and an approximately 4 cm length of recurrent laryngeal nerve was directly involved with invasive parathyroid tumor.  Approximately 8-10 mm of distal recurrent laryngeal nerve cuff was grossly uninvolved by the parathyroid tumor at the laryngeal insertion point and was approximately 3-4 mm in diameter.  The proximal recurrent laryngeal nerve segment was not directly measured.  The tumor margins were marked with 5-0 Prolene at the superior aspect, just medial to the gross involvement of the distal recurrent laryngeal nerve, inferiorly just medial and superior to the proximal recurrent laryngeal nerve segment, and a small clip at the divided inferior thyroid artery was the inferolateral marker.    The surgical bed was irrigated and inspected carefully. Multiple Valsalva maneuvers were performed at 30-40 cm of water and additional hemostasis was achieved as necessary with focal  application of bipolar cautery. Proximal recurrent laryngeal and vagus nerve function were verified on the right using the nerve monitor prior to closure.  0.25% Marcaine was injected into the subcutaneous tissue of the incision for post-op analgesia. The strap muscles were then closed with a running 5-0 Prolene suture.  The platysma was closed with a running 5-0 Prolene suture and a single interrupted 5-0 Prolene, and the dermis was approximated with a 3-0 Vicryl.  Steri strips with Mastisol was applied to approximate the epidermis.  The incision was dressed with sterile gauze and Tegaderm.    No additional intraoperative PTH levels were drawn.  A debrief was performed.  Specimens were confirmed.  Instrument, sponge, and needle counts were reported correct prior to closure and at the conclusion of the case.  The family was updated at the completion of the case.      Estimated Blood Loss (EBL): less than 50 mL           Drains: None    Implants: None    Specimens:   Specimen (24h ago, onward)       Start     Ordered    09/24/24 1709  Specimen to Pathology, Surgery Thyroid, Parathyroid, and Adrenals  Once        Comments: Pre-op Diagnosis: Primary hyperparathyroidism [E21.0]Procedure(s):PARATHYROIDECTOMY Number of specimens: 2Name of specimens: 1. Right superior parathyroid biopsy-Frozen (sent to pathology)2. Partial Right superior parathyroid tumor-Permanent     References:    Click here for ordering Quick Tip   Question Answer Comment   Procedure Type: Thyroid, Parathyroid, and Adrenals    Specimen Class: Known or suspected malignancy        09/24/24 1709                           Condition: stable    Disposition: PACU - hemodynamically stable.    Attestation: I was present and scrubbed for the entire procedure.

## 2024-09-25 NOTE — SUBJECTIVE & OBJECTIVE
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 Did well overnight. Some musculoskeletal pain, will adjust regimen. Limited PO intake, but no N/V. Labs pending    Medications:  Continuous Infusions:  Scheduled Meds:   acetaminophen  650 mg Oral Q4H    LIDOcaine (PF) 10 mg/ml (1%)  1 mL Intradermal Once    LIDOcaine  1 patch Transdermal Q24H     PRN Meds:  Current Facility-Administered Medications:     albuterol, 2 puff, Inhalation, BID PRN    ondansetron, 8 mg, Oral, Q8H PRN    oxyCODONE, 5 mg, Oral, Q4H PRN    oxyCODONE, 10 mg, Oral, Q4H PRN    sodium chloride 0.9%, 10 mL, Intravenous, PRN     Review of patient's allergies indicates:   Allergen Reactions    Codeine     Penicillins Rash     Objective:     Vital Signs (Most Recent):  Temp: 98 °F (36.7 °C) (09/25/24 0407)  Pulse: 74 (09/25/24 0407)  Resp: 16 (09/25/24 0407)  BP: (!) 140/70 (09/25/24 0407)  SpO2: 99 % (09/25/24 0407) Vital Signs (24h Range):  Temp:  [98 °F (36.7 °C)-98.3 °F (36.8 °C)] 98 °F (36.7 °C)  Pulse:  [66-96] 74  Resp:  [14-28] 16  SpO2:  [94 %-100 %] 99 %  BP: (124-151)/(61-87) 140/70     Weight: 135.1 kg (297 lb 13.5 oz)  Body mass index is 46.65 kg/m².    Intake/Output - Last 3 Shifts         09/23 0700  09/24 0659 09/24 0700 09/25 0659    P.O.  300    IV Piggyback  2550    Total Intake(mL/kg)  2850 (21.1)    Urine (mL/kg/hr)  950    Total Output  950    Net  +1900          Urine Occurrence  2 x             Physical Exam  Constitutional:       Appearance: Normal appearance.   HENT:      Head: Normocephalic and atraumatic.      Nose: Nose normal.      Mouth/Throat:      Mouth: Mucous membranes are moist.      Pharynx: Oropharynx is clear.   Eyes:      Extraocular Movements: Extraocular movements intact.      Conjunctiva/sclera: Conjunctivae normal.   Neck:      Comments: Incision c/d/I with surgical dressing overlaying  Cardiovascular:      Rate and Rhythm: Normal rate and regular rhythm.  "  Pulmonary:      Effort: Pulmonary effort is normal.      Breath sounds: Normal breath sounds.   Abdominal:      General: Abdomen is flat.      Palpations: Abdomen is soft.   Skin:     General: Skin is warm and dry.      Capillary Refill: Capillary refill takes less than 2 seconds.   Neurological:      General: No focal deficit present.      Mental Status: She is alert.          Significant Labs:  I have reviewed all pertinent lab results within the past 24 hours.  CBC: No results for input(s): "WBC", "RBC", "HGB", "HCT", "PLT", "MCV", "MCH", "MCHC" in the last 168 hours.  CMP:   Recent Labs   Lab 09/24/24  1929   *   CALCIUM 9.2   ALBUMIN 3.4*      K 4.5   CO2 18*   *   BUN 10   CREATININE 0.9       Significant Diagnostics:  I have reviewed all pertinent imaging results/findings within the past 24 hours.  "

## 2024-09-25 NOTE — NURSING
Patient refusing labs until at least one IV is taken out from either arm.    One IV removed, lab notified to come back for labs

## 2024-09-25 NOTE — ANESTHESIA PREPROCEDURE EVALUATION
[facility]  Anesthesia Pre-Operative Evaluation         Patient Name: Erika Jenkins  YOB: 1975  MRN: [id number]    SUBJECTIVE:     Pre-operative Evaluation for Procedure(s) (LRB):  PARATHYROID CARCINOMA RESECTION WITH RIGHT THYROID LOBECTOMY (Right)     09/25/2024      Erika Jenkins is a 49 y.o. female with a PMHx significant for HTN, asthma, GERD, BMI 47, and hyperparathyroidism. Patient now presents for the above procedure(s).    The patient now presents for the above procedure(s).    Previous Airway    Intubation     Date/Time: 9/24/2024 11:12 AM     Performed by: Maxine Leiva MD  Authorized by: Radha Toro MD    Intubation:     Induction:  Intravenous    Intubated:  Postinduction    Mask Ventilation:  Easy with oral airway    Attempts:  1    Attempted By:  Resident anesthesiologist    Method of Intubation:  Video laryngoscopy    Laryngeal View Grade: Grade I - full view of cords      Difficult Airway Encountered?: No      Complications:  None    Airway Device:  EMG ETT (NIMS)    Airway Device Size:  7.0    Style/Cuff Inflation:  Cuffed    Inflation Amount (mL):  7    Tube secured:  22    Secured at:  The lips    Placement Verified By:  Capnometry    Complicating Factors:  None    Findings Post-Intubation:  BS equal bilateral       LDA:        Peripheral IV - Single Lumen 09/24/24 1130 20 G Left Wrist (Active)   Site Assessment Clean;Dry;Intact 09/25/24 1228   Extremity Assessment Distal to IV No abnormal discoloration;No redness;No swelling;No warmth 09/25/24 1228   Line Status Flushed;Saline locked 09/25/24 1228   Dressing Status Clean;Dry;Intact 09/25/24 1228   Dressing Intervention Integrity maintained 09/25/24 1228   Dressing Change Due 09/28/24 09/25/24 1228   Site Change Due 09/28/24 09/25/24 0800   Reason Not Rotated Not due 09/25/24 1228   Number of days: 1       Drips: None documented.\      Patient Active Problem List   Diagnosis    Morbid obesity with BMI of  45.0-49.9, adult    Dyslipidemia    Gastroesophageal reflux disease without esophagitis    Irritable bowel syndrome with both constipation and diarrhea    Prediabetes    Primary hypertension    Mild intermittent asthma without complication    Wellness examination    Inverse psoriasis    Primary hyperparathyroidism    Hypercalcemia       Past Medical History:   Diagnosis Date    Allergy     Anxiety disorder, unspecified     Asthma     Diverticulitis     Gastro-esophageal reflux disease without esophagitis     Hyperlipidemia     Hypertension     Kidney stones     Obesity, unspecified        Review of patient's allergies indicates:   Allergen Reactions    Codeine     Penicillins Rash       Current Inpatient Medications:   acetaminophen  650 mg Oral Q4H    gabapentin  300 mg Oral TID    LIDOcaine  1 patch Transdermal Q24H    methocarbamoL  750 mg Oral QID    polyethylene glycol  17 g Oral Daily    sodium phosphate 30 mmol in D5W 250 mL IVPB  30 mmol Intravenous Once       Current Outpatient Medications   Medication Instructions    albuterol (PROVENTIL/VENTOLIN HFA) 90 mcg/actuation inhaler 2 puffs, Inhalation, 2 times daily PRN    cinacalcet (SENSIPAR) 30 MG Tab 60 mg in AM and 30 mg in PM    dicyclomine (BENTYL) 10 MG capsule 30 capsules, 3 times daily    ibuprofen (ADVIL,MOTRIN) 800 mg, Oral, 3 times daily    LINZESS 145 mcg Cap capsule 90 capsules    lisinopriL 10 mg, Oral, Daily    montelukast (SINGULAIR) 10 mg, Oral, Daily    ondansetron (ZOFRAN-ODT) 4 MG TbDL 12 tablets    pantoprazole (PROTONIX) 40 mg, 2 times daily    potassium,sodium monobas phos (K-PHOS NO 2) 305-700 mg Tab 1 tablet, Oral, Daily       Past Surgical History:   Procedure Laterality Date    CHOLECYSTECTOMY      COLONOSCOPY W/ POLYPECTOMY  06/17/2024    Dr Jase Johnston    ELBOW SURGERY Left 12/16/2013    PARATHYROIDECTOMY  9/24/2024    Procedure: PARATHYROIDECTOMY, Partial excision right superior parathyroid tumor;  Surgeon: Zonia Quesada,  "MD;  Location: [facility] OR [address] [place] [address];  Service: General;;    TUBAL LIGATION         Social History     Substance and Sexual Activity   Drug Use Not Currently     Tobacco Use: Low Risk  (9/24/2024)    Patient History     Smoking Tobacco Use: Never     Smokeless Tobacco Use: Never     Passive Exposure: Not on file     Alcohol Use: Not At Risk (9/25/2024)    AUDIT-C     Frequency of Alcohol Consumption: Never     Average Number of Drinks: Patient does not drink     Frequency of Binge Drinking: Never         OBJECTIVE:     Vital Signs Range (Last 24H):  Temp:  [36.6 °C (97.8 °F)-36.8 °C (98.3 °F)]   Pulse:  []   Resp:  [14-28]   BP: (124-151)/(61-79)   SpO2:  [94 %-100 %]       Significant Labs    Heme Profile  Lab Results   Component Value Date    WBC 12.50 09/25/2024    HGB 13.4 09/25/2024    HCT 40.1 09/25/2024     09/25/2024       Coagulation Studies  No results found for: "LABPROT", "INR", "APTT"    BMP  Lab Results   Component Value Date     09/25/2024    K 4.2 09/25/2024     (H) 09/25/2024    CO2 20 (L) 09/25/2024    BUN 12 09/25/2024    CREATININE 0.8 09/25/2024    MG 2.2 09/25/2024    PHOS 1.5 (L) 09/25/2024    PHOS 1.5 (L) 09/25/2024    CAION 1.64 (H) 07/23/2024       Liver Function Tests  Lab Results   Component Value Date    AST 25 07/23/2024    ALT 30 07/23/2024    ALKPHOS 178 (H) 07/23/2024    BILITOT 1.0 07/23/2024    PROT 7.3 07/23/2024    ALBUMIN 3.5 09/25/2024       Lipid Profile  Lab Results   Component Value Date    CHOL 189 07/23/2024    HDL 42 07/23/2024    TRIG 73 07/23/2024       Endocrine Profile  Lab Results   Component Value Date    HGBA1C 5.2 07/23/2024    TSH 2.830 07/23/2024       Cardiac Studies    EKG:   No results found for this or any previous visit.    STEVEN  No results found for this or any previous visit.      TTE  No results found for this or any previous visit.      Nuclear Stress Test   No results found for this or any previous visit.        ASSESSMENT/PLAN: "           Pre-op Assessment    I have reviewed the Patient Summary Reports.     I have reviewed the Nursing Notes. I have reviewed the NPO Status.   I have reviewed the Medications.     Review of Systems  Cardiovascular:     Hypertension                                        Pulmonary:    Asthma                    Renal/:   renal calculi               Hepatic/GI:     GERD             Endocrine:        Morbid Obesity / BMI > 40      Physical Exam  General: Well nourished, Cooperative and Alert    Airway:  Mallampati: I   Mouth Opening: Normal  TM Distance: Normal  Tongue: Normal  Neck ROM: Normal ROM    Dental:  Partial Dentures, Edentulous        Anesthesia Plan  Type of Anesthesia, risks & benefits discussed:    Anesthesia Type: Gen ETT  Intra-op Monitoring Plan: Standard ASA Monitors and Art Line  Post Op Pain Control Plan: multimodal analgesia and IV/PO Opioids PRN  Induction:  IV  Airway Plan: Direct and Video, Post-Induction  Informed Consent: Informed consent signed with the Patient and all parties understand the risks and agree with anesthesia plan.  All questions answered.   ASA Score: 2  Day of Surgery Review of History & Physical: H&P Update referred to the surgeon/provider.    Ready For Surgery From Anesthesia Perspective.     .

## 2024-09-25 NOTE — NURSING
Nurses Note -- 4 Eyes      9/24/2024   8:59 PM      Skin assessed during: Admit      [] No Altered Skin Integrity Present    []Prevention Measures Documented      [x] Yes- Altered Skin Integrity Present or Discovered   [] LDA Added if Not in Epic (Describe Wound)   [x] New Altered Skin Integrity was Present on Admit and Documented in LDA   [] Wound Image Taken    Wound Care Consulted? No    Attending Nurse:  Juliet Lowe RN/Staff Member:   Juan Carlos

## 2024-09-25 NOTE — ANESTHESIA POSTPROCEDURE EVALUATION
Anesthesia Post Evaluation    Patient: Erika Jenkins    Procedure(s) Performed: Procedure(s):  PARATHYROIDECTOMY, Partial excision right superior parathyroid tumor    Final Anesthesia Type: general      Patient location during evaluation: PACU  Patient participation: Yes- Able to Participate  Level of consciousness: awake and alert  Post-procedure vital signs: reviewed and stable  Pain management: adequate  Airway patency: patent    PONV status at discharge: No PONV  Anesthetic complications: no      Cardiovascular status: blood pressure returned to baseline  Respiratory status: unassisted  Hydration status: euvolemic  Follow-up not needed.              Vitals Value Taken Time   /70 09/25/24 0407   Temp 36.7 °C (98 °F) 09/25/24 0407   Pulse 74 09/25/24 0407   Resp 16 09/25/24 0407   SpO2 99 % 09/25/24 0407         Event Time   Out of Recovery 18:15:00         Pain/Jhoana Score: Pain Rating Prior to Med Admin: 10 (9/25/2024  3:40 AM)  Pain Rating Post Med Admin: 10 (9/25/2024  4:37 AM)  Jhoana Score: 10 (9/24/2024  6:15 PM)

## 2024-09-25 NOTE — CONSULTS
[facility]  Otorhinolaryngology-Head & Neck Surgery  Consult Note    Patient Name: Erika Jenkins  MRN: [id number]  Code Status: Full Code  Admission Date: 9/24/2024  Hospital Length of Stay: 0 days  Attending Physician: Zonia Quesada MD  Primary Care Provider: Mellissa Choudhary MD    Patient information was obtained from patient, past medical records, and ER records.     Inpatient consult to ENT  Consult performed by: Fabián Terry MD  Consult ordered by: Zonia Quesada MD        Subjective:     Chief Complaint/Reason for Admission: surgery    History of Present Illness: Erika Jenkins is a 49 y.o. female with past medical and surgical history as detailed below that presents to the hospital for parathyroidectomy with Dr. Quesada (endocrine surgery) on 9/24/2024 for hyperparathyroidism presumed to be due to adenoma. Per chart review, preoperative imaging localized to a likely a right superior parathyroid adenoma on ultrasound and nuclear medicine parathyroid scan. However intraoperative findings were concerning for possible parathyroid carcinoma given concern for invasion/involvement of right RLN though nerve noted to stimulate distal and proximal to tumor invasion; after extensive multidisciplinary collaboration intra-op decision made to abort remainder of procedure and plan for further workup and likely definitive resection w RLN sacrifice and plastics nerve graft at later date.     ENT consulted for laryngeal assessment prior to further intervention.     Upon discussion with patient she is tearful and expresses being overwhelmed by situation but overall demonstrates good understanding of present findings. She notes prior to procedure she did not note any voice changes, difficulty breathing/SOB/noisy breathing, dysphagia, odynophagia. She notes postoperatively her voice is the same as prior to surgery. Denies increased effort to speak or straining with speech. She does have occasional allergies but none recent.  There is history of GERD. There is no history of heartburn or coughing while supine. There is no history of stridor.     Presently,   does not have difficulty breathing   does not have difficulty handling secretions  Currently tolerating diet of both solids and liquids without difficulty    There is no prior history of neck surgery.   There is no prior history of chest/cardiac surgery.             Current anticoagulation:   Anticoagulants       None                 Medications:  Continuous Infusions:  Scheduled Meds:   acetaminophen  650 mg Oral Q4H    gabapentin  300 mg Oral TID    LIDOcaine  1 patch Transdermal Q24H    methocarbamoL  500 mg Oral QID     PRN Meds:  Current Facility-Administered Medications:     albuterol, 2 puff, Inhalation, BID PRN    ondansetron, 4 mg, Intravenous, Q6H PRN    oxyCODONE, 5 mg, Oral, Q4H PRN    oxyCODONE, 10 mg, Oral, Q4H PRN    prochlorperazine, 2.5 mg, Intravenous, Q6H PRN    sodium chloride 0.9%, 10 mL, Intravenous, PRN     No current facility-administered medications on file prior to encounter.     Current Outpatient Medications on File Prior to Encounter   Medication Sig    albuterol (PROVENTIL/VENTOLIN HFA) 90 mcg/actuation inhaler Inhale 2 puffs into the lungs 2 (two) times daily as needed for Wheezing or Shortness of Breath.    cinacalcet (SENSIPAR) 30 MG Tab 60 mg in AM and 30 mg in PM (Patient not taking: Reported on 9/20/2024)    dicyclomine (BENTYL) 10 MG capsule Take 30 capsules by mouth 3 (three) times daily. (Patient not taking: Reported on 9/20/2024)    LINZESS 145 mcg Cap capsule 90 capsules. (Patient not taking: Reported on 9/20/2024)    lisinopriL 10 MG tablet Take 1 tablet (10 mg total) by mouth once daily. (Patient not taking: Reported on 9/20/2024)    montelukast (SINGULAIR) 10 mg tablet Take 1 tablet (10 mg total) by mouth once daily. (Patient not taking: Reported on 9/20/2024)    ondansetron (ZOFRAN-ODT) 4 MG TbDL 12 tablets. (Patient not taking: Reported on  9/20/2024)    pantoprazole (PROTONIX) 40 MG tablet Take 40 mg by mouth 2 (two) times daily. (Patient not taking: Reported on 9/20/2024)    potassium,sodium monobas phos (K-PHOS NO 2) 305-700 mg Tab Take 1 tablet by mouth once daily. for 5 days (Patient not taking: Reported on 9/20/2024)       Review of patient's allergies indicates:   Allergen Reactions    Codeine     Penicillins Rash       Past Medical History:   Diagnosis Date    Allergy     Anxiety disorder, unspecified     Asthma     Diverticulitis     Gastro-esophageal reflux disease without esophagitis     Hyperlipidemia     Hypertension     Kidney stones     Obesity, unspecified      Past Surgical History:   Procedure Laterality Date    CHOLECYSTECTOMY      COLONOSCOPY W/ POLYPECTOMY  06/17/2024    Dr Laguna Genamy    ELBOW SURGERY Left 12/16/2013    PARATHYROIDECTOMY  9/24/2024    Procedure: PARATHYROIDECTOMY, Partial excision right superior parathyroid tumor;  Surgeon: Zonia Quesada MD;  Location: [facility] OR [address] [place] [address];  Service: General;;    TUBAL LIGATION       Family History       Problem Relation (Age of Onset)    Arthritis Mother    Cancer Mother, Sister, Maternal Grandmother    Diabetes Mother    Heart disease Mother    Hyperlipidemia Mother    Uterine cancer Sister, Maternal Grandmother, Other          Tobacco Use    Smoking status: Never    Smokeless tobacco: Never   Substance and Sexual Activity    Alcohol use: Not Currently    Drug use: Not Currently    Sexual activity: Yes     Partners: Male     Review of Systems   Constitutional:  Negative for chills, fever and unexpected weight change.   HENT:  Negative for congestion, drooling, facial swelling, sore throat, trouble swallowing and voice change.    Respiratory:  Negative for choking, shortness of breath and stridor.    Gastrointestinal:  Positive for nausea.   Musculoskeletal:  Positive for neck pain and neck stiffness.     Objective:     Vital Signs (Most Recent):  Temp: 98.3 °F (36.8 °C)  (09/25/24 0743)  Pulse: (!) 114 (09/25/24 0743)  Resp: 18 (09/25/24 0750)  BP: (!) 145/79 (09/25/24 0743)  SpO2: 96 % (09/25/24 0743) Vital Signs (24h Range):  Temp:  [98 °F (36.7 °C)-98.3 °F (36.8 °C)] 98.3 °F (36.8 °C)  Pulse:  [] 114  Resp:  [14-28] 18  SpO2:  [94 %-100 %] 96 %  BP: (124-151)/(61-87) 145/79     Weight: 135.1 kg (297 lb 13.5 oz)  Body mass index is 46.65 kg/m².         Physical Exam   NAD  Awake and alert, tearful but easily redirectable   MMM, anterior tongue mobile, FOM soft, OP patent w/ midline uvula   Neck w incision covered with bandage, mild tenderness to palpation as anticipated  Normal WOB, no stridor or stertor  Voice is euphonic without increased effort, nonbreathy, good projection      PROCEDURE: Flexible Fiberoptic Laryngoscope Exam  The risks, benefits, and alternatives to the procedure were explained. Patient/family agreed to procedure; verbal consent obtained. The nasal cavity was anesthetized with Afrin and topical lidocaine, then the scope was inserted into the nasal cavity. Examination of the nasal cavity, nasopharynx, oropharynx, hypopharynx, and larynx was performed; all were closely visualized to confirm presence or absence of erythema, edema, or structural lesions.     Pertinent exam findings include the following:  - Nasal cavity: no masses or lesions, pink mucosa, no purulence, septal spur  - Nasopharynx: no masses or lesions, dov wnl  - Oropharynx: no masses or lesions, tonsils WNL, BOT WNL  - Larynx and Hypopharynx: Mobility of bilateral TVF with normal adduction and abduction, minimal postintuabtion changes at posterior larynx            The scope was removed. The patient tolerated the procedure well without complications.ent tolerated the procedure well without complications.    Significant Labs:  CBC:   Recent Labs   Lab 09/25/24  0829   WBC 12.50   RBC 4.20   HGB 13.4   HCT 40.1      MCV 96   MCH 31.9*   MCHC 33.4     CMP:   Recent Labs   Lab  09/25/24  0829      CALCIUM 9.1   ALBUMIN 3.5      K 4.2   CO2 20*   *   BUN 12   CREATININE 0.8       Significant Diagnostics:  I have reviewed all pertinent imaging results/findings within the past 24 hours.    Assessment/Plan:     Primary hyperparathyroidism  Erika Jenkins is a 49 y.o. female presents to the hospital for parathyroidectomy with Dr. Quesada (endocrine surgery) on 9/24/2024 for hyperparathyroidism presumed to be due to adenoma; intraoperative findings were concerning for possible parathyroid carcinoma given concern for invasion/involvement of right RLN after extensive multidisciplinary collaboration intra-op decision made to abort remainder of procedure and plan for further workup and likely definitive resection w RLN sacrifice and plastics nerve graft at a later date.     ENT consulted for laryngeal assessment prior to further intervention. Bedside scope exam demonstrates bilateral mobility of TVF.    -- Will discuss findings with surgery team   -- Remainder of care per primary team  -- Please page ENT on call with any additional questions or concerns         VTE Risk Mitigation (From admission, onward)           Ordered     Place sequential compression device  Until discontinued         09/24/24 0915                    Thank you for your consult. I will follow-up with patient. Please contact us if you have any additional questions.    Fabián Terry MD  Otorhinolaryngology-Head & Neck Surgery  [facility]

## 2024-09-25 NOTE — PLAN OF CARE
Problem: Adult Inpatient Plan of Care  Goal: Plan of Care Review  9/25/2024 0652 by Juliet Apple RN  Outcome: Progressing  9/25/2024 0541 by Juliet Apple RN  Outcome: Progressing  Goal: Patient-Specific Goal (Individualized)  9/25/2024 0652 by Juliet Apple RN  Outcome: Progressing  9/25/2024 0541 by Juliet Apple RN  Outcome: Progressing  Goal: Absence of Hospital-Acquired Illness or Injury  9/25/2024 0652 by Juliet Apple RN  Outcome: Progressing  9/25/2024 0541 by Juliet Apple RN  Outcome: Progressing  Goal: Optimal Comfort and Wellbeing  9/25/2024 0652 by Juliet Apple RN  Outcome: Progressing  9/25/2024 0541 by Juliet Apple RN  Outcome: Progressing  Goal: Readiness for Transition of Care  Outcome: Progressing

## 2024-09-25 NOTE — HPI
Erika Jenkins is a 49 y.o. female with past medical and surgical history as detailed below that presents to the hospital for parathyroidectomy with Dr. Quesada (endocrine surgery) on 9/24/2024 for hyperparathyroidism presumed to be due to adenoma. Per chart review, preoperative imaging localized to a likely a right superior parathyroid adenoma on ultrasound and nuclear medicine parathyroid scan. However intraoperative findings were concerning for possible parathyroid carcinoma given concern for invasion/involvement of right RLN though nerve noted to stimulate distal and proximal to tumor invasion; after extensive multidisciplinary collaboration intra-op decision made to abort remainder of procedure and plan for further workup and likely definitive resection w RLN sacrifice and plastics nerve graft at later date.     ENT consulted for laryngeal assessment prior to further intervention.     Upon discussion with patient she is tearful and expresses being overwhelmed by situation but overall demonstrates good understanding of present findings. She notes prior to procedure she did not note any voice changes, difficulty breathing/SOB/noisy breathing, dysphagia, odynophagia. She notes postoperatively her voice is the same as prior to surgery. Denies increased effort to speak or straining with speech. She does have occasional allergies but none recent. There is history of GERD. There is no history of heartburn or coughing while supine. There is no history of stridor.     Presently,   does not have difficulty breathing   does not have difficulty handling secretions  Currently tolerating diet of both solids and liquids without difficulty    There is no prior history of neck surgery.   There is no prior history of chest/cardiac surgery.             Current anticoagulation:   Anticoagulants       None

## 2024-09-25 NOTE — SUBJECTIVE & OBJECTIVE
Medications:  Continuous Infusions:  Scheduled Meds:   acetaminophen  650 mg Oral Q4H    gabapentin  300 mg Oral TID    LIDOcaine  1 patch Transdermal Q24H    methocarbamoL  500 mg Oral QID     PRN Meds:  Current Facility-Administered Medications:     albuterol, 2 puff, Inhalation, BID PRN    ondansetron, 4 mg, Intravenous, Q6H PRN    oxyCODONE, 5 mg, Oral, Q4H PRN    oxyCODONE, 10 mg, Oral, Q4H PRN    prochlorperazine, 2.5 mg, Intravenous, Q6H PRN    sodium chloride 0.9%, 10 mL, Intravenous, PRN     No current facility-administered medications on file prior to encounter.     Current Outpatient Medications on File Prior to Encounter   Medication Sig    albuterol (PROVENTIL/VENTOLIN HFA) 90 mcg/actuation inhaler Inhale 2 puffs into the lungs 2 (two) times daily as needed for Wheezing or Shortness of Breath.    cinacalcet (SENSIPAR) 30 MG Tab 60 mg in AM and 30 mg in PM (Patient not taking: Reported on 9/20/2024)    dicyclomine (BENTYL) 10 MG capsule Take 30 capsules by mouth 3 (three) times daily. (Patient not taking: Reported on 9/20/2024)    LINZESS 145 mcg Cap capsule 90 capsules. (Patient not taking: Reported on 9/20/2024)    lisinopriL 10 MG tablet Take 1 tablet (10 mg total) by mouth once daily. (Patient not taking: Reported on 9/20/2024)    montelukast (SINGULAIR) 10 mg tablet Take 1 tablet (10 mg total) by mouth once daily. (Patient not taking: Reported on 9/20/2024)    ondansetron (ZOFRAN-ODT) 4 MG TbDL 12 tablets. (Patient not taking: Reported on 9/20/2024)    pantoprazole (PROTONIX) 40 MG tablet Take 40 mg by mouth 2 (two) times daily. (Patient not taking: Reported on 9/20/2024)    potassium,sodium monobas phos (K-PHOS NO 2) 305-700 mg Tab Take 1 tablet by mouth once daily. for 5 days (Patient not taking: Reported on 9/20/2024)       Review of patient's allergies indicates:   Allergen Reactions    Codeine     Penicillins Rash       Past Medical History:   Diagnosis Date    Allergy     Anxiety disorder,  unspecified     Asthma     Diverticulitis     Gastro-esophageal reflux disease without esophagitis     Hyperlipidemia     Hypertension     Kidney stones     Obesity, unspecified      Past Surgical History:   Procedure Laterality Date    CHOLECYSTECTOMY      COLONOSCOPY W/ POLYPECTOMY  06/17/2024    Dr Jase Johnston    ELBOW SURGERY Left 12/16/2013    PARATHYROIDECTOMY  9/24/2024    Procedure: PARATHYROIDECTOMY, Partial excision right superior parathyroid tumor;  Surgeon: Zonia Quesada MD;  Location: [facility] OR [address] [place] [address];  Service: General;;    TUBAL LIGATION       Family History       Problem Relation (Age of Onset)    Arthritis Mother    Cancer Mother, Sister, Maternal Grandmother    Diabetes Mother    Heart disease Mother    Hyperlipidemia Mother    Uterine cancer Sister, Maternal Grandmother, Other          Tobacco Use    Smoking status: Never    Smokeless tobacco: Never   Substance and Sexual Activity    Alcohol use: Not Currently    Drug use: Not Currently    Sexual activity: Yes     Partners: Male     Review of Systems   Constitutional:  Negative for chills, fever and unexpected weight change.   HENT:  Negative for congestion, drooling, facial swelling, sore throat, trouble swallowing and voice change.    Respiratory:  Negative for choking, shortness of breath and stridor.    Gastrointestinal:  Positive for nausea.   Musculoskeletal:  Positive for neck pain and neck stiffness.     Objective:     Vital Signs (Most Recent):  Temp: 98.3 °F (36.8 °C) (09/25/24 0743)  Pulse: (!) 114 (09/25/24 0743)  Resp: 18 (09/25/24 0750)  BP: (!) 145/79 (09/25/24 0743)  SpO2: 96 % (09/25/24 0743) Vital Signs (24h Range):  Temp:  [98 °F (36.7 °C)-98.3 °F (36.8 °C)] 98.3 °F (36.8 °C)  Pulse:  [] 114  Resp:  [14-28] 18  SpO2:  [94 %-100 %] 96 %  BP: (124-151)/(61-87) 145/79     Weight: 135.1 kg (297 lb 13.5 oz)  Body mass index is 46.65 kg/m².         Physical Exam   NAD  Awake and alert, tearful but easily redirectable    MMM, anterior tongue mobile, FOM soft, OP patent w/ midline uvula   Neck w incision covered with bandage, mild tenderness to palpation as anticipated  Normal WOB, no stridor or stertor  Voice is euphonic without increased effort, nonbreathy, good projection      PROCEDURE: Flexible Fiberoptic Laryngoscope Exam  The risks, benefits, and alternatives to the procedure were explained. Patient/family agreed to procedure; verbal consent obtained. The nasal cavity was anesthetized with Afrin and topical lidocaine, then the scope was inserted into the nasal cavity. Examination of the nasal cavity, nasopharynx, oropharynx, hypopharynx, and larynx was performed; all were closely visualized to confirm presence or absence of erythema, edema, or structural lesions.     Pertinent exam findings include the following:  - Nasal cavity: no masses or lesions, pink mucosa, no purulence, septal spur  - Nasopharynx: no masses or lesions, dov wnl  - Oropharynx: no masses or lesions, tonsils WNL, BOT WNL  - Larynx and Hypopharynx: Mobility of bilateral TVF with normal adduction and abduction, minimal postintuabtion changes at posterior larynx            The scope was removed. The patient tolerated the procedure well without complications.ent tolerated the procedure well without complications.    Significant Labs:  CBC:   Recent Labs   Lab 09/25/24  0829   WBC 12.50   RBC 4.20   HGB 13.4   HCT 40.1      MCV 96   MCH 31.9*   MCHC 33.4     CMP:   Recent Labs   Lab 09/25/24  0829      CALCIUM 9.1   ALBUMIN 3.5      K 4.2   CO2 20*   *   BUN 12   CREATININE 0.8       Significant Diagnostics:  I have reviewed all pertinent imaging results/findings within the past 24 hours.

## 2024-09-26 ENCOUNTER — ANESTHESIA (OUTPATIENT)
Dept: SURGERY | Facility: HOSPITAL | Age: 49
End: 2024-09-26
Payer: MEDICAID

## 2024-09-26 LAB
ABO + RH BLD: NORMAL
ALBUMIN SERPL BCP-MCNC: 3.1 G/DL (ref 3.5–5.2)
ALBUMIN SERPL BCP-MCNC: 3.2 G/DL (ref 3.5–5.2)
ANION GAP SERPL CALC-SCNC: 6 MMOL/L (ref 8–16)
ANION GAP SERPL CALC-SCNC: 8 MMOL/L (ref 8–16)
BASOPHILS # BLD AUTO: 0.05 K/UL (ref 0–0.2)
BASOPHILS NFR BLD: 0.7 % (ref 0–1.9)
BLD GP AB SCN CELLS X3 SERPL QL: NORMAL
BUN SERPL-MCNC: 11 MG/DL (ref 6–20)
BUN SERPL-MCNC: 12 MG/DL (ref 6–20)
CALCIUM SERPL-MCNC: 7.4 MG/DL (ref 8.7–10.5)
CALCIUM SERPL-MCNC: 8.4 MG/DL (ref 8.7–10.5)
CHLORIDE SERPL-SCNC: 111 MMOL/L (ref 95–110)
CHLORIDE SERPL-SCNC: 114 MMOL/L (ref 95–110)
CO2 SERPL-SCNC: 18 MMOL/L (ref 23–29)
CO2 SERPL-SCNC: 21 MMOL/L (ref 23–29)
CREAT SERPL-MCNC: 0.8 MG/DL (ref 0.5–1.4)
CREAT SERPL-MCNC: 1 MG/DL (ref 0.5–1.4)
DIFFERENTIAL METHOD BLD: ABNORMAL
EOSINOPHIL # BLD AUTO: 0.2 K/UL (ref 0–0.5)
EOSINOPHIL NFR BLD: 2.3 % (ref 0–8)
ERYTHROCYTE [DISTWIDTH] IN BLOOD BY AUTOMATED COUNT: 12.9 % (ref 11.5–14.5)
EST. GFR  (NO RACE VARIABLE): >60 ML/MIN/1.73 M^2
EST. GFR  (NO RACE VARIABLE): >60 ML/MIN/1.73 M^2
FINAL PATHOLOGIC DIAGNOSIS: ABNORMAL
FROZEN SECTION DIAGNOSIS: ABNORMAL
FROZEN SECTION FOOTNOTE: ABNORMAL
GLUCOSE SERPL-MCNC: 142 MG/DL (ref 70–110)
GLUCOSE SERPL-MCNC: 99 MG/DL (ref 70–110)
GROSS: ABNORMAL
HCT VFR BLD AUTO: 37 % (ref 37–48.5)
HGB BLD-MCNC: 12.6 G/DL (ref 12–16)
IMM GRANULOCYTES # BLD AUTO: 0.03 K/UL (ref 0–0.04)
IMM GRANULOCYTES NFR BLD AUTO: 0.4 % (ref 0–0.5)
LYMPHOCYTES # BLD AUTO: 2 K/UL (ref 1–4.8)
LYMPHOCYTES NFR BLD: 27.8 % (ref 18–48)
Lab: ABNORMAL
MAGNESIUM SERPL-MCNC: 2 MG/DL (ref 1.6–2.6)
MCH RBC QN AUTO: 32.4 PG (ref 27–31)
MCHC RBC AUTO-ENTMCNC: 34.1 G/DL (ref 32–36)
MCV RBC AUTO: 95 FL (ref 82–98)
MICROSCOPIC EXAM: ABNORMAL
MONOCYTES # BLD AUTO: 0.5 K/UL (ref 0.3–1)
MONOCYTES NFR BLD: 7.4 % (ref 4–15)
NEUTROPHILS # BLD AUTO: 4.3 K/UL (ref 1.8–7.7)
NEUTROPHILS NFR BLD: 61.4 % (ref 38–73)
NRBC BLD-RTO: 0 /100 WBC
PHOSPHATE SERPL-MCNC: 1.9 MG/DL (ref 2.7–4.5)
PLATELET # BLD AUTO: 263 K/UL (ref 150–450)
PMV BLD AUTO: 9.6 FL (ref 9.2–12.9)
POTASSIUM SERPL-SCNC: 4.2 MMOL/L (ref 3.5–5.1)
POTASSIUM SERPL-SCNC: 4.5 MMOL/L (ref 3.5–5.1)
PTH-INTACT SERPL-MCNC: 144.6 PG/ML (ref 9–77)
RBC # BLD AUTO: 3.89 M/UL (ref 4–5.4)
SODIUM SERPL-SCNC: 137 MMOL/L (ref 136–145)
SODIUM SERPL-SCNC: 141 MMOL/L (ref 136–145)
SPECIMEN OUTDATE: NORMAL
WBC # BLD AUTO: 7.04 K/UL (ref 3.9–12.7)

## 2024-09-26 PROCEDURE — 63600175 PHARM REV CODE 636 W HCPCS

## 2024-09-26 PROCEDURE — 36000706: Performed by: STUDENT IN AN ORGANIZED HEALTH CARE EDUCATION/TRAINING PROGRAM

## 2024-09-26 PROCEDURE — 0GTJ0ZZ RESECTION OF THYROID GLAND ISTHMUS, OPEN APPROACH: ICD-10-PCS | Performed by: STUDENT IN AN ORGANIZED HEALTH CARE EDUCATION/TRAINING PROGRAM

## 2024-09-26 PROCEDURE — 0KX20ZZ TRANSFER RIGHT NECK MUSCLE, OPEN APPROACH: ICD-10-PCS | Performed by: STUDENT IN AN ORGANIZED HEALTH CARE EDUCATION/TRAINING PROGRAM

## 2024-09-26 PROCEDURE — 63600175 PHARM REV CODE 636 W HCPCS: Performed by: NURSE ANESTHETIST, CERTIFIED REGISTERED

## 2024-09-26 PROCEDURE — 88307 TISSUE EXAM BY PATHOLOGIST: CPT | Mod: 26,,, | Performed by: STUDENT IN AN ORGANIZED HEALTH CARE EDUCATION/TRAINING PROGRAM

## 2024-09-26 PROCEDURE — 36000707: Performed by: STUDENT IN AN ORGANIZED HEALTH CARE EDUCATION/TRAINING PROGRAM

## 2024-09-26 PROCEDURE — 0GTL0ZZ RESECTION OF RIGHT SUPERIOR PARATHYROID GLAND, OPEN APPROACH: ICD-10-PCS | Performed by: STUDENT IN AN ORGANIZED HEALTH CARE EDUCATION/TRAINING PROGRAM

## 2024-09-26 PROCEDURE — 80069 RENAL FUNCTION PANEL: CPT

## 2024-09-26 PROCEDURE — 88331 PATH CONSLTJ SURG 1 BLK 1SPC: CPT | Performed by: STUDENT IN AN ORGANIZED HEALTH CARE EDUCATION/TRAINING PROGRAM

## 2024-09-26 PROCEDURE — 07T10ZZ RESECTION OF RIGHT NECK LYMPHATIC, OPEN APPROACH: ICD-10-PCS | Performed by: STUDENT IN AN ORGANIZED HEALTH CARE EDUCATION/TRAINING PROGRAM

## 2024-09-26 PROCEDURE — 86850 RBC ANTIBODY SCREEN: CPT | Performed by: STUDENT IN AN ORGANIZED HEALTH CARE EDUCATION/TRAINING PROGRAM

## 2024-09-26 PROCEDURE — 25000003 PHARM REV CODE 250

## 2024-09-26 PROCEDURE — 27201423 OPTIME MED/SURG SUP & DEVICES STERILE SUPPLY: Performed by: STUDENT IN AN ORGANIZED HEALTH CARE EDUCATION/TRAINING PROGRAM

## 2024-09-26 PROCEDURE — 71000015 HC POSTOP RECOV 1ST HR: Performed by: STUDENT IN AN ORGANIZED HEALTH CARE EDUCATION/TRAINING PROGRAM

## 2024-09-26 PROCEDURE — 25000003 PHARM REV CODE 250: Performed by: NURSE ANESTHETIST, CERTIFIED REGISTERED

## 2024-09-26 PROCEDURE — 64865 REPAIR OF FACIAL NERVE: CPT | Mod: ,,, | Performed by: SURGERY

## 2024-09-26 PROCEDURE — 37000009 HC ANESTHESIA EA ADD 15 MINS: Performed by: STUDENT IN AN ORGANIZED HEALTH CARE EDUCATION/TRAINING PROGRAM

## 2024-09-26 PROCEDURE — 88305 TISSUE EXAM BY PATHOLOGIST: CPT | Mod: 59 | Performed by: STUDENT IN AN ORGANIZED HEALTH CARE EDUCATION/TRAINING PROGRAM

## 2024-09-26 PROCEDURE — 36415 COLL VENOUS BLD VENIPUNCTURE: CPT | Performed by: STUDENT IN AN ORGANIZED HEALTH CARE EDUCATION/TRAINING PROGRAM

## 2024-09-26 PROCEDURE — 63600175 PHARM REV CODE 636 W HCPCS: Mod: JZ,JG | Performed by: NURSE ANESTHETIST, CERTIFIED REGISTERED

## 2024-09-26 PROCEDURE — 0GBL0ZZ EXCISION OF RIGHT SUPERIOR PARATHYROID GLAND, OPEN APPROACH: ICD-10-PCS | Performed by: STUDENT IN AN ORGANIZED HEALTH CARE EDUCATION/TRAINING PROGRAM

## 2024-09-26 PROCEDURE — 88305 TISSUE EXAM BY PATHOLOGIST: CPT | Mod: 26,,, | Performed by: STUDENT IN AN ORGANIZED HEALTH CARE EDUCATION/TRAINING PROGRAM

## 2024-09-26 PROCEDURE — 85025 COMPLETE CBC W/AUTO DIFF WBC: CPT

## 2024-09-26 PROCEDURE — 71000033 HC RECOVERY, INTIAL HOUR: Performed by: STUDENT IN AN ORGANIZED HEALTH CARE EDUCATION/TRAINING PROGRAM

## 2024-09-26 PROCEDURE — 00UM0KZ SUPPLEMENT FACIAL NERVE WITH NONAUTOLOGOUS TISSUE SUBSTITUTE, OPEN APPROACH: ICD-10-PCS | Performed by: SURGERY

## 2024-09-26 PROCEDURE — 0W9630Z DRAINAGE OF NECK WITH DRAINAGE DEVICE, PERCUTANEOUS APPROACH: ICD-10-PCS | Performed by: STUDENT IN AN ORGANIZED HEALTH CARE EDUCATION/TRAINING PROGRAM

## 2024-09-26 PROCEDURE — 27800903 OPTIME MED/SURG SUP & DEVICES OTHER IMPLANTS: Performed by: STUDENT IN AN ORGANIZED HEALTH CARE EDUCATION/TRAINING PROGRAM

## 2024-09-26 PROCEDURE — 88331 PATH CONSLTJ SURG 1 BLK 1SPC: CPT | Mod: 26,,, | Performed by: STUDENT IN AN ORGANIZED HEALTH CARE EDUCATION/TRAINING PROGRAM

## 2024-09-26 PROCEDURE — 20600001 HC STEP DOWN PRIVATE ROOM

## 2024-09-26 PROCEDURE — 25000242 PHARM REV CODE 250 ALT 637 W/ HCPCS

## 2024-09-26 PROCEDURE — 99222 1ST HOSP IP/OBS MODERATE 55: CPT | Mod: ,,, | Performed by: SURGERY

## 2024-09-26 PROCEDURE — 00BM0ZZ EXCISION OF FACIAL NERVE, OPEN APPROACH: ICD-10-PCS | Performed by: STUDENT IN AN ORGANIZED HEALTH CARE EDUCATION/TRAINING PROGRAM

## 2024-09-26 PROCEDURE — 01QK0ZZ REPAIR HEAD AND NECK SYMPATHETIC NERVE, OPEN APPROACH: ICD-10-PCS | Performed by: STUDENT IN AN ORGANIZED HEALTH CARE EDUCATION/TRAINING PROGRAM

## 2024-09-26 PROCEDURE — 63600175 PHARM REV CODE 636 W HCPCS: Mod: JZ,JG | Performed by: STUDENT IN AN ORGANIZED HEALTH CARE EDUCATION/TRAINING PROGRAM

## 2024-09-26 PROCEDURE — 83735 ASSAY OF MAGNESIUM: CPT

## 2024-09-26 PROCEDURE — 15733 MUSC MYOQ/FSCQ FLP H&N PEDCL: CPT | Mod: ,,, | Performed by: SURGERY

## 2024-09-26 PROCEDURE — 36415 COLL VENOUS BLD VENIPUNCTURE: CPT

## 2024-09-26 PROCEDURE — 83970 ASSAY OF PARATHORMONE: CPT

## 2024-09-26 PROCEDURE — 71000016 HC POSTOP RECOV ADDL HR: Performed by: STUDENT IN AN ORGANIZED HEALTH CARE EDUCATION/TRAINING PROGRAM

## 2024-09-26 PROCEDURE — 0DH673Z INSERTION OF INFUSION DEVICE INTO STOMACH, VIA NATURAL OR ARTIFICIAL OPENING: ICD-10-PCS | Performed by: STUDENT IN AN ORGANIZED HEALTH CARE EDUCATION/TRAINING PROGRAM

## 2024-09-26 PROCEDURE — 37000008 HC ANESTHESIA 1ST 15 MINUTES: Performed by: STUDENT IN AN ORGANIZED HEALTH CARE EDUCATION/TRAINING PROGRAM

## 2024-09-26 PROCEDURE — 88307 TISSUE EXAM BY PATHOLOGIST: CPT | Mod: 59 | Performed by: STUDENT IN AN ORGANIZED HEALTH CARE EDUCATION/TRAINING PROGRAM

## 2024-09-26 PROCEDURE — 86920 COMPATIBILITY TEST SPIN: CPT | Performed by: ANESTHESIOLOGY

## 2024-09-26 PROCEDURE — 0KXF0ZZ TRANSFER RIGHT TRUNK MUSCLE, OPEN APPROACH: ICD-10-PCS | Performed by: STUDENT IN AN ORGANIZED HEALTH CARE EDUCATION/TRAINING PROGRAM

## 2024-09-26 PROCEDURE — 80069 RENAL FUNCTION PANEL: CPT | Mod: 91

## 2024-09-26 DEVICE — IMPLANTABLE DEVICE: Type: IMPLANTABLE DEVICE | Site: NECK | Status: FUNCTIONAL

## 2024-09-26 RX ORDER — MIDAZOLAM HYDROCHLORIDE 1 MG/ML
INJECTION INTRAMUSCULAR; INTRAVENOUS
Status: DISCONTINUED | OUTPATIENT
Start: 2024-09-26 | End: 2024-09-26

## 2024-09-26 RX ORDER — SUCCINYLCHOLINE CHLORIDE 20 MG/ML
INJECTION INTRAMUSCULAR; INTRAVENOUS
Status: DISCONTINUED | OUTPATIENT
Start: 2024-09-26 | End: 2024-09-26

## 2024-09-26 RX ORDER — LIDOCAINE HYDROCHLORIDE 20 MG/ML
INJECTION, SOLUTION EPIDURAL; INFILTRATION; INTRACAUDAL; PERINEURAL
Status: DISCONTINUED | OUTPATIENT
Start: 2024-09-26 | End: 2024-09-26

## 2024-09-26 RX ORDER — POLYETHYLENE GLYCOL 3350 17 G/17G
17 POWDER, FOR SOLUTION ORAL DAILY
Status: DISCONTINUED | OUTPATIENT
Start: 2024-09-27 | End: 2024-09-27 | Stop reason: HOSPADM

## 2024-09-26 RX ORDER — ACETAMINOPHEN 650 MG/20.3ML
650 LIQUID ORAL EVERY 6 HOURS
Status: DISCONTINUED | OUTPATIENT
Start: 2024-09-27 | End: 2024-09-27

## 2024-09-26 RX ORDER — OXYCODONE HCL 5 MG/5 ML
5 SOLUTION, ORAL ORAL EVERY 4 HOURS PRN
Status: DISCONTINUED | OUTPATIENT
Start: 2024-09-26 | End: 2024-09-27

## 2024-09-26 RX ORDER — DEXMEDETOMIDINE HYDROCHLORIDE 100 UG/ML
INJECTION, SOLUTION INTRAVENOUS
Status: DISCONTINUED | OUTPATIENT
Start: 2024-09-26 | End: 2024-09-26

## 2024-09-26 RX ORDER — KETAMINE HCL IN 0.9 % NACL 50 MG/5 ML
SYRINGE (ML) INTRAVENOUS
Status: DISCONTINUED | OUTPATIENT
Start: 2024-09-26 | End: 2024-09-26

## 2024-09-26 RX ORDER — SODIUM CHLORIDE 0.9 % (FLUSH) 0.9 %
10 SYRINGE (ML) INJECTION
Status: DISCONTINUED | OUTPATIENT
Start: 2024-09-26 | End: 2024-09-26 | Stop reason: HOSPADM

## 2024-09-26 RX ORDER — GLUCAGON 1 MG
1 KIT INJECTION
Status: DISCONTINUED | OUTPATIENT
Start: 2024-09-26 | End: 2024-09-26 | Stop reason: HOSPADM

## 2024-09-26 RX ORDER — HYDROMORPHONE HYDROCHLORIDE 1 MG/ML
0.2 INJECTION, SOLUTION INTRAMUSCULAR; INTRAVENOUS; SUBCUTANEOUS EVERY 5 MIN PRN
Status: DISCONTINUED | OUTPATIENT
Start: 2024-09-26 | End: 2024-09-26 | Stop reason: HOSPADM

## 2024-09-26 RX ORDER — LIDOCAINE HYDROCHLORIDE 10 MG/ML
1 INJECTION, SOLUTION EPIDURAL; INFILTRATION; INTRACAUDAL; PERINEURAL ONCE AS NEEDED
Status: DISCONTINUED | OUTPATIENT
Start: 2024-09-26 | End: 2024-09-26 | Stop reason: HOSPADM

## 2024-09-26 RX ORDER — BUPIVACAINE HYDROCHLORIDE 2.5 MG/ML
INJECTION, SOLUTION EPIDURAL; INFILTRATION; INTRACAUDAL
Status: DISCONTINUED | OUTPATIENT
Start: 2024-09-26 | End: 2024-09-26 | Stop reason: HOSPADM

## 2024-09-26 RX ORDER — SUFENTANIL CITRATE 50 UG/ML
INJECTION EPIDURAL; INTRAVENOUS
Status: DISCONTINUED | OUTPATIENT
Start: 2024-09-26 | End: 2024-09-26

## 2024-09-26 RX ORDER — CEFAZOLIN SODIUM 1 G/3ML
INJECTION, POWDER, FOR SOLUTION INTRAMUSCULAR; INTRAVENOUS
Status: DISCONTINUED | OUTPATIENT
Start: 2024-09-26 | End: 2024-09-26

## 2024-09-26 RX ORDER — HYDROMORPHONE HYDROCHLORIDE 1 MG/ML
0.2 INJECTION, SOLUTION INTRAMUSCULAR; INTRAVENOUS; SUBCUTANEOUS ONCE
Status: COMPLETED | OUTPATIENT
Start: 2024-09-26 | End: 2024-09-26

## 2024-09-26 RX ORDER — PROPOFOL 10 MG/ML
VIAL (ML) INTRAVENOUS
Status: DISCONTINUED | OUTPATIENT
Start: 2024-09-26 | End: 2024-09-26

## 2024-09-26 RX ORDER — ONDANSETRON HYDROCHLORIDE 2 MG/ML
INJECTION, SOLUTION INTRAVENOUS
Status: DISCONTINUED | OUTPATIENT
Start: 2024-09-26 | End: 2024-09-26

## 2024-09-26 RX ORDER — SODIUM CHLORIDE 9 MG/ML
INJECTION, SOLUTION INTRAVENOUS CONTINUOUS
Status: DISCONTINUED | OUTPATIENT
Start: 2024-09-26 | End: 2024-09-26

## 2024-09-26 RX ORDER — FENTANYL CITRATE 50 UG/ML
INJECTION, SOLUTION INTRAMUSCULAR; INTRAVENOUS
Status: DISCONTINUED | OUTPATIENT
Start: 2024-09-26 | End: 2024-09-26

## 2024-09-26 RX ORDER — PHENYLEPHRINE HCL IN 0.9% NACL 1 MG/10 ML
SYRINGE (ML) INTRAVENOUS
Status: DISCONTINUED | OUTPATIENT
Start: 2024-09-26 | End: 2024-09-26

## 2024-09-26 RX ORDER — ACETAMINOPHEN 10 MG/ML
INJECTION, SOLUTION INTRAVENOUS
Status: DISCONTINUED | OUTPATIENT
Start: 2024-09-26 | End: 2024-09-26

## 2024-09-26 RX ORDER — DEXAMETHASONE SODIUM PHOSPHATE 4 MG/ML
INJECTION, SOLUTION INTRA-ARTICULAR; INTRALESIONAL; INTRAMUSCULAR; INTRAVENOUS; SOFT TISSUE
Status: DISCONTINUED | OUTPATIENT
Start: 2024-09-26 | End: 2024-09-26

## 2024-09-26 RX ORDER — HALOPERIDOL 5 MG/ML
0.5 INJECTION INTRAMUSCULAR EVERY 10 MIN PRN
Status: DISCONTINUED | OUTPATIENT
Start: 2024-09-26 | End: 2024-09-26 | Stop reason: HOSPADM

## 2024-09-26 RX ADMIN — Medication 10 MG: at 04:09

## 2024-09-26 RX ADMIN — HYDROMORPHONE HYDROCHLORIDE 0.2 MG: 1 INJECTION, SOLUTION INTRAMUSCULAR; INTRAVENOUS; SUBCUTANEOUS at 05:09

## 2024-09-26 RX ADMIN — SODIUM CHLORIDE: 9 INJECTION, SOLUTION INTRAVENOUS at 03:09

## 2024-09-26 RX ADMIN — DEXMEDETOMIDINE 16 MCG: 100 INJECTION, SOLUTION, CONCENTRATE INTRAVENOUS at 09:09

## 2024-09-26 RX ADMIN — CEFAZOLIN 3 G: 330 INJECTION, POWDER, FOR SOLUTION INTRAMUSCULAR; INTRAVENOUS at 03:09

## 2024-09-26 RX ADMIN — Medication 200 MCG: at 03:09

## 2024-09-26 RX ADMIN — ONDANSETRON 4 MG: 2 INJECTION INTRAMUSCULAR; INTRAVENOUS at 09:09

## 2024-09-26 RX ADMIN — HYDROMORPHONE HYDROCHLORIDE 0.2 MG: 1 INJECTION, SOLUTION INTRAMUSCULAR; INTRAVENOUS; SUBCUTANEOUS at 10:09

## 2024-09-26 RX ADMIN — SODIUM CHLORIDE, SODIUM GLUCONATE, SODIUM ACETATE, POTASSIUM CHLORIDE, MAGNESIUM CHLORIDE, SODIUM PHOSPHATE, DIBASIC, AND POTASSIUM PHOSPHATE: .53; .5; .37; .037; .03; .012; .00082 INJECTION, SOLUTION INTRAVENOUS at 07:09

## 2024-09-26 RX ADMIN — SUFENTANIL CITRATE 10 MCG: 50 INJECTION, SOLUTION EPIDURAL; INTRAVENOUS at 05:09

## 2024-09-26 RX ADMIN — ACETAMINOPHEN 1000 MG: 10 INJECTION INTRAVENOUS at 09:09

## 2024-09-26 RX ADMIN — PROPOFOL 50 MG: 10 INJECTION, EMULSION INTRAVENOUS at 07:09

## 2024-09-26 RX ADMIN — Medication 10 MG: at 05:09

## 2024-09-26 RX ADMIN — OXYCODONE HYDROCHLORIDE 5 MG: 5 TABLET ORAL at 03:09

## 2024-09-26 RX ADMIN — OXYCODONE HYDROCHLORIDE 5 MG: 5 TABLET ORAL at 08:09

## 2024-09-26 RX ADMIN — MIDAZOLAM HYDROCHLORIDE 2 MG: 2 INJECTION, SOLUTION INTRAMUSCULAR; INTRAVENOUS at 02:09

## 2024-09-26 RX ADMIN — DEXMEDETOMIDINE 12 MCG: 100 INJECTION, SOLUTION, CONCENTRATE INTRAVENOUS at 08:09

## 2024-09-26 RX ADMIN — GABAPENTIN 300 MG: 300 CAPSULE ORAL at 08:09

## 2024-09-26 RX ADMIN — METHOCARBAMOL 750 MG: 750 TABLET ORAL at 12:09

## 2024-09-26 RX ADMIN — SUFENTANIL CITRATE 40 MCG: 50 INJECTION, SOLUTION EPIDURAL; INTRAVENOUS at 03:09

## 2024-09-26 RX ADMIN — SUFENTANIL CITRATE 10 MCG: 50 INJECTION, SOLUTION EPIDURAL; INTRAVENOUS at 06:09

## 2024-09-26 RX ADMIN — SODIUM CHLORIDE, SODIUM GLUCONATE, SODIUM ACETATE, POTASSIUM CHLORIDE, MAGNESIUM CHLORIDE, SODIUM PHOSPHATE, DIBASIC, AND POTASSIUM PHOSPHATE: .53; .5; .37; .037; .03; .012; .00082 INJECTION, SOLUTION INTRAVENOUS at 03:09

## 2024-09-26 RX ADMIN — Medication 100 MCG: at 08:09

## 2024-09-26 RX ADMIN — DEXMEDETOMIDINE 12 MCG: 100 INJECTION, SOLUTION, CONCENTRATE INTRAVENOUS at 07:09

## 2024-09-26 RX ADMIN — LIDOCAINE HYDROCHLORIDE 100 MG: 20 INJECTION, SOLUTION EPIDURAL; INFILTRATION; INTRACAUDAL; PERINEURAL at 03:09

## 2024-09-26 RX ADMIN — PHENYLEPHRINE HYDROCHLORIDE 0.25 MCG/KG/MIN: 10 INJECTION INTRAVENOUS at 03:09

## 2024-09-26 RX ADMIN — ACETAMINOPHEN 650 MG: 325 TABLET ORAL at 03:09

## 2024-09-26 RX ADMIN — DEXAMETHASONE SODIUM PHOSPHATE 8 MG: 4 INJECTION, SOLUTION INTRAMUSCULAR; INTRAVENOUS at 04:09

## 2024-09-26 RX ADMIN — SUCCINYLCHOLINE 120 MG: 20 INJECTION, SOLUTION INTRAMUSCULAR; INTRAVENOUS at 03:09

## 2024-09-26 RX ADMIN — CEFAZOLIN 3 G: 330 INJECTION, POWDER, FOR SOLUTION INTRAMUSCULAR; INTRAVENOUS at 07:09

## 2024-09-26 RX ADMIN — PROPOFOL 200 MG: 10 INJECTION, EMULSION INTRAVENOUS at 03:09

## 2024-09-26 RX ADMIN — Medication 10 MG: at 08:09

## 2024-09-26 RX ADMIN — OXYCODONE HYDROCHLORIDE 5 MG: 5 SOLUTION ORAL at 10:09

## 2024-09-26 RX ADMIN — Medication 10 MG: at 07:09

## 2024-09-26 RX ADMIN — DEXMEDETOMIDINE 10 MCG: 100 INJECTION, SOLUTION, CONCENTRATE INTRAVENOUS at 09:09

## 2024-09-26 RX ADMIN — SODIUM CHLORIDE, SODIUM LACTATE, POTASSIUM CHLORIDE, AND CALCIUM CHLORIDE: 600; 310; 30; 20 INJECTION, SOLUTION INTRAVENOUS at 12:09

## 2024-09-26 RX ADMIN — FENTANYL CITRATE 50 MCG: 50 INJECTION, SOLUTION INTRAMUSCULAR; INTRAVENOUS at 09:09

## 2024-09-26 RX ADMIN — FENTANYL CITRATE 100 MCG: 50 INJECTION, SOLUTION INTRAMUSCULAR; INTRAVENOUS at 03:09

## 2024-09-26 RX ADMIN — ACETAMINOPHEN 650 MG: 325 TABLET ORAL at 12:09

## 2024-09-26 RX ADMIN — SUFENTANIL CITRATE 10 MCG: 50 INJECTION, SOLUTION EPIDURAL; INTRAVENOUS at 04:09

## 2024-09-26 RX ADMIN — METHOCARBAMOL 750 MG: 750 TABLET ORAL at 08:09

## 2024-09-26 RX ADMIN — ONDANSETRON 4 MG: 2 INJECTION INTRAMUSCULAR; INTRAVENOUS at 08:09

## 2024-09-26 NOTE — NURSING TRANSFER
Nursing Transfer Note      9/26/2024   1:30 PM    Nurse giving handoff:Lj RN  Nurse receiving handoff:Gretchen WICK    Reason patient is being transferred: scheduled procedure    Transfer To: [facility]    Transfer via wheelchair    Order for Tele Monitor? No    Chart send with patient: Yes    Notified: spouse at bedside

## 2024-09-26 NOTE — SUBJECTIVE & OBJECTIVE
Interval History:   No acute events overnight. Laryngoscope with ENT yesterday, normal vocal cord function. OR today for resection of remainder of parathyroid tumor, right thyroid lobectomy, right and central level VI lymph node dissection, and right recurrent laryngeal nerve resection w/ nerve grafting (w/ Dr. Raymond).     Medications:  Continuous Infusions:   lactated ringers   Intravenous Continuous 125 mL/hr at 09/26/24 0000 New Bag at 09/26/24 0000     Scheduled Meds:   acetaminophen  650 mg Oral Q4H    gabapentin  300 mg Oral TID    LIDOcaine  1 patch Transdermal Q24H    methocarbamoL  750 mg Oral QID    polyethylene glycol  17 g Oral Daily     PRN Meds:  Current Facility-Administered Medications:     albuterol, 2 puff, Inhalation, BID PRN    ondansetron, 4 mg, Intravenous, Q6H PRN    oxyCODONE, 5 mg, Oral, Q4H PRN    prochlorperazine, 2.5 mg, Intravenous, Q6H PRN    sodium chloride 0.9%, 10 mL, Intravenous, PRN     Review of patient's allergies indicates:   Allergen Reactions    Codeine     Penicillins Rash     Objective:     Vital Signs (Most Recent):  Temp: 98.6 °F (37 °C) (09/26/24 0821)  Pulse: 60 (09/26/24 0821)  Resp: 18 (09/26/24 0821)  BP: (!) 168/88 (09/26/24 0821)  SpO2: 97 % (09/26/24 0821) Vital Signs (24h Range):  Temp:  [97.8 °F (36.6 °C)-98.6 °F (37 °C)] 98.6 °F (37 °C)  Pulse:  [60-73] 60  Resp:  [16-18] 18  SpO2:  [95 %-98 %] 97 %  BP: (122-168)/(63-88) 168/88     Weight: 135.1 kg (297 lb 13.5 oz)  Body mass index is 46.65 kg/m².    Intake/Output - Last 3 Shifts         09/24 0700 09/25 0659 09/25 0700 09/26 0659 09/26 0700 09/27 0659    P.O. 300 740     IV Piggyback 2550 238.5     Total Intake(mL/kg) 2850 (21.1) 978.5 (7.2)     Urine (mL/kg/hr) 950 1000 (0.3)     Total Output 950 1000     Net +1900 -21.6            Urine Occurrence 2 x 4 x              Physical Exam  Constitutional:       Appearance: Normal appearance.   HENT:      Head: Normocephalic and atraumatic.      Nose: Nose  normal.      Mouth/Throat:      Mouth: Mucous membranes are moist.      Pharynx: Oropharynx is clear.   Eyes:      Extraocular Movements: Extraocular movements intact.      Conjunctiva/sclera: Conjunctivae normal.   Neck:      Comments: Incision c/d/I with surgical dressing overlaying  Cardiovascular:      Rate and Rhythm: Normal rate and regular rhythm.   Pulmonary:      Effort: Pulmonary effort is normal.      Breath sounds: Normal breath sounds.   Abdominal:      General: Abdomen is flat.      Palpations: Abdomen is soft.   Skin:     General: Skin is warm and dry.      Capillary Refill: Capillary refill takes less than 2 seconds.   Neurological:      General: No focal deficit present.      Mental Status: She is alert.          Significant Labs:  I have reviewed all pertinent lab results within the past 24 hours.  CBC:   Recent Labs   Lab 09/26/24  0533   WBC 7.04   RBC 3.89*   HGB 12.6   HCT 37.0      MCV 95   MCH 32.4*   MCHC 34.1     CMP:   Recent Labs   Lab 09/26/24  0533   GLU 99   CALCIUM 8.4*   ALBUMIN 3.1*      K 4.2   CO2 21*   *   BUN 11   CREATININE 0.8       Significant Diagnostics:  I have reviewed all pertinent imaging results/findings within the past 24 hours.

## 2024-09-26 NOTE — CONSULTS
Plastic and Reconstructive Surgery   Consult Note    Date of Consultation:   09/26/2024    Reason for Consultation:  Recurrent laryngeal nerve reconstruction    History of Present Illness:  49-year-old female who presented for parathyroidectomy with Dr. 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  Plastic surgery was consulted for intraoperative reconstruction of the right recurrent laryngeal nerve falling resection of the presumptive parathyroid carcinoma today.    She denies any dysphagia, odynophagia, difficulty breathing, or dysphonia.  There have been no changes in her voice.  She has no previous neck surgeries.    Past Medical History:    has a past medical history of Allergy, Anxiety disorder, unspecified, Asthma, Diverticulitis, Gastro-esophageal reflux disease without esophagitis, Hyperlipidemia, Hypertension, Kidney stones, and Obesity, unspecified.    Past Surgical History:    has a past surgical history that includes Cholecystectomy; Tubal ligation; Elbow surgery (Left, 12/16/2013); Colonoscopy w/ polypectomy (06/17/2024); and Parathyroidectomy (9/24/2024).    Social History:  Social History     Tobacco Use    Smoking status: Never    Smokeless tobacco: Never   Substance Use Topics    Alcohol use: Not Currently     Social History     Substance and Sexual Activity   Drug Use Not Currently       Family History:  Family History   Problem Relation Name Age of Onset    Hyperlipidemia Mother      Heart disease Mother      Diabetes Mother      Arthritis Mother      Cancer Mother          Liver    Cancer Sister      Uterine cancer Sister      Cancer Maternal Grandmother      Uterine cancer Maternal Grandmother      Uterine cancer Other Great Mat. Grandmother         Allergies:  Review of patient's allergies indicates:   Allergen Reactions    Codeine     Penicillins Rash       Home Medications:  Prior to Admission medications    Medication Sig Start Date End Date Taking? Authorizing Provider   albuterol (PROVENTIL/VENTOLIN HFA) 90 mcg/actuation inhaler Inhale 2 puffs into the lungs 2 (two) times daily as needed for Wheezing or Shortness of Breath. 7/17/24  Yes Mellissa Choudhary MD   cinacalcet (SENSIPAR) 30 MG Tab 60 mg in AM and 30 mg in PM  Patient not taking: Reported on 9/20/2024 8/21/24   Vaibhav Joseph DO   dicyclomine (BENTYL) 10 MG capsule Take 30 capsules by mouth 3 (three) times daily.  Patient not taking: Reported on 9/20/2024 6/6/24   Provider, Historical   ibuprofen (ADVIL,MOTRIN) 800 MG tablet TAKE ONE TABLET BY MOUTH THREE TIMES DAILY 8/30/24   Dewayne Salas MD   LINZESS 145 mcg Cap capsule 90 capsules.  Patient not taking: Reported on 9/20/2024 6/6/24   Provider, Historical   lisinopriL 10 MG tablet Take 1 tablet (10 mg total) by mouth once daily.  Patient not taking: Reported on 9/20/2024 7/24/24   Mellissa Choudhary MD   montelukast (SINGULAIR) 10 mg tablet Take 1 tablet (10 mg total) by mouth once daily.  Patient not taking: Reported on 9/20/2024 7/17/24   Mellissa Choudhary MD   ondansetron (ZOFRAN-ODT) 4 MG TbDL 12 tablets.  Patient not taking: Reported on 9/20/2024 6/6/24   Provider, Historical   pantoprazole (PROTONIX) 40 MG tablet Take 40 mg by mouth 2 (two) times daily.  Patient not taking: Reported on 9/20/2024 7/3/24   Provider, Historical   potassium,sodium monobas phos (K-PHOS NO 2) 305-700 mg Tab Take 1 tablet by mouth once daily. for 5 days  Patient not taking: Reported on 9/20/2024 7/23/24 7/29/24  Luis Angel Holbrook MD       Review of Systems:  Negative except for what is noted in HPI    Physical Exam:  VITAL SIGNS:   Vitals:    09/26/24 0330 09/26/24 0334 09/26/24 0445 09/26/24 0514   BP:   129/74    BP Location:   Right arm    Patient  "Position:   Lying    Pulse:  73     Resp: 18   18   Temp:  98.6 °F (37 °C)     TempSrc:       SpO2:  95%     Weight:       Height:         TMAX: Temp (24hrs), Av.3 °F (36.8 °C), Min:97.8 °F (36.6 °C), Max:98.6 °F (37 °C)    General: Alert; No acute distress  Cardiovascular: Regular rate   Respiratory: Normal respiratory effort. Chest rise symmetric.   Abdomen: Soft, nontender, nondistended  Extremity: Moves all extremities equally.  Neurologic: No focal deficit. Speech normal     Midline surgical dressing in place that is clean/dry/intact    Diagnostic Data:  Recent Results (from the past 336 hour(s))   CBC auto differential    Collection Time: 24  5:33 AM   Result Value Ref Range    WBC 7.04 3.90 - 12.70 K/uL    Hemoglobin 12.6 12.0 - 16.0 g/dL    Hematocrit 37.0 37.0 - 48.5 %    Platelets 263 150 - 450 K/uL   CBC auto differential    Collection Time: 24  8:29 AM   Result Value Ref Range    WBC 12.50 3.90 - 12.70 K/uL    Hemoglobin 13.4 12.0 - 16.0 g/dL    Hematocrit 40.1 37.0 - 48.5 %    Platelets 308 150 - 450 K/uL     Recent Results (from the past 336 hour(s))   Basic metabolic panel    Collection Time: 24  5:56 PM   Result Value Ref Range    Sodium 137 136 - 145 mmol/L    Potassium 4.7 3.5 - 5.1 mmol/L    Chloride 111 (H) 95 - 110 mmol/L    CO2 17 (L) 23 - 29 mmol/L    BUN 8 6 - 20 mg/dL    Creatinine 0.9 0.5 - 1.4 mg/dL    Calcium 8.9 8.7 - 10.5 mg/dL    Anion Gap 9 8 - 16 mmol/L     Lab Results   Component Value Date    ALBUMIN 3.1 (L) 2024     No results found for: "CRP"  No results found for: "INR", "PROTIME"  No results found for: "PTT"    Microbiology Results (last 7 days)       ** No results found for the last 168 hours. **            Assessment:  49 y.o.female concerns for parathyroid carcinoma with R RLN invasion    Plan:  Plan for Recurrent laryngeal nerve repair with allograft today in OR    Consent obtained  Discussed with patient and/or family the risks and benefits of " surgical intervention.  Conservative measures have been exhausted and patient would like to proceed with surgery.      We have discussed risks, which include but are not limited to dysphagia, dysphonia, changes in voice, need for emergent airway, blood clots in the legs that can travel to the lungs (pulmonary embolism). Pulmonary embolism can cause shortness of breath, chest pain, and even shock. Other risks include urinary tract infection, nausea and vomiting (usually related to pain medication), chronic pain, bleeding, nerve damage, blood vessel injury, scarring and infection, which can require re-operation. Furthermore, the risks of anesthesia include potential heart, lung, kidney, and liver damage.  Informed consent was obtained.  The patient understands and would like to proceed with surgery.      Yaron Lai MD  Plastic Surgery Fellow

## 2024-09-26 NOTE — ANESTHESIA PROCEDURE NOTES
Arterial    Diagnosis: parathyroid cancer    Patient location during procedure: done in OR  Timeout: 9/26/2024 3:21 PM  Procedure end time: 9/26/2024 3:25 PM    Staffing  Authorizing Provider: Arron Camargo MD  Performing Provider: Katrina Whyte MD    Staffing  Performed by: Katrina Whyte MD  Authorized by: Arron Camargo MD    Anesthesiologist was present at the time of the procedure.    Preanesthetic Checklist  Completed: patient identified, IV checked, site marked, risks and benefits discussed, surgical consent, monitors and equipment checked, pre-op evaluation, timeout performed and anesthesia consent givenArterial  Skin Prep: chlorhexidine gluconate  Local Infiltration: none  Orientation: left  Location: radial    Catheter Size: 20 G  Catheter placement by Ultrasound guidance. Heme positive aspiration all ports.   Vessel Caliber: patent  Needle advanced into vessel with real time Ultrasound guidance.Insertion Attempts: 1  Assessment  Dressing: secured with tape and tegaderm  Patient: Tolerated well

## 2024-09-26 NOTE — PROGRESS NOTES
[facility]  General Surgery  Progress Note    Subjective:     History of Present Illness:  No notes on file    Post-Op Info:  Procedure(s):  PARATHYROIDECTOMY, Partial excision right superior parathyroid tumor   2 Days Post-Op     Interval History:   No acute events overnight. Laryngoscope with ENT yesterday, normal vocal cord function. OR today for resection of remainder of parathyroid tumor, right thyroid lobectomy, right and central level VI lymph node dissection, and right recurrent laryngeal nerve resection w/ nerve grafting (w/ Dr. Raymond).     Medications:  Continuous Infusions:   lactated ringers   Intravenous Continuous 125 mL/hr at 09/26/24 0000 New Bag at 09/26/24 0000     Scheduled Meds:   acetaminophen  650 mg Oral Q4H    gabapentin  300 mg Oral TID    LIDOcaine  1 patch Transdermal Q24H    methocarbamoL  750 mg Oral QID    polyethylene glycol  17 g Oral Daily     PRN Meds:  Current Facility-Administered Medications:     albuterol, 2 puff, Inhalation, BID PRN    ondansetron, 4 mg, Intravenous, Q6H PRN    oxyCODONE, 5 mg, Oral, Q4H PRN    prochlorperazine, 2.5 mg, Intravenous, Q6H PRN    sodium chloride 0.9%, 10 mL, Intravenous, PRN     Review of patient's allergies indicates:   Allergen Reactions    Codeine     Penicillins Rash     Objective:     Vital Signs (Most Recent):  Temp: 98.6 °F (37 °C) (09/26/24 0821)  Pulse: 60 (09/26/24 0821)  Resp: 18 (09/26/24 0821)  BP: (!) 168/88 (09/26/24 0821)  SpO2: 97 % (09/26/24 0821) Vital Signs (24h Range):  Temp:  [97.8 °F (36.6 °C)-98.6 °F (37 °C)] 98.6 °F (37 °C)  Pulse:  [60-73] 60  Resp:  [16-18] 18  SpO2:  [95 %-98 %] 97 %  BP: (122-168)/(63-88) 168/88     Weight: 135.1 kg (297 lb 13.5 oz)  Body mass index is 46.65 kg/m².    Intake/Output - Last 3 Shifts         09/24 0700 09/25 0659 09/25 0700 09/26 0659 09/26 0700 09/27 0659    P.O. 300 740     IV Piggyback 2550 238.5     Total Intake(mL/kg) 2850 (21.1) 978.5 (7.2)     Urine (mL/kg/hr) 950 1000  (0.3)     Total Output 950 1000     Net +1900 -21.6            Urine Occurrence 2 x 4 x              Physical Exam  Constitutional:       Appearance: Normal appearance.   HENT:      Head: Normocephalic and atraumatic.      Nose: Nose normal.      Mouth/Throat:      Mouth: Mucous membranes are moist.      Pharynx: Oropharynx is clear.   Eyes:      Extraocular Movements: Extraocular movements intact.      Conjunctiva/sclera: Conjunctivae normal.   Neck:      Comments: Incision c/d/I with surgical dressing overlaying  Cardiovascular:      Rate and Rhythm: Normal rate and regular rhythm.   Pulmonary:      Effort: Pulmonary effort is normal.      Breath sounds: Normal breath sounds.   Abdominal:      General: Abdomen is flat.      Palpations: Abdomen is soft.   Skin:     General: Skin is warm and dry.      Capillary Refill: Capillary refill takes less than 2 seconds.   Neurological:      General: No focal deficit present.      Mental Status: She is alert.          Significant Labs:  I have reviewed all pertinent lab results within the past 24 hours.  CBC:   Recent Labs   Lab 09/26/24  0533   WBC 7.04   RBC 3.89*   HGB 12.6   HCT 37.0      MCV 95   MCH 32.4*   MCHC 34.1     CMP:   Recent Labs   Lab 09/26/24  0533   GLU 99   CALCIUM 8.4*   ALBUMIN 3.1*      K 4.2   CO2 21*   *   BUN 11   CREATININE 0.8       Significant Diagnostics:  I have reviewed all pertinent imaging results/findings within the past 24 hours.  Assessment/Plan:     Primary hyperparathyroidism  49F s/p partial resection of a right superior parathyroid tumor. Will continue work up in patient with sestamibi scan today. ENT on board, laryngoscope yesterday w/ normal vocal cord function bilaterally. OR today.     -- OR today   -- informed consent obtained and placed in chart   -- NPO since midnight  -- mIVF since midnight  -- MM pain control (tylenol, robaxin, gabapentin, oxy5)  -- bowel regimen   -- home meds restarted as appropriate  --  daily CBC, RFP     Dispo: continue inpatient care; will return to the OR today       Jania Hood MD  General Surgery  [facility]

## 2024-09-26 NOTE — ANESTHESIA PROCEDURE NOTES
Intubation    Date/Time: 9/26/2024 3:07 PM    Performed by: Katrina Whyte MD  Authorized by: Arron Camargo MD    Intubation:     Induction:  Rapid sequence induction    Intubated:  Postinduction    Mask Ventilation:  Not attempted    Attempts:  1    Attempted By:  Resident anesthesiologist    Method of Intubation:  Video laryngoscopy    Blade:  García 3    Laryngeal View Grade: Grade I - full view of cords      Difficult Airway Encountered?: No      Complications:  None    Airway Device:  EMG ETT (NIMS)    Airway Device Size:  7.0    Style/Cuff Inflation:  Cuffed    Inflation Amount (mL):  7    Tube secured:  22    Secured at:  The lips    Placement Verified By:  Capnometry    Complicating Factors:  Obesity    Findings Post-Intubation:  BS equal bilateral and atraumatic/condition of teeth unchanged  Notes:      Ramp utilized on induction

## 2024-09-26 NOTE — PLAN OF CARE
Problem: Adult Inpatient Plan of Care  Goal: Plan of Care Review  Outcome: Progressing  Goal: Patient-Specific Goal (Individualized)  Outcome: Progressing  Goal: Absence of Hospital-Acquired Illness or Injury  Outcome: Progressing  Goal: Optimal Comfort and Wellbeing  Outcome: Progressing  Goal: Readiness for Transition of Care  Outcome: Progressing     Problem: Bariatric Environmental Safety  Goal: Safety Maintained with Care  Outcome: Progressing     Problem: Infection  Goal: Absence of Infection Signs and Symptoms  Outcome: Progressing     Problem: Wound  Goal: Optimal Coping  Outcome: Progressing  Goal: Optimal Functional Ability  Outcome: Progressing  Goal: Absence of Infection Signs and Symptoms  Outcome: Progressing  Goal: Improved Oral Intake  Outcome: Progressing  Goal: Optimal Pain Control and Function  Outcome: Progressing  Goal: Skin Health and Integrity  Outcome: Progressing  Goal: Optimal Wound Healing  Outcome: Progressing     Problem: Pain Acute  Goal: Optimal Pain Control and Function  Outcome: Progressing     Problem: Fall Injury Risk  Goal: Absence of Fall and Fall-Related Injury  Outcome: Progressing

## 2024-09-27 VITALS
SYSTOLIC BLOOD PRESSURE: 129 MMHG | BODY MASS INDEX: 45.99 KG/M2 | HEIGHT: 67 IN | WEIGHT: 293 LBS | TEMPERATURE: 98 F | HEART RATE: 61 BPM | DIASTOLIC BLOOD PRESSURE: 79 MMHG | OXYGEN SATURATION: 99 % | RESPIRATION RATE: 16 BRPM

## 2024-09-27 LAB
ALBUMIN SERPL BCP-MCNC: 3.1 G/DL (ref 3.5–5.2)
ALBUMIN SERPL BCP-MCNC: 3.2 G/DL (ref 3.5–5.2)
ANION GAP SERPL CALC-SCNC: 7 MMOL/L (ref 8–16)
ANION GAP SERPL CALC-SCNC: 7 MMOL/L (ref 8–16)
BASOPHILS # BLD AUTO: 0.02 K/UL (ref 0–0.2)
BASOPHILS NFR BLD: 0.2 % (ref 0–1.9)
BUN SERPL-MCNC: 12 MG/DL (ref 6–20)
BUN SERPL-MCNC: 13 MG/DL (ref 6–20)
CALCIUM SERPL-MCNC: 7.5 MG/DL (ref 8.7–10.5)
CALCIUM SERPL-MCNC: 7.8 MG/DL (ref 8.7–10.5)
CHLORIDE SERPL-SCNC: 110 MMOL/L (ref 95–110)
CHLORIDE SERPL-SCNC: 111 MMOL/L (ref 95–110)
CO2 SERPL-SCNC: 20 MMOL/L (ref 23–29)
CO2 SERPL-SCNC: 22 MMOL/L (ref 23–29)
CREAT SERPL-MCNC: 0.8 MG/DL (ref 0.5–1.4)
CREAT SERPL-MCNC: 0.8 MG/DL (ref 0.5–1.4)
DIFFERENTIAL METHOD BLD: ABNORMAL
EOSINOPHIL # BLD AUTO: 0 K/UL (ref 0–0.5)
EOSINOPHIL NFR BLD: 0 % (ref 0–8)
ERYTHROCYTE [DISTWIDTH] IN BLOOD BY AUTOMATED COUNT: 12.4 % (ref 11.5–14.5)
EST. GFR  (NO RACE VARIABLE): >60 ML/MIN/1.73 M^2
EST. GFR  (NO RACE VARIABLE): >60 ML/MIN/1.73 M^2
GLUCOSE SERPL-MCNC: 109 MG/DL (ref 70–110)
GLUCOSE SERPL-MCNC: 98 MG/DL (ref 70–110)
HCT VFR BLD AUTO: 36 % (ref 37–48.5)
HGB BLD-MCNC: 12.4 G/DL (ref 12–16)
IMM GRANULOCYTES # BLD AUTO: 0.04 K/UL (ref 0–0.04)
IMM GRANULOCYTES NFR BLD AUTO: 0.3 % (ref 0–0.5)
LYMPHOCYTES # BLD AUTO: 1.2 K/UL (ref 1–4.8)
LYMPHOCYTES NFR BLD: 10 % (ref 18–48)
MAGNESIUM SERPL-MCNC: 2 MG/DL (ref 1.6–2.6)
MCH RBC QN AUTO: 32.8 PG (ref 27–31)
MCHC RBC AUTO-ENTMCNC: 34.4 G/DL (ref 32–36)
MCV RBC AUTO: 95 FL (ref 82–98)
MONOCYTES # BLD AUTO: 0.8 K/UL (ref 0.3–1)
MONOCYTES NFR BLD: 6.8 % (ref 4–15)
NEUTROPHILS # BLD AUTO: 9.8 K/UL (ref 1.8–7.7)
NEUTROPHILS NFR BLD: 82.7 % (ref 38–73)
NRBC BLD-RTO: 0 /100 WBC
PHOSPHATE SERPL-MCNC: 2.1 MG/DL (ref 2.7–4.5)
PHOSPHATE SERPL-MCNC: 2.4 MG/DL (ref 2.7–4.5)
PLATELET # BLD AUTO: 267 K/UL (ref 150–450)
PMV BLD AUTO: 9.4 FL (ref 9.2–12.9)
POTASSIUM SERPL-SCNC: 4.1 MMOL/L (ref 3.5–5.1)
POTASSIUM SERPL-SCNC: 4.4 MMOL/L (ref 3.5–5.1)
RBC # BLD AUTO: 3.78 M/UL (ref 4–5.4)
SODIUM SERPL-SCNC: 138 MMOL/L (ref 136–145)
SODIUM SERPL-SCNC: 139 MMOL/L (ref 136–145)
WBC # BLD AUTO: 11.88 K/UL (ref 3.9–12.7)

## 2024-09-27 PROCEDURE — 25000003 PHARM REV CODE 250

## 2024-09-27 PROCEDURE — 63600175 PHARM REV CODE 636 W HCPCS

## 2024-09-27 PROCEDURE — 85025 COMPLETE CBC W/AUTO DIFF WBC: CPT

## 2024-09-27 PROCEDURE — 25000242 PHARM REV CODE 250 ALT 637 W/ HCPCS

## 2024-09-27 PROCEDURE — 36415 COLL VENOUS BLD VENIPUNCTURE: CPT

## 2024-09-27 PROCEDURE — 83735 ASSAY OF MAGNESIUM: CPT

## 2024-09-27 PROCEDURE — 80069 RENAL FUNCTION PANEL: CPT | Mod: 91

## 2024-09-27 PROCEDURE — 25000003 PHARM REV CODE 250: Mod: JZ,JG

## 2024-09-27 PROCEDURE — 27201037 HC PRESSURE MONITORING SET UP

## 2024-09-27 RX ORDER — POLYETHYLENE GLYCOL 3350 17 G/17G
17 POWDER, FOR SOLUTION ORAL DAILY
COMMUNITY
Start: 2024-09-28

## 2024-09-27 RX ORDER — HYDROMORPHONE HYDROCHLORIDE 1 MG/ML
0.5 INJECTION, SOLUTION INTRAMUSCULAR; INTRAVENOUS; SUBCUTANEOUS ONCE
Status: COMPLETED | OUTPATIENT
Start: 2024-09-27 | End: 2024-09-27

## 2024-09-27 RX ORDER — HYDROMORPHONE HYDROCHLORIDE 1 MG/ML
0.5 INJECTION, SOLUTION INTRAMUSCULAR; INTRAVENOUS; SUBCUTANEOUS EVERY 6 HOURS PRN
Status: DISCONTINUED | OUTPATIENT
Start: 2024-09-27 | End: 2024-09-27

## 2024-09-27 RX ORDER — ACETAMINOPHEN 160 MG/5ML
650 LIQUID ORAL EVERY 4 HOURS PRN
Qty: 473 ML | Refills: 0 | Status: SHIPPED | OUTPATIENT
Start: 2024-09-27

## 2024-09-27 RX ORDER — TRAMADOL HYDROCHLORIDE 50 MG/1
50 TABLET ORAL EVERY 6 HOURS PRN
Qty: 28 TABLET | Refills: 0 | Status: SHIPPED | OUTPATIENT
Start: 2024-09-27

## 2024-09-27 RX ORDER — PROMETHAZINE HYDROCHLORIDE 6.25 MG/5ML
12.5 SYRUP ORAL EVERY 6 HOURS PRN
Status: DISCONTINUED | OUTPATIENT
Start: 2024-09-27 | End: 2024-09-27 | Stop reason: HOSPADM

## 2024-09-27 RX ORDER — CALCIUM GLUCONATE 20 MG/ML
1 INJECTION, SOLUTION INTRAVENOUS ONCE
Status: COMPLETED | OUTPATIENT
Start: 2024-09-27 | End: 2024-09-27

## 2024-09-27 RX ORDER — LIDOCAINE HYDROCHLORIDE 40 MG/ML
5 INJECTION, SOLUTION RETROBULBAR ONCE
Status: DISCONTINUED | OUTPATIENT
Start: 2024-09-27 | End: 2024-09-27

## 2024-09-27 RX ORDER — ACETAMINOPHEN 10 MG/ML
1000 INJECTION, SOLUTION INTRAVENOUS EVERY 8 HOURS
Status: DISCONTINUED | OUTPATIENT
Start: 2024-09-27 | End: 2024-09-27

## 2024-09-27 RX ORDER — LIDOCAINE HYDROCHLORIDE 40 MG/ML
2 INJECTION, SOLUTION RETROBULBAR ONCE
Status: DISCONTINUED | OUTPATIENT
Start: 2024-09-27 | End: 2024-09-27

## 2024-09-27 RX ORDER — CALCIUM CARBONATE 400(1000)
2 TABLET,CHEWABLE ORAL 2 TIMES DAILY WITH MEALS
COMMUNITY
Start: 2024-09-27 | End: 2025-09-27

## 2024-09-27 RX ORDER — GABAPENTIN 300 MG/1
300 CAPSULE ORAL 3 TIMES DAILY
Status: DISCONTINUED | OUTPATIENT
Start: 2024-09-27 | End: 2024-09-27

## 2024-09-27 RX ORDER — TRAMADOL HYDROCHLORIDE 50 MG/1
50 TABLET ORAL EVERY 6 HOURS PRN
Status: DISCONTINUED | OUTPATIENT
Start: 2024-09-27 | End: 2024-09-27 | Stop reason: HOSPADM

## 2024-09-27 RX ORDER — ACETAMINOPHEN 10 MG/ML
1000 INJECTION, SOLUTION INTRAVENOUS EVERY 8 HOURS
Status: DISCONTINUED | OUTPATIENT
Start: 2024-09-27 | End: 2024-09-27 | Stop reason: HOSPADM

## 2024-09-27 RX ORDER — METHOCARBAMOL 500 MG/1
1000 TABLET, FILM COATED ORAL 4 TIMES DAILY
Status: DISCONTINUED | OUTPATIENT
Start: 2024-09-27 | End: 2024-09-27 | Stop reason: HOSPADM

## 2024-09-27 RX ORDER — GABAPENTIN 250 MG/5ML
250 SOLUTION ORAL 3 TIMES DAILY
Status: DISCONTINUED | OUTPATIENT
Start: 2024-09-27 | End: 2024-09-27 | Stop reason: HOSPADM

## 2024-09-27 RX ORDER — METHOCARBAMOL 500 MG/1
1000 TABLET, FILM COATED ORAL 4 TIMES DAILY
Qty: 80 TABLET | Refills: 0 | Status: SHIPPED | OUTPATIENT
Start: 2024-09-27 | End: 2024-10-07

## 2024-09-27 RX ORDER — ONDANSETRON HYDROCHLORIDE 4 MG/5ML
4 SOLUTION ORAL EVERY 6 HOURS PRN
Status: DISCONTINUED | OUTPATIENT
Start: 2024-09-27 | End: 2024-09-27 | Stop reason: HOSPADM

## 2024-09-27 RX ORDER — ONDANSETRON 8 MG/1
8 TABLET, ORALLY DISINTEGRATING ORAL 2 TIMES DAILY
Qty: 60 TABLET | Refills: 0 | Status: SHIPPED | OUTPATIENT
Start: 2024-09-27 | End: 2024-10-27

## 2024-09-27 RX ORDER — GABAPENTIN 250 MG/5ML
250 SOLUTION ORAL 3 TIMES DAILY
Qty: 210 ML | Refills: 0 | Status: SHIPPED | OUTPATIENT
Start: 2024-09-27

## 2024-09-27 RX ADMIN — SODIUM CHLORIDE, SODIUM LACTATE, POTASSIUM CHLORIDE, AND CALCIUM CHLORIDE: 600; 310; 30; 20 INJECTION, SOLUTION INTRAVENOUS at 10:09

## 2024-09-27 RX ADMIN — OXYCODONE HYDROCHLORIDE 5 MG: 5 SOLUTION ORAL at 01:09

## 2024-09-27 RX ADMIN — ACETAMINOPHEN 1000 MG: 10 INJECTION, SOLUTION INTRAVENOUS at 11:09

## 2024-09-27 RX ADMIN — METHOCARBAMOL 1000 MG: 500 TABLET ORAL at 01:09

## 2024-09-27 RX ADMIN — CALCIUM GLUCONATE 1 G: 20 INJECTION, SOLUTION INTRAVENOUS at 12:09

## 2024-09-27 RX ADMIN — METHOCARBAMOL 500 MG: 100 INJECTION INTRAMUSCULAR; INTRAVENOUS at 05:09

## 2024-09-27 RX ADMIN — ACETAMINOPHEN 650 MG: 650 SOLUTION ORAL at 05:09

## 2024-09-27 RX ADMIN — OXYCODONE HYDROCHLORIDE 5 MG: 5 SOLUTION ORAL at 05:09

## 2024-09-27 RX ADMIN — HYDROMORPHONE HYDROCHLORIDE 0.5 MG: 1 INJECTION, SOLUTION INTRAMUSCULAR; INTRAVENOUS; SUBCUTANEOUS at 06:09

## 2024-09-27 RX ADMIN — METHOCARBAMOL 500 MG: 100 INJECTION INTRAMUSCULAR; INTRAVENOUS at 01:09

## 2024-09-27 RX ADMIN — HYDROMORPHONE HYDROCHLORIDE 0.5 MG: 1 INJECTION, SOLUTION INTRAMUSCULAR; INTRAVENOUS; SUBCUTANEOUS at 10:09

## 2024-09-27 RX ADMIN — ONDANSETRON 4 MG: 2 INJECTION INTRAMUSCULAR; INTRAVENOUS at 08:09

## 2024-09-27 RX ADMIN — ACETAMINOPHEN 650 MG: 650 SOLUTION ORAL at 12:09

## 2024-09-27 NOTE — PLAN OF CARE
Patient discharged to home. Discharge instructions verbally given and hard copy provided to patient and spouse. Prescription delivered to bedside. Removed 18 g IV with cath tip in place. Patient tolerated IV removal well with bleeding controlled. Patient remains free of falls with no acute pain or distress noted. Patient left floor via transport. Patient and spouse provided with ESTEBAN drain education and supplies.

## 2024-09-27 NOTE — PROGRESS NOTES
.Plastic and Reconstructive Surgery   Progress Note    Subjective:    Having expected post op pain. No airy voice or respiratory distress or dysphagia reported    Objective:  Vital signs in last 24 hours:  Temp:  [98.1 °F (36.7 °C)-98.6 °F (37 °C)] 98.1 °F (36.7 °C)  Pulse:  [60-83] 64  Resp:  [13-22] 18  SpO2:  [94 %-100 %] 98 %  BP: (124-168)/(60-88) 157/87    Intake/Output last 3 shifts:  I/O last 3 completed shifts:  In: 2128.5 [P.O.:740; IV Piggyback:1388.5]  Out: 1960 [Urine:1960]    Intake/Output this shift:  I/O this shift:  In: 1672.3 [NG/GT:60; IV Piggyback:1612.3]  Out: 1065 [Urine:1040; Drains:25]        Physical Exam:  VITAL SIGNS:   Vitals:    24 0146 24 0427 24 0526 24 0617   BP:  (!) 157/87     BP Location:  Left arm     Patient Position:  Lying     Pulse:  64     Resp: 18 18 (!) 22 18   Temp:  98.1 °F (36.7 °C)     TempSrc:  Oral     SpO2:  98%     Weight:       Height:         TMAX: Temp (24hrs), Av.4 °F (36.9 °C), Min:98.1 °F (36.7 °C), Max:98.6 °F (37 °C)      General: Alert; No acute distress  Cardiovascular: Regular rate   Respiratory: Normal respiratory effort. Chest rise symmetric.   Abdomen: Soft, nontender, nondistended  Extremity: Moves all extremities equally.  Neurologic: No focal deficit. Speech normal  Neck incision c/d/i      Scheduled Medications acetaminophen, 1,000 mg, Q8H  [COMPLETED] HYDROmorphone, 0.5 mg, Once  methocarbamol (ROBAXIN) IVPB, 1,000 mg, Q6H  polyethylene glycol, 17 g, Daily        PRN Medications     Current Facility-Administered Medications:     albuterol, 2 puff, Inhalation, BID PRN    ondansetron, 4 mg, Intravenous, Q6H PRN    oxyCODONE, 5 mg, Oral, Q4H PRN    prochlorperazine, 2.5 mg, Intravenous, Q6H PRN    sodium chloride 0.9%, 10 mL, Intravenous, PRN    Recent Labs:   Lab Results   Component Value Date    WBC 7.04 2024    HGB 12.6 2024    HCT 37.0 2024    MCV 95 2024     2024     Lab Results    Component Value Date     (H) 09/26/2024     09/26/2024    K 4.5 09/26/2024     (H) 09/26/2024    BUN 12 09/26/2024         Assessment:   49 y.o. y/o female s/p Procedure(s):  PARATHYROID CARCINOMA RESECTION WITH RIGHT THYROID LOBECTOMY AND RIGHT LEVEL VI COMPARTMENT DISSECTION  ROTATIONAL STERNOTHYROID MUSCLE FLAP  REPAIR, NERVE USING ALLOGRAFT      1 Day Post-Op         Plan  - no resp distress. No airy voice to suggest SLN injury  - monitor neck incision for hematoma  - Diet: per primary  - Abx: per primary  - DVT ppx, IS, OOB, ambulate, PT/OT      Patient was discussed with Attending Plastic Surgeon, Dr. Segundo Holguin MD- Fellow  Department of Plastic and Reconstructive Surgery

## 2024-09-27 NOTE — PROGRESS NOTES
[facility]  General Surgery  Progress Note    Subjective:     History of Present Illness:  No notes on file    Post-Op Info:  Procedure(s) (LRB):  PARATHYROID CARCINOMA RESECTION WITH RIGHT THYROID LOBECTOMY AND RIGHT LEVEL VI COMPARTMENT DISSECTION (Right)  ROTATIONAL STERNOTHYROID MUSCLE FLAP (Right)  REPAIR, NERVE USING ALLOGRAFT (Right)   1 Day Post-Op     Interval History:   OR yesterday for parathyroid tumor, right recurrent laryngeal nerve resection, nerve repair with allografting, level VI dissection, and rotational muscle flap. Did well overnight. Some pain, expected. ESTEBAN drain with small amount of SS fluid in bulb. NGT in place. Swallowing okay. Phonating well.     Medications:  Continuous Infusions:   lactated ringers   Intravenous Continuous 125 mL/hr at 09/26/24 0000 New Bag at 09/26/24 0000     Scheduled Meds:   acetaminophen  1,000 mg Intravenous Q8H    methocarbamol (ROBAXIN) IVPB  1,000 mg Intravenous Q6H    polyethylene glycol  17 g Oral Daily     PRN Meds:  Current Facility-Administered Medications:     albuterol, 2 puff, Inhalation, BID PRN    ondansetron, 4 mg, Intravenous, Q6H PRN    oxyCODONE, 5 mg, Oral, Q4H PRN    prochlorperazine, 2.5 mg, Intravenous, Q6H PRN    sodium chloride 0.9%, 10 mL, Intravenous, PRN     Review of patient's allergies indicates:   Allergen Reactions    Codeine     Penicillins Rash     Objective:     Vital Signs (Most Recent):  Temp: 98.1 °F (36.7 °C) (09/27/24 0427)  Pulse: 64 (09/27/24 0427)  Resp: 18 (09/27/24 0617)  BP: (!) 157/87 (09/27/24 0427)  SpO2: 98 % (09/27/24 0427) Vital Signs (24h Range):  Temp:  [98.1 °F (36.7 °C)-98.6 °F (37 °C)] 98.1 °F (36.7 °C)  Pulse:  [60-83] 64  Resp:  [13-22] 18  SpO2:  [94 %-100 %] 98 %  BP: (124-168)/(60-88) 157/87     Weight: 135.1 kg (297 lb 13.5 oz)  Body mass index is 46.65 kg/m².    Intake/Output - Last 3 Shifts         09/25 0700 09/26 0659 09/26 0700 09/27 0659    P.O. 740 0    NG/GT  60    IV Piggyback 238.5  2762.3    Total Intake(mL/kg) 978.5 (7.2) 2822.3 (20.9)    Urine (mL/kg/hr) 1000 (0.3) 2000 (0.6)    Drains  25    Stool  0    Total Output 1000 2025    Net -21.6 +797.3          Urine Occurrence 4 x     Stool Occurrence  0 x             Physical Exam  Constitutional:       Appearance: Normal appearance.   HENT:      Head: Normocephalic and atraumatic.      Nose: Nose normal.      Mouth/Throat:      Mouth: Mucous membranes are moist.      Pharynx: Oropharynx is clear.   Eyes:      Extraocular Movements: Extraocular movements intact.      Conjunctiva/sclera: Conjunctivae normal.   Neck:        Comments: Incision c/d/I with dermabond overlaying  ESTEBAN drain to right neck, SS  Cardiovascular:      Rate and Rhythm: Normal rate and regular rhythm.   Pulmonary:      Effort: Pulmonary effort is normal.      Breath sounds: Normal breath sounds.   Abdominal:      General: Abdomen is flat.      Palpations: Abdomen is soft.   Skin:     General: Skin is warm and dry.      Capillary Refill: Capillary refill takes less than 2 seconds.   Neurological:      General: No focal deficit present.      Mental Status: She is alert.          Significant Labs:  I have reviewed all pertinent lab results within the past 24 hours.  CBC:   Recent Labs   Lab 09/26/24  0533   WBC 7.04   RBC 3.89*   HGB 12.6   HCT 37.0      MCV 95   MCH 32.4*   MCHC 34.1     CMP:   Recent Labs   Lab 09/26/24  2206   *   CALCIUM 7.4*   ALBUMIN 3.2*      K 4.5   CO2 18*   *   BUN 12   CREATININE 1.0       Significant Diagnostics:  I have reviewed all pertinent imaging results/findings within the past 24 hours.  Assessment/Plan:     Primary hyperparathyroidism  49F s/p partial resection of a right superior parathyroid tumor. Will continue work up in patient with sestamibi scan today. ENT on board, laryngoscope yesterday w/ normal vocal cord function bilaterally. Now s/p parathyroid tumor, right recurrent laryngeal nerve resection, nerve repair  with allografting, level VI dissection, and rotational muscle flap.     -- OR yesterday, doing well  -- NGT in place   -- strict NPO, mIVF  -- MM pain control (scheduled IV tylenol, IV robaxin; prn oxy5 and dilaudid)  -- okay for meds per NGT if needed  -- ENT on board, plan for scope today if able  -- possible speech eval, will coordinate with ENT   -- bowel regimen   -- home meds restarted as appropriate  -- daily CBC, RFP   -- may need some calcium replacement, will follow up RFP this am          Jania Hood MD  General Surgery  [facility]

## 2024-09-27 NOTE — TRANSFER OF CARE
"Anesthesia Transfer of Care Note    Patient: Erika Jenkins    Procedure(s) Performed: Procedure(s) (LRB):  PARATHYROID CARCINOMA RESECTION WITH RIGHT THYROID LOBECTOMY AND RIGHT LEVEL VI COMPARTMENT DISSECTION (Right)  ROTATIONAL STERNOTHYROID MUSCLE FLAP (Right)  REPAIR, NERVE USING ALLOGRAFT (Right)    Patient location: PACU    Anesthesia Type: general    Transport from OR: Transported from OR on 6-10 L/min O2 by face mask with adequate spontaneous ventilation    Post pain: adequate analgesia    Post assessment: no apparent anesthetic complications and tolerated procedure well    Post vital signs: stable    Level of consciousness: sedated and responds to stimulation    Nausea/Vomiting: no nausea/vomiting    Complications: none    Transfer of care protocol was followed    Last vitals: Visit Vitals  BP (!) 143/86 (BP Location: Right arm, Patient Position: Lying)   Pulse 68   Temp 37 °C (98.6 °F) (Temporal)   Resp 18   Ht 5' 7" (1.702 m)   Wt 135.1 kg (297 lb 13.5 oz)   SpO2 99%   Breastfeeding No   BMI 46.65 kg/m²     "

## 2024-09-27 NOTE — BRIEF OP NOTE
[facility] - Surgery ([facility])  Brief Operative Note    SUMMARY     Surgery Date: 9/26/2024     Surgeons and Role:  Panel 1:     * Zonia Quesada MD - Primary     * Jania Hood MD - Resident - Assisting     * Abiodun Mckeon MD - Resident - Assisting  Panel 2:     * Carlos Raymond DO - Primary        Pre-op Diagnosis:  Parathyroid carcinoma [C75.0]  Hypercalcemia [E83.52]    Post-op Diagnosis:  Post-Op Diagnosis Codes:     * Parathyroid carcinoma [C75.0]     * Hypercalcemia [E83.52]    Procedure(s) (LRB):  PARATHYROID CARCINOMA RESECTION WITH RIGHT THYROID LOBECTOMY AND RIGHT LEVEL VI COMPARTMENT DISSECTION (Right)  ROTATIONAL STERNOTHYROID MUSCLE FLAP (Right)  REPAIR, NERVE USING ALLOGRAFT (Right)    Anesthesia: General    Implants:  Implant Name Type Inv. Item Serial No.  Lot No. LRB No. Used Action   GRAFT AVANCE NERVE [id number] - [id number]  GRAFT AVANCE NERVE [id number]  [id number] 1 Implanted       Operative Findings: Radical resection of right parathyroid carcinoma with right thyroid lobectomy. RLN taken as tumor encompassed nerve. Nerve graft and muscle flap with plastics. See op note for details.     Estimated Blood Loss: 10ml  Estimated Blood Loss has been documented.         Specimens:   Specimen (24h ago, onward)       Start     Ordered    09/26/24 2123  Specimen to Pathology, Surgery Thyroid, Parathyroid, and Adrenals  Once        Comments: Pre-op Diagnosis: Parathyroid carcinoma [C75.0]Hypercalcemia [E83.52]Procedure(s):PARATHYROID CARCINOMA RESECTION WITH RIGHT THYROID LOBECTOMYCREATION, FLAP, MUSCLE ROTATION Number of specimens: 5Name of specimens: 1.) Distal Right Recurrent Laryngeal Nerve Margin, Frozen.2.) Proximal Right Recurrent Laryngeal Nerve Margin, Frozen.3.) Parathyroid Tumor; Stitches: Short - Distal Nerve Margin, Long - Proximal Nerve Margin, Double - Medial, partial tumor excision site, Loop - Esophageal Margin, Permanent.4.) Right Thyroid Lobe and  Isthmus; Short Stitch - Superior Pole, Long Stitch - Isthmus. Permanent5.) Right Level VI Lymph Node Compartment, Permanent.     References:    Click here for ordering Quick Tip   Question Answer Comment   Procedure Type: Thyroid, Parathyroid, and Adrenals    Specimen Class: Known or suspected malignancy    Release to patient Immediate        09/26/24 0486                    [id number]

## 2024-09-27 NOTE — NURSING
"[facility] Plan of Care Note    Dx:   Primary hyperparathyroidism [E21.0]  Hyperparathyroidism [E21.3]    Shift Events: Pt experiencing frustration regarding pain medication regimen ordered by physician. Hostile mood and crying present. Offer ordered PRN medication as ordered by physician. Little to no success. Pt remains upset. States, " Dilaudid has helped me." Managing pain has been a key priority since admission. Contacted oncall physician x 2 nights for additional orders for pain medication. See new orders. Pt assessed pt at bedside. See flowsheet for MD names. Pt education provided using calm cooperative approach. Pt verbalizes understanding. Charge nurse aware and at bedside tonight. Will continue to observe and current POC.    Goals of Care: VS WNL, Labs WNL, pain management, infection control, safety, NG maintenance, KATE drain care.    Neuro: AOX 3    Vital Signs: BP (!) 157/87 (BP Location: Left arm, Patient Position: Lying)   Pulse 64   Temp 98.1 °F (36.7 °C) (Oral)   Resp (!) 22   Ht 5' 7" (1.702 m)   Wt 135.1 kg (297 lb 13.5 oz)   SpO2 98%   Breastfeeding No   BMI 46.65 kg/m²     Respiratory: respirations even and non-labored    Diet: Diet NPO Except for: Medication      Is patient tolerating current diet? yes    GTTS:  ml/hr    Urine Output/Bowel Movement:   I/O this shift:  In: 1672.3 [NG/GT:60; IV Piggyback:1612.3]  Out: 1065 [Urine:1040; Drains:25]  Last Bowel Movement: 09/23/24      Drains/Tubes/Tube Feeds (include total output/shift):   I/O this shift:  In: 1672.3 [NG/GT:60; IV Piggyback:1612.3]  Out: 1065 [Urine:1040; Drains:25]      Lines:   NG tube clamped  1 neck kate  20 G rfa  18 g lfa    Accuchecks: n/a    Skin: see LDA    Fall Risk Score: 3    Activity level? X 1 ASSIST    Any scheduled procedures? no    Any safety concerns? aspiration      "

## 2024-09-27 NOTE — SUBJECTIVE & OBJECTIVE
Interval History:   OR yesterday for parathyroid tumor, right recurrent laryngeal nerve resection, nerve repair with allografting, level VI dissection, and rotational muscle flap. Did well overnight. Some pain, expected. ESTEBAN drain with small amount of SS fluid in bulb. NGT in place. Swallowing okay. Phonating well.     Medications:  Continuous Infusions:   lactated ringers   Intravenous Continuous 125 mL/hr at 09/26/24 0000 New Bag at 09/26/24 0000     Scheduled Meds:   acetaminophen  1,000 mg Intravenous Q8H    methocarbamol (ROBAXIN) IVPB  1,000 mg Intravenous Q6H    polyethylene glycol  17 g Oral Daily     PRN Meds:  Current Facility-Administered Medications:     albuterol, 2 puff, Inhalation, BID PRN    ondansetron, 4 mg, Intravenous, Q6H PRN    oxyCODONE, 5 mg, Oral, Q4H PRN    prochlorperazine, 2.5 mg, Intravenous, Q6H PRN    sodium chloride 0.9%, 10 mL, Intravenous, PRN     Review of patient's allergies indicates:   Allergen Reactions    Codeine     Penicillins Rash     Objective:     Vital Signs (Most Recent):  Temp: 98.1 °F (36.7 °C) (09/27/24 0427)  Pulse: 64 (09/27/24 0427)  Resp: 18 (09/27/24 0617)  BP: (!) 157/87 (09/27/24 0427)  SpO2: 98 % (09/27/24 0427) Vital Signs (24h Range):  Temp:  [98.1 °F (36.7 °C)-98.6 °F (37 °C)] 98.1 °F (36.7 °C)  Pulse:  [60-83] 64  Resp:  [13-22] 18  SpO2:  [94 %-100 %] 98 %  BP: (124-168)/(60-88) 157/87     Weight: 135.1 kg (297 lb 13.5 oz)  Body mass index is 46.65 kg/m².    Intake/Output - Last 3 Shifts         09/25 0700 09/26 0659 09/26 0700 09/27 0659    P.O. 740 0    NG/GT  60    IV Piggyback 238.5 2762.3    Total Intake(mL/kg) 978.5 (7.2) 2822.3 (20.9)    Urine (mL/kg/hr) 1000 (0.3) 2000 (0.6)    Drains  25    Stool  0    Total Output 1000 2025    Net -21.6 +797.3          Urine Occurrence 4 x     Stool Occurrence  0 x             Physical Exam  Constitutional:       Appearance: Normal appearance.   HENT:      Head: Normocephalic and atraumatic.      Nose: Nose  normal.      Mouth/Throat:      Mouth: Mucous membranes are moist.      Pharynx: Oropharynx is clear.   Eyes:      Extraocular Movements: Extraocular movements intact.      Conjunctiva/sclera: Conjunctivae normal.   Neck:        Comments: Incision c/d/I with dermabond overlaying  ESTEBAN drain to right neck, SS  Cardiovascular:      Rate and Rhythm: Normal rate and regular rhythm.   Pulmonary:      Effort: Pulmonary effort is normal.      Breath sounds: Normal breath sounds.   Abdominal:      General: Abdomen is flat.      Palpations: Abdomen is soft.   Skin:     General: Skin is warm and dry.      Capillary Refill: Capillary refill takes less than 2 seconds.   Neurological:      General: No focal deficit present.      Mental Status: She is alert.          Significant Labs:  I have reviewed all pertinent lab results within the past 24 hours.  CBC:   Recent Labs   Lab 09/26/24  0533   WBC 7.04   RBC 3.89*   HGB 12.6   HCT 37.0      MCV 95   MCH 32.4*   MCHC 34.1     CMP:   Recent Labs   Lab 09/26/24  2206   *   CALCIUM 7.4*   ALBUMIN 3.2*      K 4.5   CO2 18*   *   BUN 12   CREATININE 1.0       Significant Diagnostics:  I have reviewed all pertinent imaging results/findings within the past 24 hours.

## 2024-09-27 NOTE — OP NOTE
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  Vocal fold evaluation prior to resection confirmed full mobility.  Multispecialty coordination of perioperative plan completed.  After a thorough discussion with the patient and her family, the patient elected to proceed with resection of the parathyroid carcinoma with excision of the involved segment of right recurrent laryngeal nerve, the right thyroid lobe, and the right level VI neck compartment, placement of a feeding tube, as well as repair of the right recurrent laryngeal nerve with a cadaver allograft with plastic surgery.    Surgeons and Role:  Panel 1:     * Zonia Quesada MD - Primary, Endocrine Surgeon     * Jania Hood MD - Resident - Assisting  (PGY-4)     * Abiodun Mckeon MD - Resident - Assisting  (PGY-4)  Panel 2:     * Carlos Raymond DO - Primary, Plastic Surgeon    Pre-Operative Diagnosis: Parathyroid carcinoma [C75.0]  Hypercalcemia [E83.52]    Post-Operative Diagnosis: Parathyroid carcinoma [C75.0]  Hypercalcemia [E83.52]    Anesthesia: General    Procedures:   Excision of parathyroid tumor en bloc with right recurrent laryngeal nerve, right thyroid lobectomy, and right level VI compartment dissection  Repair of right recurrent laryngeal nerve with cadaver allograft with Plastic Surgery   Rotational sternothyroid muscle flap with Plastic Surgery  Placement of 15 Fr round Gorge drain in right neck surgical bed  Placement of nasogastric tube with bridle   Intraoperative monitoring and interpretation of right cranial  nerves (vagus and recurrent laryngeal nerves) using the Neurovision Cobra EMG endotracheal tube and Medtronic NIM-Response 3.0 system     Intraoperative Findings:   Invasive parathyroid tumor arising from the right superior parathyroid gland involving the muscular layers of the esophagus, the posterior aspect of the larynx behind the cricothyroid joint, right thyroid lobe, surrounding fibroadipose tissue, and lymph nodes within right level VI compartment.  Right recurrent laryngeal nerve transected distally at the insertion point into the larynx and proximal to the tumor involvement.  Frozen sections of nerve negative for parathyroid tissue or malignancy.  Recurrent laryngeal nerve repaired with 2-3mm x 70mm Axogen Advance nerve allograft trimmed to appropriate length.  Bulging of the submucosal esophageal layer at the level of the tumor involvement distal to the larynx, buttressed with a rotational sternothyroid muscle flap.  The right recurrent laryngeal nerve signal was present in the central tumor and distally to the insertion point, the proximal recurrent laryngeal nerve signal was not present at the start of the case.    Description of the Procedure:  The patient was seen in the Holding Room. The risks, benefits, complications, treatment options, and expected outcomes were discussed with the patient. The possibilities of reaction to medication, pulmonary aspiration, bleeding, infection, finding a normal parathyroid tissue, inability to identify all explored parathyroid glands, recurrent or persistent hyperparathyroidism, temporary or permanent hypocalcemia, temporary or permanent hypoparathyroidism, superior laryngeal or recurrent laryngeal nerve damage, the need for additional procedures, failure to diagnose a condition, creating a complication requiring transfusion or operation, stroke, death and imponderables. The patient concurred with the proposed plan and agreed to proceed despite the risks, giving  informed consent.  The site of surgery was confirmed and marked. The patient was taken to Operating Room, identified as Erika Jenkins and the procedure verified as parathyroid tumor resection with recurrent laryngeal nerve resection, right thyroid lobectomy, repair of recurrent laryngeal nerve with allograft, and nasal feeding tube placement.     Induction of general anesthesia was performed, the patient was intubated with an electromyography endotracheal tube, and the anesthesia team placed all appropriate lines.  The patient was positioned by the operating room, anesthesia, and surgical staff in a modified beachchair position with a shoulder roll, the neck supported in an extended position, the arms padded and tucked.  The surgical field was prepped and draped in standard sterile fashion.  A timeout was performed.  The cervical incision was reopened, the prolene sutures approximating the platysma and strap muscles were removed, the seroma fluid was evacuated, and the surgical field was exposed.  A vagus signal could not be confirmed.  With rotation of the right thyroid lobe and tumor, the distal recurrent laryngeal nerve signal was tested and confirmed and the signal was obtained until the midportion of the tumor at which point the signal was lost and no proximal signal could be obtained.      The right thyroid lobe was mobilized.  The superior pole vessels were individually ligated with silk ties and divided with the LigaSure.  The superior pole was mobilized off the larynx and trachea, the ligament of Berry was divided with the bipolar forceps, and the upper thyroid was  off the trachea.  The thyroid was divided at the border of the isthmus and contralateral thyroid lobe with the LigaSure.  The thyroid lobe was further mobilized off the trachea with the bipolar forceps allowing for complete mobilization laterally.  The inferior thyroid vessels were divided and attention was turned to the parathyroid tumor.   There were a few connections between the parathyroid tumor in the thyroid, therefore the right thyroid lobe was excised, oriented with a short stitch at the superior pole and a long stitch at the isthmus, and passed off the field.  The parathyroid tumor was again noted to be firm and invasive into the surrounding tissue.  With a clear view of the distal and proximal ends of the recurrent laryngeal nerve, Dr. Raymond was invited to join at this point for orientation marking of the recurrent laryngeal nerve and division.  The distal and proximal ends of the recurrent laryngeal nerve were marked with a marking pen to maintain anatomic orientation.  The recurrent laryngeal nerve and and stump were measured to size the appropriate allograft.  The distal recurrent laryngeal nerve was mobilized to allow for a clean sharp transection of the nerve several mm away from the tumor.  A small section of the nerve was sent for frozen sectioning and confirmed there was no parathyroid tissue or malignancy at the margin.  The proximal recurrent laryngeal nerve was then identified entering into the tumor and was similarly mobilized and divided sharply with margins confirmed to be clear of malignancy with frozen sectioning.    The parathyroid tumor was then mobilized circumferentially, this portion of the surgery was quite challenging given the invasive nature of the tumor.  Along the superior medial aspect, the tumor was invasive into the muscular layer of the esophagus requiring partial resection of the muscle.  The tumor was also extending behind the larynx at the level of the cricothyroid joint where it was firm with fibrous bands extending posterior to the larynx.  With the tumor freed superiorly and medially attention was turned to the posterior wall which was excised with all surrounding fibroadipose tissue.  The tumor was oriented and passed off for permanent sectioning.  The orientation sutures included a short stitch at the  distal nerve margin, a long stitch at the proximal recurrent laryngeal nerve margin, a double stitch at the medial border where the tumor had been previously biopsied, and a loop stitch at the esophageal margin.    The right level 6 lymph node compartment was cleared of all fibroadipose tissue and lymph nodes, and this specimen was passed off for permanent sectioning unoriented.  Any additional tissue fragments from the right level 6 compartment were included with this specimen.    The surgical bed was copiously irrigated with sterile water.  The bulging submucosal layer of the esophagus was inspected, and there was no gross violation into the lumen.  The orogastric tube was exchanged for a nasogastric tube by anesthesia with direct communication with the surgical team, and the nasogastric tube tip was passed distal to the bulging esophageal submucosa.    Dr. Raymond was invited back to the operating room and performed a repair of the right recurrent laryngeal nerve with a 2-3mm x 70mm Axogen Advance nerve allograft trimmed to appropriate length and using the surgical microscope.  Fibrin glue was used to bolster the repair.  The sternothyroid muscle was then  from the sternohyoid muscle, the and cervicalis was clearly identified as were vascular pedicles at the superior and inferior poles of the muscle.  The muscle was divided along the superior pole and mobilized to allow for rotation into the surgical resection bed for appropriate muscle flap coverage of the bulging esophagus.  Additional fibrin glue was applied to the muscle flap.    The surgical bed was irrigated and inspected carefully. Multiple Valsalva maneuvers were performed at 30-40 cm of water and additional hemostasis was achieved as necessary.  A 15 [place] round Gorge drain was placed into the resection bed and brought out through a separate stab incision in the right supraclavicular region, placed to bulb suction, and secured with a nylon  suture.    0.25% Marcaine was injected into the subcutaneous tissue of the incision and drain site for post-op analgesia. The strap muscles were then closed with interrupted 3-0 Vicryl suture.  The platysma was closed with interrupted 3-0 Vicryl suture, and the skin incision was closed with a 6-0 Vicryl subcuticular knot-less closure. Sterile skin glue was applied to the incision.    A debrief was performed.  Specimens were confirmed.  Instrument, sponge, and needle counts were reported correct prior to closure and at the conclusion of the case.  The family was updated at the completion of the case.    Complications: No    Estimated Blood Loss (EBL): less than 50 mL           Drains:  15 [place] round Gorge drain, right neck, bulb suctioned    Implants: 2-3mm x 70mm Axogen Advance nerve allograft, use for repair of right recurrent laryngeal nerve    Specimens:   Specimen (24h ago, onward)       Start     Ordered    09/26/24 2123  Specimen to Pathology, Surgery Thyroid, Parathyroid, and Adrenals  Once        Comments: Pre-op Diagnosis: Parathyroid carcinoma [C75.0]Hypercalcemia [E83.52]Procedure(s):PARATHYROID CARCINOMA RESECTION WITH RIGHT THYROID LOBECTOMYCREATION, FLAP, MUSCLE ROTATION Number of specimens: 5Name of specimens: 1.) Distal Right Recurrent Laryngeal Nerve Margin, Frozen.2.) Proximal Right Recurrent Laryngeal Nerve Margin, Frozen.3.) Parathyroid Tumor; Stitches: Short - Distal Nerve Margin, Long - Proximal Nerve Margin, Double - Medial, partial tumor excision site, Loop - Esophageal Margin, Permanent.4.) Right Thyroid Lobe and Isthmus; Short Stitch - Superior Pole, Long Stitch - Isthmus. Permanent5.) Right Level VI Lymph Node Compartment, Permanent.     References:    Click here for ordering Quick Tip   Question Answer Comment   Procedure Type: Thyroid, Parathyroid, and Adrenals    Specimen Class: Known or suspected malignancy    Release to patient Immediate        09/26/24 2123                            Condition: stable    Disposition: PACU - hemodynamically stable.    Attestation: I was present and scrubbed for the entire procedure.

## 2024-09-27 NOTE — DISCHARGE INSTRUCTIONS
Post-Operative Instructions: Parathyroidectomy    MEDICATIONS  You are being discharged home on the following medications: you may crush your pills and put them in water or pudding if needed    Calcium Supplementation  Calcium Carbonate (Tums Ultra): 2 tablets, 2 times a day with meals  One Tums Ultra tablet has 1000 mg calcium carbonate which is equal to 400 mg elemental calcium.  *If you notice any numbness or tingling of the face, hands, or feet, take 2 extra tablets of Tums.  If symptoms do not subside within a half-hour, please call your surgeon's office.  Do NOT take your Tums in the morning of your lab draw.   Calcium carbonate (Tums) can cause constipation.  Please use over the counter stool softeners such as docusate (Colace) or laxatives such as polyethylene glycol (Miralax) to help avoid constipation.     Pain medication  Narcotic medication (tramadol) has been prescribed by your doctor for post-operative pain.   If you prefer, you may take Tylenol (acetaminophen).  Cepacol lozenges: Use as needed for sore throat.  These can be purchased at the pharmacy.    You may resume taking your normal medications, with the EXCEPTION of the following:  STOP TAKING cinacalcet (Sensipar)  You can restart the following medications 72 hours (3 days) after your surgery unless otherwise instructed by your surgeon or internist/cardiologist:  Apixaban (Eliquis), Clopidogrel (Plavix), Dabigatran (Pradaxa), Dipyridamole (Aggrenox), Rivaroxaban (Xarelto), Warfarin (Coumadin), or any other type of blood thinner you may be taking.  Aspirin & anti-inflammatories (i.e. Goody's, Excedrin, ibuprofen, Advil, Aleve): May begin 72 hours after surgery.  Vitamins, minerals, and herbal supplements: Please wait 1 week after surgery to restart these medications    WOUND CARE  Please use your ice pack for 30 minutes on / 30 minutes off for at least the first 7 days.  You will be discharged from the hospital with your incision covered by skin  glue that will stay on until your postoperative appointment.  It is normal to develop a lump under the incision, this is expected and is related to the healing process.  The lump will gradually go away with time.  You may shower when you return home after the surgery.  No bathing or swimming (do not submerge in water) until cleared by your surgeon.  Please notify your physician if your incision is red, warm/hot, if you have an increase in swelling, any new drainage, or if you have a fever >101.5 F.  Please call 911 or proceed to the Emergency Room if you have difficulty breathing.    DRAIN CARE  Wash hands--STRIP or milk the drainage tube as it comes out of your body toward the bulb.   Beginning where the drain comes out of your body, hold drainage tubing with one hand and with the other, stretch and release tubing an inch at time while moving downward with both hands toward the bulb.  Do this 2-3 times before emptying the bulb.  Remove the stopper from the bulbs port  (drainage port)  Pour the drainage out  Flatten/squeeze the bulb to create a vacuum and replace the stopper before letting go the of the bulb.  Record the date, time and amount of drainage in ccs (not ounces) each time bulb is emptied. If you have more than one drain, record each separately.  Discard the drainage into the toilet after measuring and then wash hands.  Empty bulbs 2 times/day or as needed if it fills up before 8 hours.  Remember to bring the output record with you to your doctors appointment.      ACTIVITIES  You may resume normal activities, depending on your energy level.  Please refrain from driving for at least 3 days, or until you have complete mobility of your neck.  Do not drive if you are taking narcotic pain medication.  Please refrain from heavy lifting (10 lbs.) for 10 days.    If you have any questions or concerns during the daytime, please send a [employer] Mobile message to your surgeon or call the surgeon's office at  [phone].  On nights and weekends, please call [phone] and ask for the General Surgery Resident on call.    Future Appointments   Date Time Provider Department [place]   10/11/2024  3:30 PM [facility]   10/14/2024  1:00 PM Zonia Quesada MD [facility]      Do NOT take your calcium supplements in the morning of your lab appointment.

## 2024-09-27 NOTE — DISCHARGE SUMMARY
[facility]  General Surgery  Discharge Summary      Patient Name: Erika Jenkins  MRN: [id number]  Admission Date: 9/24/2024  Hospital Length of Stay: 2 days  Discharge Date and Time: 9/27/2024  2:33 PM  Attending Physician: No att. providers found   Discharging Provider: Jania Hood MD  Primary Care Provider: Mellissa Choudhary MD    HPI:   No notes on file    Procedure(s) (LRB):  PARATHYROID CARCINOMA RESECTION WITH RIGHT THYROID LOBECTOMY AND RIGHT LEVEL VI COMPARTMENT DISSECTION (Right)  ROTATIONAL STERNOTHYROID MUSCLE FLAP (Right)  REPAIR, NERVE USING ALLOGRAFT (Right)      Indwelling Lines/Drains at time of discharge:   Lines/Drains/Airways       Drain  Duration                  Closed/Suction Drain 09/26/24 2056 Tube - 1 Right;Ventral Neck 15 Fr. <1 day         NG/OG Tube 09/26/24 1918 [place] sump 18 Fr. Right nostril <1 day                  Hospital Course: Erika Jenkins [place] [facility] [place] of [place]26/[place] for planned parathyroidectomy. 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 She was taken back  to the OR on 9/26/24 for resection of remainder of the parathyroid carcinoma, right recurrent laryngeal nerve resection, nerve repair w/ allograft. The procedure was performed without complication and she was transferred to the floor for further post op care and monitoring. Her postoperative course was uneventful and she progressed according to plan.  Her pain was controlled with a combination of IV and PO multimodal pain medications. Her diet has been advanced throughout her hospital stay. She is now ambulating without assistance, tolerating a regular diet, pain is well controlled with PO pain medication, and she is voiding appropriately. She now meets all criteria for discharge. She will follow up with ENT in 1 week for repeat laryngoscopy. She will be seen in 2 weeks in  endocrine clinic for drain removal.              Goals of Care Treatment Preferences:  Code Status: Full Code      Consults:   Consults (From admission, onward)          Status Ordering Provider     Inpatient consult to ENT  Once        Provider:  Nba Celestin MD    Completed HIREN MUSTAFA            Significant Diagnostic Studies: N/A    Pending Diagnostic Studies:       Procedure Component Value Units Date/Time    Phosphorus [[id number]] Collected: 09/27/24 0844    Order Status: Sent Lab Status: In process Updated: 09/27/24 0858    Specimen: Blood     Specimen to Pathology, Surgery Thyroid, Parathyroid, and Adrenals [[id number]] Collected: 09/26/24 2131    Order Status: Sent Lab Status: In process Updated: 09/27/24 0916    Specimen: Tissue           Final Active Diagnoses:    Diagnosis Date Noted POA    PRINCIPAL PROBLEM:  Primary hyperparathyroidism [E21.0] 07/25/2024 Yes      Problems Resolved During this Admission:      Discharged Condition: good    Disposition: Home or Self Care    Follow Up:    Patient Instructions:      Diet Adult Regular   Order Comments: Can eat what you wan, start soft if this is easier     Notify your health care provider if you experience any of the following:  temperature >100.4     Notify your health care provider if you experience any of the following:  persistent nausea and vomiting or diarrhea     Notify your health care provider if you experience any of the following:  severe uncontrolled pain     Notify your health care provider if you experience any of the following:  redness, tenderness, or signs of infection (pain, swelling, redness, odor or green/yellow discharge around incision site)     No dressing needed   Order Comments: Surgical glue is applied to your incision it will wear off in two weeks     Activity as tolerated     Medications:  Reconciled Home Medications:      Medication List        START taking these medications      calcium carbonate 400 mg calcium  (1,000 mg) Chew  Commonly known as: TUMS ULTRA  Take 2 tablets (800 mg total) by mouth 2 (two) times daily with meals.     gabapentin 250 mg/5 mL solution  Commonly known as: NEURONTIN  Take 5 mLs (250 mg total) by mouth 3 (three) times daily.     M- mg/5 mL Liqd  Generic drug: acetaminophen  Take 20.3 mLs (649.6 mg total) by mouth every 4 (four) hours as needed.     methocarbamoL 500 MG Tab  Commonly known as: ROBAXIN  Take 2 tablets (1,000 mg total) by mouth 4 (four) times daily. for 10 days     polyethylene glycol 17 gram Pwpk  Commonly known as: GLYCOLAX  Take 17 g by mouth once daily.  Start taking on: September 28, 2024     traMADoL 50 mg tablet  Commonly known as: ULTRAM  Take 1 tablet (50 mg total) by mouth every 6 (six) hours as needed for Pain.            CHANGE how you take these medications      ondansetron 8 MG Tbdl  Commonly known as: ZOFRAN-ODT  Take 1 tablet (8 mg total) by mouth 2 (two) times daily.  What changed:   medication strength  how much to take  how to take this  when to take this            CONTINUE taking these medications      albuterol 90 mcg/actuation inhaler  Commonly known as: PROVENTIL/VENTOLIN HFA  Inhale 2 puffs into the lungs 2 (two) times daily as needed for Wheezing or Shortness of Breath.            STOP taking these medications      cinacalcet 30 MG Tab  Commonly known as: SENSIPAR     dicyclomine 10 MG capsule  Commonly known as: BENTYL     ibuprofen 800 MG tablet  Commonly known as: ADVIL,MOTRIN     K-PHOS NO 2 305-700 mg Tab  Generic drug: potassium,sodium monobas phos     LINZESS 145 mcg Cap capsule  Generic drug: linaCLOtide     lisinopriL 10 MG tablet     montelukast 10 mg tablet  Commonly known as: SINGULAIR     pantoprazole 40 MG tablet  Commonly known as: PROTONIX            Time spent on the discharge of patient: 24 minutes    Jania Hood MD  General Surgery  [facility]

## 2024-09-27 NOTE — PLAN OF CARE
Nurses Note -- 4 Eyes      9/26/24  11:45 pm      Skin assessed during: Transfer      [] No Altered Skin Integrity Present    []Prevention Measures Documented      [x] Yes- Altered Skin Integrity Present or Discovered   [] LDA Added if Not in Epic (Describe Wound)   [] New Altered Skin Integrity was Present on Admit and Documented in LDA   [] Wound Image Taken    Wound Care Consulted? No    Attending Nurse:  SHAMIKA Ulloa BSN    Second RN/Staff Member:  Johanna    Problem: Adult Inpatient Plan of Care  Goal: Plan of Care Review  Outcome: Progressing  Goal: Patient-Specific Goal (Individualized)  Outcome: Progressing  Goal: Absence of Hospital-Acquired Illness or Injury  Outcome: Progressing  Goal: Optimal Comfort and Wellbeing  Outcome: Progressing  Goal: Readiness for Transition of Care  Outcome: Progressing     Problem: Bariatric Environmental Safety  Goal: Safety Maintained with Care  Outcome: Progressing     Problem: Infection  Goal: Absence of Infection Signs and Symptoms  Outcome: Progressing     Problem: Wound  Goal: Optimal Coping  Outcome: Progressing  Goal: Optimal Functional Ability  Outcome: Progressing  Goal: Absence of Infection Signs and Symptoms  Outcome: Progressing  Goal: Improved Oral Intake  Outcome: Progressing  Goal: Optimal Pain Control and Function  Outcome: Progressing  Goal: Skin Health and Integrity  Outcome: Progressing  Goal: Optimal Wound Healing  Outcome: Progressing     Problem: Pain Acute  Goal: Optimal Pain Control and Function  Outcome: Progressing     Problem: Fall Injury Risk  Goal: Absence of Fall and Fall-Related Injury  Outcome: Progressing

## 2024-09-27 NOTE — HOSPITAL COURSE
Erika Jenkins presented to [facility] on the morning of 9/26/2024 for planned parathyroidectomy. 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 She was taken back  to the OR on 9/26/24 for resection of remainder of the parathyroid carcinoma, right recurrent laryngeal nerve resection, nerve repair w/ allograft. The procedure was performed without complication and she was transferred to the floor for further post op care and monitoring. Her postoperative course was uneventful and she progressed according to plan.  Her pain was controlled with a combination of IV and PO multimodal pain medications. Her diet has been advanced throughout her hospital stay. She is now ambulating without assistance, tolerating a regular diet, pain is well controlled with PO pain medication, and she is voiding appropriately. She now meets all criteria for discharge. She will follow up with ENT in 1 week for repeat laryngoscopy. She will be seen in 2 weeks in endocrine clinic for drain removal.

## 2024-09-27 NOTE — ASSESSMENT & PLAN NOTE
49F s/p partial resection of a right superior parathyroid tumor. Will continue work up in patient with sestamibi scan today. ENT on board, laryngoscope yesterday w/ normal vocal cord function bilaterally. Now s/p parathyroid tumor, right recurrent laryngeal nerve resection, nerve repair with allografting, level VI dissection, and rotational muscle flap.     -- OR yesterday, doing well  -- NGT in place   -- strict NPO, mIVF  -- MM pain control (scheduled IV tylenol, IV robaxin; prn oxy5 and dilaudid)  -- okay for meds per NGT if needed  -- ENT on board, plan for scope today if able  -- possible speech eval, will coordinate with ENT   -- bowel regimen   -- home meds restarted as appropriate  -- daily CBC, RFP   -- may need some calcium replacement, will follow up RFP this am    
49F s/p partial resection of a right superior parathyroid tumor. Will continue work up in patient with sestamibi scan today. ENT on board, laryngoscope yesterday w/ normal vocal cord function bilaterally. OR today.     -- OR today   -- informed consent obtained and placed in chart   -- NPO since midnight  -- mIVF since midnight  -- MM pain control (tylenol, robaxin, gabapentin, oxy5)  -- bowel regimen   -- home meds restarted as appropriate  -- daily CBC, RFP     
49F s/p partial resection of a right superior parathyroid tumor. Will continue work up in patient with sestamibi scan today. ENT to perform laryngoscope today.     -- regular diet  -- MM pain control   -- home meds restarted as appropriate  -- daily CBC, RFP   -- stestamibi today   
Erika Jenkins is a 49 y.o. female presents to the hospital for parathyroidectomy with Dr. Quesada (endocrine surgery) on 9/24/2024 for hyperparathyroidism presumed to be due to adenoma; intraoperative findings were concerning for possible parathyroid carcinoma given concern for invasion/involvement of right RLN after extensive multidisciplinary collaboration intra-op decision made to abort remainder of procedure and plan for further workup and likely definitive resection w RLN sacrifice and plastics nerve graft at a later date.     ENT consulted for laryngeal assessment prior to further intervention. Bedside scope exam demonstrates bilateral mobility of TVF.    -- Will discuss findings with surgery team   -- Remainder of care per primary team  -- Please page ENT on call with any additional questions or concerns     
MSK XR negative - No fracture, No dislocation, No foreign body

## 2024-09-27 NOTE — ANESTHESIA POSTPROCEDURE EVALUATION
Anesthesia Post Evaluation    Patient: Erika Jenkins    Procedure(s) Performed: Procedure(s) (LRB):  PARATHYROID CARCINOMA RESECTION WITH RIGHT THYROID LOBECTOMY AND RIGHT LEVEL VI COMPARTMENT DISSECTION (Right)  ROTATIONAL STERNOTHYROID MUSCLE FLAP (Right)  REPAIR, NERVE USING ALLOGRAFT (Right)    Final Anesthesia Type: general      Patient location during evaluation: PACU  Patient participation: Yes- Able to Participate  Level of consciousness: awake and alert  Post-procedure vital signs: reviewed and stable  Pain management: adequate  Airway patency: patent    PONV status at discharge: No PONV  Anesthetic complications: no      Cardiovascular status: blood pressure returned to baseline  Respiratory status: unassisted  Hydration status: euvolemic  Follow-up not needed.          Vitals Value Taken Time   /69 09/26/24 2247   Temp 36.7 °C (98.1 °F) 09/26/24 2155   Pulse 82 09/26/24 2257   Resp 16 09/26/24 2257   SpO2 96 % 09/26/24 2257   Vitals shown include unfiled device data.      No case tracking events are documented in the log.      Pain/Jhoana Score: Pain Rating Prior to Med Admin: 9 (9/26/2024 10:31 PM)  Pain Rating Post Med Admin: 6 (9/26/2024  9:18 AM)

## 2024-09-27 NOTE — NURSING TRANSFER
Nursing Transfer Note      9/26/2024   11:52 PM    Nurse giving handoff:Johanna WIKC PACU  Nurse receiving handoff:Artemio DURAN    Reason patient is being transferred: s/p anesthesia recovery    Transfer To: Room 1003    Transfer via stretcher    Transported by this RN    Transfer Vital Signs: see flowsheets    Telemetry: Rate 65 and Rhythm NSR  Order for Tele Monitor? No    Additional Lines: NG tube    Any special needs or follow-up needed: none    Patient belongings transferred with patient: No    Chart send with patient: Yes    Notified: boyfriend    Patient reassessed at: 9/26/24 at 2330 (date, time)    Upon arrival to floor: patient oriented to room, call bell in reach, and bed in lowest position

## 2024-09-30 ENCOUNTER — PATIENT MESSAGE (OUTPATIENT)
Dept: OTOLARYNGOLOGY | Facility: CLINIC | Age: 49
End: 2024-09-30
Payer: MEDICAID

## 2024-09-30 LAB
BLD PROD TYP BPU: NORMAL
BLD PROD TYP BPU: NORMAL
BLOOD UNIT EXPIRATION DATE: NORMAL
BLOOD UNIT EXPIRATION DATE: NORMAL
BLOOD UNIT TYPE CODE: 6200
BLOOD UNIT TYPE CODE: 6200
BLOOD UNIT TYPE: NORMAL
BLOOD UNIT TYPE: NORMAL
CODING SYSTEM: NORMAL
CODING SYSTEM: NORMAL
CROSSMATCH INTERPRETATION: NORMAL
CROSSMATCH INTERPRETATION: NORMAL
DISPENSE STATUS: NORMAL
DISPENSE STATUS: NORMAL
NUM UNITS TRANS PACKED RBC: NORMAL
NUM UNITS TRANS PACKED RBC: NORMAL

## 2024-10-01 ENCOUNTER — TELEPHONE (OUTPATIENT)
Dept: OTOLARYNGOLOGY | Facility: CLINIC | Age: 49
End: 2024-10-01
Payer: MEDICAID

## 2024-10-01 ENCOUNTER — PATIENT MESSAGE (OUTPATIENT)
Dept: OTOLARYNGOLOGY | Facility: CLINIC | Age: 49
End: 2024-10-01
Payer: MEDICAID

## 2024-10-01 NOTE — PLAN OF CARE
[facility]  Discharge Final Note    Primary Care Provider: Mellissa Choudhary MD  Expected Discharge Date: 9/27/2024    Patient medically ready for discharge to home.     Transportation by family.     Is family/patient aware of discharge: yes     Hospital follow up scheduled: yes       Final Discharge Note (most recent)       Final Note - 10/01/24 1624          Final Note    Assessment Type Final Discharge Note     Anticipated Discharge Disposition Home or Self Care     Hospital Resources/Appts/Education Provided Provided patient/caregiver with written discharge plan information                     Important Message from Medicare         Referral Info (most recent)       Referral Info    No documentation.                   Future Appointments   Date Time Provider Department [place]   10/11/2024  3:30 PM LAB, [facility] LAB [place]   10/14/2024  1:00 PM Zonia Quesada MD [facility]   10/15/2024  1:00 PM Nba Celestin MD [facility] VOI JUAN [facility] [place], SHAM[place]  Case Management  Ext: [phone]  10/01/2024

## 2024-10-02 LAB
FINAL PATHOLOGIC DIAGNOSIS: ABNORMAL
FROZEN SECTION DIAGNOSIS: ABNORMAL
FROZEN SECTION FOOTNOTE: ABNORMAL
GROSS: ABNORMAL
Lab: ABNORMAL

## 2024-10-02 NOTE — OP NOTE
[facility]  Plastic Surgery  Operative Note    SUMMARY     Date of Procedure: 9/26/2024     Location:  [facility] [address]    Procedure(s): Procedure(s) (LRB):  PARATHYROID CARCINOMA RESECTION WITH RIGHT THYROID LOBECTOMY AND RIGHT LEVEL VI COMPARTMENT DISSECTION (Right)  ROTATIONAL STERNOTHYROID MUSCLE FLAP (Right)  REPAIR, NERVE USING ALLOGRAFT (Right)     Repair of right recurrent laryngeal nerve with interposition nerve allograft  Pedicled sternothyroid muscle flap for esophageal coverage    Surgeons and Role:  Panel 1:     * Zonia Quesada MD - Primary     * Jania Hood MD - Resident - Assisting     * Abiodun Mckeon MD - Resident - Assisting  Panel 2:     * Carlos Raymond DO - Primary    Assistant: TIERA Cutler    Pre-Operative Diagnosis: Parathyroid carcinoma [C75.0]  Hypercalcemia [E83.52]    Post-Operative Diagnosis: same    Anesthesia: General    Indications for Procedure:  This 49-year-old woman.  She had a parathyroid secreting tumor that 2 days prior was found to be a parathyroid adenocarcinoma.  It was invading into the recurrent laryngeal nerve.  She was taken to the operating room today for radical resection including division of the recurrent laryngeal nerve, thyroidectomy, partial neck dissection.  Additionally the tumor was invading into the outer wall of the esophagus and a muscle flap was used to buttress the esophagus.    Operative Findings (including complications, if any):  Roughly 3 cm gap in the right recurrent laryngeal nerve.  2-3 mm x 70 mm AxoGen nerve allograft was used and trimmed to the appropriate length for a position nerve graft   Caudally based dermato thyroid muscle flap was fully mobilized and placed down to the tracheoesophageal groove to buttress the weekend esophagus.    Description of Procedures:  I entered the room after the procedure had started.  It was already incision in the right neck.  It was scrubbed in it was Dr. Quesada and  identified the recurrent laryngeal nerve as it entered and exited the parathyroid tumor.  The recurrent laryngeal nerve was then marked and divided.  A marking been was used to orient the nerve.  Using loupe magnification, when a grossly normal nerve was appreciated on either side of the tumor, the nerve was sharply divided using a micro dissecting scissors.  A small piece of the nerve was then taken proximally towards the tumor on each side.  This was sent as frozen sections to rule out any perineural invasion.  Dr. Quesada then completed the rest of the resection.    I entered the room after the resection was complete.  I could clearly see the ends of the recurrent laryngeal nerve.  It was about a 3 cm gap.  Additionally portion of the tumor has been densely adherent to the esophagus in the most proximal portion of the esophagus at a portion of the outer wall that has been removed.      I began by placing the interposition graft.  Using the micro background measured the diameter of the nerve.  It was about 3 mm caudally and 2 mm cranially.  A 2-0 3 mm AxoGen nerve allograft was selected and allowed to fall on the back table.  Exposure was obtained using self-retaining retractors.  The operating microscope was brought in.  The [facility] nerve monitor was used to identify the recurrent laryngeal nerve.  I dropped the nerve graft end of the wound and also used a micro background using 8 0 nylon on a spatulated needle 3 epineural sutures were placed from the graft to the caudal recurrent laryngeal nerve.  After that was done I then laid the nerve in the most direct route to the cranial portion of the recurrent laryngeal.  I then trimmed the nerve allograft sharply with scissors so there was no significant redundancy but also no tension on the nerve for coaptation.  Nerve coaptation was then performed at the cranial recurrent laryngeal nerve stump also using 3 interrupted 8 0 nylon suture.  Tisseel was then used to cover both  nerve coaptation.      We discussed the weakened area of the esophagus and decided to buttress that with a muscle flap.  Using blunt dissection with tenotomy scissors it was able to separate the sternothyroid muscle from the other strap muscles.  Dissected cranially and caudally along the sternothyroid.  The ansa cervicalis was identified on the lateral border of the muscle, was  from the muscle and was protected.  I identified both a cranial and caudal muscle branches.  The caudal branch supplying the muscle seemed larger and more robust than the cranial branch, therefore we decided to perform a distally based muscle flap.  The sternothyroid muscle was divided cranially at the level of the larynx.  This allowed it to drop down into the tracheal esophageal groove.  Using some fascia on the trachea it was able to progressively advanced and sutured down the sternothyroid muscle to the trachea.  It was also some fascia above the esophagus that we are able to anchor tube.  All that was done with 3-0 Vicryl sutures.  A no sutures were placed directly into the esophagus.  The esophagus appeared well walled off it was visible.  The remaining Tisseel was sprayed over the muscle and the deepest portions of the wound bed.      I then turned the procedure back over to Dr Quesada to place a drain and close.  Please see her operative note for her portions of the procedure    Significant Surgical Tasks Conducted by the Assistant(s), if Applicable: The presence of Valeria Duran PA-C as first assistant was required due to the complexity of the procedure relative to any available residents. I certify that no resident was available who was qualified to serve as first assistant. Duties performed by the assistant included assisting the primary surgeon    Estimated Blood Loss (EBL): 10mL           Implants:   Implant Name Type Inv. Item Serial No.  Lot No. LRB No. Used Action   GRAFT AVANCE NERVE [id number] -  [id number]  GRAFT AVANCE NERVE 2-3X70MM  AXOGEN [id number] 1 Implanted       Specimens:   Specimen (24h ago, onward)      None                    Condition: Good    Disposition: PACU - hemodynamically stable.,     Attestation: I was present and scrubbed for the entire procedure. As described by me

## 2024-10-11 ENCOUNTER — LAB VISIT (OUTPATIENT)
Dept: LAB | Facility: HOSPITAL | Age: 49
End: 2024-10-11
Attending: STUDENT IN AN ORGANIZED HEALTH CARE EDUCATION/TRAINING PROGRAM
Payer: MEDICAID

## 2024-10-11 DIAGNOSIS — Z98.890 S/P PARATHYROIDECTOMY: ICD-10-CM

## 2024-10-11 DIAGNOSIS — Z90.89 S/P PARATHYROIDECTOMY: ICD-10-CM

## 2024-10-11 DIAGNOSIS — E21.0 PRIMARY HYPERPARATHYROIDISM: ICD-10-CM

## 2024-10-11 LAB
ALBUMIN SERPL BCP-MCNC: 3.8 G/DL (ref 3.5–5.2)
ANION GAP SERPL CALC-SCNC: 9 MMOL/L (ref 8–16)
BUN SERPL-MCNC: 11 MG/DL (ref 6–20)
CALCIUM SERPL-MCNC: 9.8 MG/DL (ref 8.7–10.5)
CHLORIDE SERPL-SCNC: 109 MMOL/L (ref 95–110)
CO2 SERPL-SCNC: 21 MMOL/L (ref 23–29)
CREAT SERPL-MCNC: 1 MG/DL (ref 0.5–1.4)
EST. GFR  (NO RACE VARIABLE): >60 ML/MIN/1.73 M^2
GLUCOSE SERPL-MCNC: 86 MG/DL (ref 70–110)
PHOSPHATE SERPL-MCNC: 3.7 MG/DL (ref 2.7–4.5)
POTASSIUM SERPL-SCNC: 4.4 MMOL/L (ref 3.5–5.1)
SODIUM SERPL-SCNC: 139 MMOL/L (ref 136–145)

## 2024-10-11 PROCEDURE — 36415 COLL VENOUS BLD VENIPUNCTURE: CPT | Mod: PO | Performed by: STUDENT IN AN ORGANIZED HEALTH CARE EDUCATION/TRAINING PROGRAM

## 2024-10-11 PROCEDURE — 80069 RENAL FUNCTION PANEL: CPT | Performed by: STUDENT IN AN ORGANIZED HEALTH CARE EDUCATION/TRAINING PROGRAM

## 2024-10-13 NOTE — PROGRESS NOTES
Postoperative Endocrine Surgery Clinic Note    Reason for visit / Chief complaint: Postoperative evaluation  Endocrinologist: Vaibhav Joseph DO  Procedure:  Parathyroid Carcinoma Resection With Right Thyroid Lobectomy And Right Level Vi Compartment Dissection - Right, Rotational Sternothyroid Muscle Flap - Right, and Repair, Nerve Using Allograft - Right  Procedure Date: 9/26/2024    Subjective:     Erika Jenkins returns today for postoperative evaluation, she is approximately 2 weeks post op.    Procedure:   Excision of parathyroid tumor en bloc with right recurrent laryngeal nerve, right thyroid lobectomy, and right level VI compartment dissection  Repair of right recurrent laryngeal nerve with cadaver allograft with Plastic Surgery   Rotational sternothyroid muscle flap with Plastic Surgery  Placement of 15 Fr round Gorge drain in right neck surgical bed  Placement of nasogastric tube with bridle   Intraoperative monitoring and interpretation of right cranial nerves (vagus and recurrent laryngeal nerves) using the Neurovision Cobra EMG endotracheal tube and Medtronic NIM-Response 3.0 system     Operative findings:   Invasive parathyroid tumor arising from the right superior parathyroid gland involving the muscular layers of the esophagus, the posterior aspect of the larynx behind the cricothyroid joint, right thyroid lobe, surrounding fibroadipose tissue, and lymph nodes within right level VI compartment.  Right recurrent laryngeal nerve transected distally at the insertion point into the larynx and proximal to the tumor involvement.  Frozen sections of nerve negative for parathyroid tissue or malignancy.  Recurrent laryngeal nerve repaired with 2-3mm x 70mm Axogen Advance nerve allograft trimmed to appropriate length.  Bulging of the submucosal esophageal layer at the level of the tumor involvement distal to the larynx, buttressed with a rotational sternothyroid muscle flap.  The right recurrent laryngeal nerve  "signal was present in the central tumor and distally to the insertion point, the proximal recurrent laryngeal nerve signal was not present at the start of the case.      Reports voice is strong, denies aspiration or significant voice fatigue.  She reports a strong cough but unable to project voice to yell/scream.  We discussed the possibility of weakening of her voice over time due to vocal fold atrophy.  She has follow up with Dr. Fawad robb.  She has felt the most relief with the liquid acetaminophen and requests a refill.  We had a long discussion about the prognosis of parathyroid carcinoma, the likelihood of recurrence, and my recommendation to be evaluated at [facility] in [place] for this extremely rare carcinoma.      Current Outpatient Medications   Medication Sig Dispense Refill    albuterol (PROVENTIL/VENTOLIN HFA) 90 mcg/actuation inhaler Inhale 2 puffs into the lungs 2 (two) times daily as needed for Wheezing or Shortness of Breath. 18 g 1    gabapentin (NEURONTIN) 250 mg/5 mL solution Take 5 mLs (250 mg total) by mouth 3 (three) times daily. 210 mL 0    ondansetron (ZOFRAN-ODT) 8 MG TbDL Take 1 tablet (8 mg total) by mouth 2 (two) times daily. 60 tablet 0    polyethylene glycol (GLYCOLAX) 17 gram PwPk Take 17 g by mouth once daily.      acetaminophen (TYLENOL) 160 mg/5 mL Liqd Take 20.3 mLs (649.6 mg total) by mouth every 4 (four) hours as needed (As needed for postoperative pain). 2365 mL 0     No current facility-administered medications for this visit.     Review of patient's allergies indicates:   Allergen Reactions    Codeine     Penicillins Rash       Review of Systems  Negative except as per HPI.     Objective:   BP (!) 150/100   Ht 5' 7" (1.702 m)   Wt 135.7 kg (299 lb 2.6 oz)   BMI 46.86 kg/m²     General: alert, well appearing, and in no distress  Neck: neck is flat, no erythema or edema  Incision: well approximated, healing well  Neurological: phonation is normal    Labs:  Lab " Results   Component Value Date    CALCIUM 9.8 10/11/2024    ALBUMIN 3.8 10/11/2024    PHOS 3.7 10/11/2024       Pathology:  Final Pathologic Diagnosis   Date Value Ref Range Status   09/26/2024 (A)  Final    1. Nerve, right recurrent laryngeal, distal margin, excision:  - Benign nerve tissue  - Negative for malignancy    2. Nerve, right recurrent laryngeal, proximal margin, excision:  - Benign nerve tissue  - Negative for malignancy    3. Parathyroid tumor, esophagus, and right recurrent laryngeal nerve, en bloc excision:  - Parathyroid chief cell carcinoma  - Anterior, medial, and posterior margins are positive for tumor  - Lymphovascular invasion is identified  - Tumor necrosis is identified  - See synoptic report below    4. Thyroid, right lobe and isthmus, hemithyroidectomy:  - Thyroid with benign adenomatoid nodules  - Benign parathyroid tissue  - One lymph node, negative for metastatic carcinoma (0/1)    5. Lymph nodes, right neck, level VI, lymphadenectomy:- No lymph nodes identified (multiple additional levels examined)  - Negative for malignancy    _________________________________________________________  CASE SUMMARY: (GENERAL TUMOR RESECTION: Reporting Template)   May be used for any malignancy for which an appropriate organ-specific resection protocol is not available.    SPECIMEN    Procedure: En bloc excision of parathyroid tumor, right recurrent laryngeal nerve, right thyroid lobectomy, and right level VI compartment dissection  Specimen Laterality: Right    TUMOR    Tumor Site: Right superior parathyroid  Histologic Type: Parathyroid chief cell carcinoma  Tumor Size: Greatest dimension in Centimeters (cm):    Additional Dimension in Centimeters (cm):   Tumor Extent (specify structures or organs involved by tumor): Right superior parathyroid, right thyroid (on prior case [id number]), esophagus, right recurrent laryngeal nerve  Mitotic Rate: 8 mitoses per 10 mm^2  Tumor Necrosis: Present     Percentage of Tumor Necrosis: 5%  Lymphovascular Invasion: Present  Treatment Effect: No known preoperative therapy    MARGINS    Margin Status: Tumor present at margin    Margin(s) Involved by Tumor: Anterior, medial, posterior    REGIONAL LYMPH NODES    Regional Lymph Node Status: Regional lymph nodes present    All regional lymph nodes negative for tumor  Number of Lymph Node(s) Examined: Exact number: 1  Lymph Node Comment: Lymph Node(s) Examined: Right neck level VI (perithyroidal)    STAGE    Stage or Classification System: None applicable    Selected Blocks for Possible Future Testing: 3-C, 3-D, 3-E, 3-F       Comment:     Interp By Carlos Castro D.O., Signed on 10/02/2024 at 09:40         Assessment and Plan     Problem List Items Addressed This Visit       Parathyroid carcinoma    Overview     Invasive parathyroid carcinoma arising from the right superior parathyroid gland with gross invasion of the right recurrent laryngeal nerve, muscular layers of the esophagus, the posterior aspect of the larynx behind the cricothyroid joint, right thyroid lobe, and surrounding fibroadipose tissue s/p excision of parathyroid tumor en bloc with right recurrent laryngeal nerve, right thyroid lobectomy, and right level VI compartment, as well as right recurrent laryngeal nerve repair with 2-3mm Axogen Advance nerve allograft, and rotational sternothyroid muscle flap on 09/26/2024.  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         Current Assessment & Plan     - Reviewed pathology.  I have recommended evaluation at [facility] in [place] as this is an extremely rare diagnosis and despite the high volume of parathyroid cases, parathyroid carcinoma would be best managed by an even more specialized providers  - Will discuss case at the [facility]   - Have ordered a  TSH, RFP and PTH to be drawn at a 4-6 week post op interval   - She has follow up scheduled with Dr. Celestin scheduled          Other Visit Diagnoses       S/P parathyroidectomy    -  Primary          Patient was seen and evaluated with surgery resident Carlos Maher MD.    Zonia Quesada MD  Staff Surgeon  Endocrine Surgery  10/14/24

## 2024-10-14 ENCOUNTER — PATIENT MESSAGE (OUTPATIENT)
Dept: SURGERY | Facility: CLINIC | Age: 49
End: 2024-10-14
Payer: MEDICAID

## 2024-10-14 ENCOUNTER — OFFICE VISIT (OUTPATIENT)
Dept: SURGERY | Facility: CLINIC | Age: 49
End: 2024-10-14
Payer: MEDICAID

## 2024-10-14 VITALS
BODY MASS INDEX: 45.99 KG/M2 | WEIGHT: 293 LBS | HEIGHT: 67 IN | SYSTOLIC BLOOD PRESSURE: 150 MMHG | DIASTOLIC BLOOD PRESSURE: 100 MMHG

## 2024-10-14 DIAGNOSIS — Z98.890 S/P PARATHYROIDECTOMY: Primary | ICD-10-CM

## 2024-10-14 DIAGNOSIS — Z90.89 S/P PARATHYROIDECTOMY: Primary | ICD-10-CM

## 2024-10-14 DIAGNOSIS — C75.0 PARATHYROID CARCINOMA: ICD-10-CM

## 2024-10-14 PROCEDURE — 99213 OFFICE O/P EST LOW 20 MIN: CPT | Mod: PBBFAC | Performed by: STUDENT IN AN ORGANIZED HEALTH CARE EDUCATION/TRAINING PROGRAM

## 2024-10-14 PROCEDURE — 1159F MED LIST DOCD IN RCRD: CPT | Mod: CPTII,,, | Performed by: STUDENT IN AN ORGANIZED HEALTH CARE EDUCATION/TRAINING PROGRAM

## 2024-10-14 PROCEDURE — 3077F SYST BP >= 140 MM HG: CPT | Mod: CPTII,,, | Performed by: STUDENT IN AN ORGANIZED HEALTH CARE EDUCATION/TRAINING PROGRAM

## 2024-10-14 PROCEDURE — 4010F ACE/ARB THERAPY RXD/TAKEN: CPT | Mod: CPTII,,, | Performed by: STUDENT IN AN ORGANIZED HEALTH CARE EDUCATION/TRAINING PROGRAM

## 2024-10-14 PROCEDURE — 99999 PR PBB SHADOW E&M-EST. PATIENT-LVL III: CPT | Mod: PBBFAC,,, | Performed by: STUDENT IN AN ORGANIZED HEALTH CARE EDUCATION/TRAINING PROGRAM

## 2024-10-14 PROCEDURE — 99024 POSTOP FOLLOW-UP VISIT: CPT | Mod: ,,, | Performed by: STUDENT IN AN ORGANIZED HEALTH CARE EDUCATION/TRAINING PROGRAM

## 2024-10-14 PROCEDURE — 3044F HG A1C LEVEL LT 7.0%: CPT | Mod: CPTII,,, | Performed by: STUDENT IN AN ORGANIZED HEALTH CARE EDUCATION/TRAINING PROGRAM

## 2024-10-14 PROCEDURE — 3080F DIAST BP >= 90 MM HG: CPT | Mod: CPTII,,, | Performed by: STUDENT IN AN ORGANIZED HEALTH CARE EDUCATION/TRAINING PROGRAM

## 2024-10-14 RX ORDER — ACETAMINOPHEN 160 MG/5ML
650 LIQUID ORAL EVERY 4 HOURS PRN
Qty: 2365 ML | Refills: 0 | Status: SHIPPED | OUTPATIENT
Start: 2024-10-14 | End: 2024-11-13

## 2024-10-14 NOTE — ASSESSMENT & PLAN NOTE
- Reviewed pathology.  I have recommended evaluation at [facility] in [place] as this is an extremely rare diagnosis and despite the high volume of parathyroid cases, parathyroid carcinoma would be best managed by an even more specialized providers  - Will discuss case at the [facility]   - Have ordered a TSH, RFP and PTH to be drawn at a 4-6 week post op interval   - She has follow up scheduled with Dr. Celestin scheduled

## 2024-10-15 ENCOUNTER — OFFICE VISIT (OUTPATIENT)
Dept: OTOLARYNGOLOGY | Facility: CLINIC | Age: 49
End: 2024-10-15
Payer: MEDICAID

## 2024-10-15 ENCOUNTER — TELEPHONE (OUTPATIENT)
Dept: ENDOCRINOLOGY | Facility: CLINIC | Age: 49
End: 2024-10-15
Payer: MEDICAID

## 2024-10-15 VITALS — SYSTOLIC BLOOD PRESSURE: 150 MMHG | DIASTOLIC BLOOD PRESSURE: 97 MMHG | HEART RATE: 79 BPM

## 2024-10-15 DIAGNOSIS — R49.0 DYSPHONIA: ICD-10-CM

## 2024-10-15 DIAGNOSIS — J38.01 UNILATERAL COMPLETE VOCAL FOLD PARALYSIS: Primary | ICD-10-CM

## 2024-10-15 PROCEDURE — 1159F MED LIST DOCD IN RCRD: CPT | Mod: CPTII,,, | Performed by: OTOLARYNGOLOGY

## 2024-10-15 PROCEDURE — 31579 LARYNGOSCOPY TELESCOPIC: CPT | Mod: S$PBB,,, | Performed by: OTOLARYNGOLOGY

## 2024-10-15 PROCEDURE — 3077F SYST BP >= 140 MM HG: CPT | Mod: CPTII,,, | Performed by: OTOLARYNGOLOGY

## 2024-10-15 PROCEDURE — 3044F HG A1C LEVEL LT 7.0%: CPT | Mod: CPTII,,, | Performed by: OTOLARYNGOLOGY

## 2024-10-15 PROCEDURE — 1111F DSCHRG MED/CURRENT MED MERGE: CPT | Mod: CPTII,,, | Performed by: OTOLARYNGOLOGY

## 2024-10-15 PROCEDURE — 4010F ACE/ARB THERAPY RXD/TAKEN: CPT | Mod: CPTII,,, | Performed by: OTOLARYNGOLOGY

## 2024-10-15 PROCEDURE — 3080F DIAST BP >= 90 MM HG: CPT | Mod: CPTII,,, | Performed by: OTOLARYNGOLOGY

## 2024-10-15 PROCEDURE — 31579 LARYNGOSCOPY TELESCOPIC: CPT | Mod: PBBFAC | Performed by: OTOLARYNGOLOGY

## 2024-10-15 PROCEDURE — 99999 PR PBB SHADOW E&M-EST. PATIENT-LVL III: CPT | Mod: PBBFAC,,, | Performed by: OTOLARYNGOLOGY

## 2024-10-15 PROCEDURE — 99213 OFFICE O/P EST LOW 20 MIN: CPT | Mod: 25,S$PBB,, | Performed by: OTOLARYNGOLOGY

## 2024-10-15 PROCEDURE — 1160F RVW MEDS BY RX/DR IN RCRD: CPT | Mod: CPTII,,, | Performed by: OTOLARYNGOLOGY

## 2024-10-15 PROCEDURE — 99213 OFFICE O/P EST LOW 20 MIN: CPT | Mod: PBBFAC | Performed by: OTOLARYNGOLOGY

## 2024-10-16 DIAGNOSIS — C75.0 PARATHYROID CARCINOMA: Primary | ICD-10-CM

## 2024-10-16 NOTE — TELEPHONE ENCOUNTER
Case presented at the Endocrine Multidisciplinary Tumor Board on 10/15/24.     After discussion, it was decided that the best course of action would be to have the patient evaluated at [facility] for further management. Pathology team at [facility] will proceed with tumor sequencing in the meantime.   Labs- PTH, RFP and TSH to be obtained 4-6 weeks post-op.

## 2024-10-17 NOTE — PROGRESS NOTES
[facility]  Department of Otorhinolaryngology and Communication Sciences    Subjective:      Erika Jenkins is a 49 y.o. female who presents for follow-up. She has right vocal fold paralysis following oncologic parathyroid surgery, with primary nerve graft repair.    She is doing well since her discharge from the hospital.  She is tolerating an unrestricted diet p.o..  Her voice is good, essentially at baseline.  She is pleased with her progress and with her current status.    The patient's medications, allergies, past medical, surgical, social and family histories were reviewed and updated as appropriate.    A detailed review of systems was obtained with pertinent positives as per the above HPI, and otherwise negative.      Objective:     BP (!) 150/97   Pulse 79      Constitutional: comfortable, well dressed  Psychiatric: appropriate affect  Respiratory: comfortably breathing, symmetric chest rise, no stridor  Voice:  Faint inconsistent weakness  Head: normocephalic  Eyes: conjunctivae and sclerae clear  Indirect laryngoscopy is limited due to gag    Procedure  Flexible Laryngeal Videostroboscopy (15065): Laryngeal videostroboscopy is indicated to assess laryngeal vibratory biomechanics and vocal fold oscillation, which cannot be assessed with a plain light examination. This was carried out transnasally with a distal chip videoendoscope. After verbal consent was obtained, the patient was positioned and the nose was topically decongested with 1% phenylephrine and topically anesthetized with 4% lidocaine. The endoscope was passed through the most patent nasal cavity and positioned to image the nasopharynx, larynx, and hypopharynx in detail. The following features were examined: nasopharyngeal, laryngeal, hypopharyngeal masses; velopharyngeal strength, closure, and symmetry of motion; vocal fold range and symmetry of motion; laryngeal mucosal edema, erythema, inflammation, and hydration; salivary pooling; and  gross laryngeal sensation. During phonation, the vocal folds were assessed for glottal closure; mucosal wave; vocal fold lesions; vibratory periodicity, amplitude, and phase symmetry; and vertical height match. The equipment was removed. The patient tolerated the procedure well without complication. All findings were normal except:  - right vocal fold immobile, paramedian, favorable bulk/town  - complete closure, pliability intact  -concentric supraglottic hyperfunction, mild      Assessment:     Erika Jenkins is a 49 y.o. female with right vocal fold paralysis following oncologic parathyroid surgery, with primary repair via nerve graft in 9/2024, currently very well compensated     Plan:     Reassurance was provided    She will follow up with me in 3-4 months, or sooner if needed.    All questions were answered, and the patient is in agreement with the plan.    Nba Celestin M.D.  [facility]  Department of Otorhinolaryngology and Communication Sciences

## 2024-10-21 PROBLEM — Z00.00 WELLNESS EXAMINATION: Status: RESOLVED | Noted: 2024-07-17 | Resolved: 2024-10-21

## 2024-10-24 ENCOUNTER — PATIENT MESSAGE (OUTPATIENT)
Dept: SURGERY | Facility: CLINIC | Age: 49
End: 2024-10-24
Payer: MEDICAID

## 2024-10-25 ENCOUNTER — LAB VISIT (OUTPATIENT)
Dept: LAB | Facility: HOSPITAL | Age: 49
End: 2024-10-25
Attending: STUDENT IN AN ORGANIZED HEALTH CARE EDUCATION/TRAINING PROGRAM
Payer: MEDICAID

## 2024-10-25 DIAGNOSIS — Z90.89 S/P PARATHYROIDECTOMY: ICD-10-CM

## 2024-10-25 DIAGNOSIS — Z98.890 S/P PARATHYROIDECTOMY: ICD-10-CM

## 2024-10-25 LAB
ALBUMIN SERPL BCP-MCNC: 3.7 G/DL (ref 3.5–5.2)
ANION GAP SERPL CALC-SCNC: 11 MMOL/L (ref 8–16)
BUN SERPL-MCNC: 8 MG/DL (ref 6–20)
CALCIUM SERPL-MCNC: 8.5 MG/DL (ref 8.7–10.5)
CHLORIDE SERPL-SCNC: 110 MMOL/L (ref 95–110)
CO2 SERPL-SCNC: 21 MMOL/L (ref 23–29)
CREAT SERPL-MCNC: 0.8 MG/DL (ref 0.5–1.4)
EST. GFR  (NO RACE VARIABLE): >60 ML/MIN/1.73 M^2
GLUCOSE SERPL-MCNC: 86 MG/DL (ref 70–110)
PHOSPHATE SERPL-MCNC: 3.1 MG/DL (ref 2.7–4.5)
POTASSIUM SERPL-SCNC: 4.3 MMOL/L (ref 3.5–5.1)
PTH-INTACT SERPL-MCNC: 220.2 PG/ML (ref 9–77)
SODIUM SERPL-SCNC: 142 MMOL/L (ref 136–145)
T4 FREE SERPL-MCNC: 0.84 NG/DL (ref 0.71–1.51)
TSH SERPL DL<=0.005 MIU/L-ACNC: 6.47 UIU/ML (ref 0.4–4)

## 2024-10-25 PROCEDURE — 83970 ASSAY OF PARATHORMONE: CPT | Performed by: STUDENT IN AN ORGANIZED HEALTH CARE EDUCATION/TRAINING PROGRAM

## 2024-10-25 PROCEDURE — 84443 ASSAY THYROID STIM HORMONE: CPT | Performed by: STUDENT IN AN ORGANIZED HEALTH CARE EDUCATION/TRAINING PROGRAM

## 2024-10-25 PROCEDURE — 84439 ASSAY OF FREE THYROXINE: CPT | Performed by: STUDENT IN AN ORGANIZED HEALTH CARE EDUCATION/TRAINING PROGRAM

## 2024-10-25 PROCEDURE — 36415 COLL VENOUS BLD VENIPUNCTURE: CPT | Mod: PO | Performed by: STUDENT IN AN ORGANIZED HEALTH CARE EDUCATION/TRAINING PROGRAM

## 2024-10-25 PROCEDURE — 80069 RENAL FUNCTION PANEL: CPT | Performed by: STUDENT IN AN ORGANIZED HEALTH CARE EDUCATION/TRAINING PROGRAM

## 2024-10-28 ENCOUNTER — PATIENT MESSAGE (OUTPATIENT)
Dept: SURGERY | Facility: CLINIC | Age: 49
End: 2024-10-28
Payer: MEDICAID

## 2024-10-28 DIAGNOSIS — Z90.89 S/P PARATHYROIDECTOMY: Primary | ICD-10-CM

## 2024-10-28 DIAGNOSIS — Z98.890 S/P PARATHYROIDECTOMY: Primary | ICD-10-CM

## 2024-10-28 RX ORDER — LEVOTHYROXINE SODIUM 75 UG/1
75 TABLET ORAL
Qty: 30 TABLET | Refills: 11 | Status: SHIPPED | OUTPATIENT
Start: 2024-10-28 | End: 2025-10-28

## 2024-11-04 RX ORDER — LEVOTHYROXINE SODIUM 25 UG/1
75 TABLET ORAL
Qty: 90 TABLET | Refills: 11 | Status: SHIPPED | OUTPATIENT
Start: 2024-11-04 | End: 2025-11-04

## 2024-11-05 ENCOUNTER — TELEPHONE (OUTPATIENT)
Dept: SURGERY | Facility: CLINIC | Age: 49
End: 2024-11-05
Payer: MEDICAID

## 2024-11-05 NOTE — TELEPHONE ENCOUNTER
----- Message from Kayla sent at 11/5/2024 12:53 PM CST -----  Regarding: pt advice  Contact: [phone]  Benji/MD[facility] calling schedule to be seen at [facility] Wednesday November 27 at 10:15. Will be seen at Endocrine w Dr. Irene. No action needed.       [phone]

## 2024-11-14 ENCOUNTER — PATIENT MESSAGE (OUTPATIENT)
Dept: ENDOCRINOLOGY | Facility: CLINIC | Age: 49
End: 2024-11-14
Payer: MEDICAID

## 2024-11-18 ENCOUNTER — TELEPHONE (OUTPATIENT)
Dept: HEMATOLOGY/ONCOLOGY | Facility: CLINIC | Age: 49
End: 2024-11-18
Payer: MEDICAID

## 2024-11-18 NOTE — TELEPHONE ENCOUNTER
Returned call to patient. She is upset and crying because she is having many issues getting to [facility] because of her insurance. Advised her that I would reach out to our providers here at the cancer center for further direction and to see if its possible for her to be seen by one of our providers to determine next steps. I will follow up with her and she verbalized understanding    ----- Message from Med Assistant Esparza sent at 11/14/2024  3:40 PM CST -----  Contact: PT    ----- Message -----  From: Jessica Souza  Sent: 11/14/2024   3:35 PM CST  To: Francisco Cash Staff    Type: Needs Medical Advice    Who Called: PT  Best Call Back Number: There are no phone numbers on file.  Additional  Information: PT  requesting a call back regarding if she can  schedule appt with Dr. Singh, she  has been diagnosed with cancer by another [facility] provider. Before going thru referral process   Please Advise- Thank you

## 2024-11-19 ENCOUNTER — TELEPHONE (OUTPATIENT)
Dept: HEMATOLOGY/ONCOLOGY | Facility: CLINIC | Age: 49
End: 2024-11-19
Payer: MEDICAID

## 2024-11-19 DIAGNOSIS — C75.0 PARATHYROID CARCINOMA: Primary | ICD-10-CM

## 2024-11-19 NOTE — NURSING
Followed up with patient after discussion with Dr. Singh and Dr. Squires. Dr. Singh recommends CT Neck/Chest. TEMPUS testing pending. Appointment scheduled with Dr. Singh and reviewed all appt info and locations. Patient verbalized understanding. Navigation to f/u after visit with Dr. Singh

## 2024-11-20 DIAGNOSIS — C75.0 PARATHYROID CARCINOMA: Primary | ICD-10-CM

## 2024-11-21 ENCOUNTER — HOSPITAL ENCOUNTER (OUTPATIENT)
Dept: RADIOLOGY | Facility: HOSPITAL | Age: 49
Discharge: HOME | End: 2024-11-21
Attending: STUDENT IN AN ORGANIZED HEALTH CARE EDUCATION/TRAINING PROGRAM
Payer: MEDICAID

## 2024-11-21 DIAGNOSIS — C75.0 PARATHYROID CARCINOMA: ICD-10-CM

## 2024-11-21 PROCEDURE — 71250 CT THORAX DX C-: CPT | Mod: TC

## 2024-11-21 PROCEDURE — 70491 CT SOFT TISSUE NECK W/DYE: CPT | Mod: TC

## 2024-11-21 PROCEDURE — 70491 CT SOFT TISSUE NECK W/DYE: CPT | Mod: 26,,, | Performed by: RADIOLOGY

## 2024-11-21 PROCEDURE — 25500020 PHARM REV CODE 255: Performed by: STUDENT IN AN ORGANIZED HEALTH CARE EDUCATION/TRAINING PROGRAM

## 2024-11-21 PROCEDURE — 71250 CT THORAX DX C-: CPT | Mod: 26,,, | Performed by: RADIOLOGY

## 2024-11-21 RX ADMIN — IOHEXOL 100 ML: 350 INJECTION, SOLUTION INTRAVENOUS at 08:11

## 2024-11-22 ENCOUNTER — TELEPHONE (OUTPATIENT)
Dept: HEMATOLOGY/ONCOLOGY | Facility: CLINIC | Age: 49
End: 2024-11-22
Payer: MEDICAID

## 2024-11-22 NOTE — NURSING
Returned call to patient. She wanted to let us know that she was able to get an appointment scheduled at [facility] for Wednesday and wanted to ask if it was okay that she kept her appt this Monday with Dr. Singh to get established.   Patient confirmed her appt on Monday with Dr. Singh, but expressed that whatever treatment plan is decided upon at [facility], is the treatment plan she will follow. She would like to return home to complete treatment closer to home though. I explained to her that our providers can follow their recommended treatment plan.

## 2024-11-22 NOTE — TELEPHONE ENCOUNTER
----- Message from SHAMIKA Mclaughlin sent at 11/22/2024  3:43 PM CST -----  Regarding: FW: Plan of care  Contact: [phone]    ----- Message -----  From: Gretchen Rosario  Sent: 11/22/2024   2:23 PM CST  To: Francisco Cash Staff  Subject: Plan of care                                     Calling to speak with provider/nurse regarding plan of care, appointment questions. Please call and discuss with patient as soon as possible.  [phone]

## 2024-12-04 ENCOUNTER — OFFICE VISIT (OUTPATIENT)
Dept: ENDOCRINOLOGY | Facility: CLINIC | Age: 49
End: 2024-12-04
Payer: MEDICAID

## 2024-12-04 DIAGNOSIS — C75.0 PARATHYROID CARCINOMA: Primary | ICD-10-CM

## 2024-12-04 DIAGNOSIS — E03.9 HYPOTHYROIDISM, UNSPECIFIED TYPE: ICD-10-CM

## 2024-12-04 PROCEDURE — 3044F HG A1C LEVEL LT 7.0%: CPT | Mod: CPTII,95,, | Performed by: INTERNAL MEDICINE

## 2024-12-04 PROCEDURE — 1159F MED LIST DOCD IN RCRD: CPT | Mod: CPTII,95,, | Performed by: INTERNAL MEDICINE

## 2024-12-04 PROCEDURE — 1160F RVW MEDS BY RX/DR IN RCRD: CPT | Mod: CPTII,95,, | Performed by: INTERNAL MEDICINE

## 2024-12-04 PROCEDURE — 4010F ACE/ARB THERAPY RXD/TAKEN: CPT | Mod: CPTII,95,, | Performed by: INTERNAL MEDICINE

## 2024-12-04 PROCEDURE — 99214 OFFICE O/P EST MOD 30 MIN: CPT | Mod: 95,,, | Performed by: INTERNAL MEDICINE

## 2024-12-04 NOTE — PROGRESS NOTES
The patient location is: [place]    Visit type: audiovisual    Face to Face time with patient: 28  28 minutes of total time spent on the encounter, which includes face to face time and non-face to face time preparing to see the patient (eg, review of tests), Obtaining and/or reviewing separately obtained history, Documenting clinical information in the electronic or other health record, Independently interpreting results (not separately reported) and communicating results to the patient/family/caregiver, or Care coordination (not separately reported).         Each patient to whom he or she provides medical services by telemedicine is:  (1) informed of the relationship between the physician and patient and the respective role of any other health care provider with respect to management of the patient; and (2) notified that he or she may decline to receive medical services by telemedicine and may withdraw from such care at any time.    Notes:     CHIEF COMPLAINT: Parathyroid Cancer  49 y.o. old being seen as a f/u. Had an elevated Ca 7/23/24.  Initially thought to have parathyroid adenoma.  During the operation September 24, 2024 discovered that she has parathyroid cancer.  Subsequently, repeat surgery was done on 06/20/2024.  See pathology below.  Went to [facility] last week. Scheduled to see medical oncologist Dec 18. Vit D found to be low. States placed on Ergocalciferol 50,000 units, but not exactly sure dosing. States placed on liquid Vitamin D due to difficulty swallowing after surgery. Has insomnia. Still having some joint pain. She is fatigued.         TUMOR    Tumor Site: Right superior parathyroid  Histologic Type: Parathyroid chief cell carcinoma  Tumor Size: Greatest dimension in Centimeters (cm):    Additional Dimension in Centimeters (cm):  Tumor Extent (specify structures or organs involved by tumor): Right superior parathyroid, right thyroid (on prior case [id number]), esophagus, right recurrent  laryngeal nerve  Mitotic Rate: 8 mitoses per 10 mm^2  Tumor Necrosis: Present    Percentage of Tumor Necrosis: 5%  Lymphovascular Invasion: Present  Treatment Effect: No known preoperative therapy    MARGINS    Margin Status: Tumor present at margin    Margin(s) Involved by Tumor: Anterior, medial, posterior    REGIONAL LYMPH NODES    Regional Ly mph Node Status: Regional lymph nodes present    All regional lymph nodes negative for tumor  Number of Lymph Node(s) Examined: Exact number: 1  Lymph Node Comment: Lymph Node(s) Examined: Right neck level VI (perithyroidal)    STAGE    Stage or Classification System: None applicable       PAST MEDICAL HISTORY/PAST SURGICAL HISTORY:  Reviewed in [facility]    SOCIAL HISTORY: Reviewed in [facility]    FAMILY HISTORY:  No known Ca disorders. No kidney stones. No known osteoporosis. No hip fractures.     MEDICATIONS/ALLERGIES: The patient's MedCard has been updated and reviewed.            PE:         Latest Reference Range & Units 10/25/24 15:33   Sodium 136 - 145 mmol/L 142   Potassium 3.5 - 5.1 mmol/L 4.3   Chloride 95 - 110 mmol/L 110   CO2 23 - 29 mmol/L 21 (L)   Anion Gap 8 - 16 mmol/L 11   BUN 6 - 20 mg/dL 8   Creatinine 0.5 - 1.4 mg/dL 0.8   eGFR >60 mL/min/1.73 m^2 >60.0   Glucose 70 - 110 mg/dL 86   Calcium 8.7 - 10.5 mg/dL 8.5 (L)   Phosphorus Level 2.7 - 4.5 mg/dL 3.1   Albumin 3.5 - 5.2 g/dL 3.7   TSH 0.400 - 4.000 uIU/mL 6.468 (H)   Free T4 0.71 - 1.51 ng/dL 0.84   PTH 9.0 - 77.0 pg/mL 220.2 (H)   (L): Data is abnormally low  (H): Data is abnormally high      11/27/24  .7  Vit D 19.   Ca 8.9.           ASSESSMENT/PLAN:  1. Parathyroid cancer-initially thought to be primary hyperparathyroidism.  However, when patient had surgery, discovered to be thyroid cancer.  She has had repeat surgery.  Scheduled to see oncology at [facility].  Has seen a surgical oncologist already.  She will message us once she has that visit to see if any further follow up needed or if she is  going to follow up at [facility].  PTH levels we will need to be monitored.  Discussed the parathyroid cancer is a rare cancer and good to go to a specialty Cancer Center.     2.  Hypothyroidism-check TSH levels.    FOLLOWUP  F/U 6 months

## 2024-12-13 ENCOUNTER — LAB VISIT (OUTPATIENT)
Dept: LAB | Facility: HOSPITAL | Age: 49
End: 2024-12-13
Attending: STUDENT IN AN ORGANIZED HEALTH CARE EDUCATION/TRAINING PROGRAM
Payer: MEDICAID

## 2024-12-13 DIAGNOSIS — Z98.890 S/P PARATHYROIDECTOMY: ICD-10-CM

## 2024-12-13 DIAGNOSIS — Z90.89 S/P PARATHYROIDECTOMY: ICD-10-CM

## 2024-12-13 LAB
T4 FREE SERPL-MCNC: 0.89 NG/DL (ref 0.71–1.51)
TSH SERPL DL<=0.005 MIU/L-ACNC: 4.18 UIU/ML (ref 0.4–4)

## 2024-12-13 PROCEDURE — 84443 ASSAY THYROID STIM HORMONE: CPT | Performed by: STUDENT IN AN ORGANIZED HEALTH CARE EDUCATION/TRAINING PROGRAM

## 2024-12-13 PROCEDURE — 36415 COLL VENOUS BLD VENIPUNCTURE: CPT | Mod: PO | Performed by: STUDENT IN AN ORGANIZED HEALTH CARE EDUCATION/TRAINING PROGRAM

## 2024-12-13 PROCEDURE — 84439 ASSAY OF FREE THYROXINE: CPT | Performed by: STUDENT IN AN ORGANIZED HEALTH CARE EDUCATION/TRAINING PROGRAM

## 2024-12-18 RX ORDER — LEVOTHYROXINE SODIUM 100 UG/1
100 TABLET ORAL
Qty: 30 TABLET | Refills: 11 | Status: SHIPPED | OUTPATIENT
Start: 2024-12-18 | End: 2025-12-18

## 2024-12-18 NOTE — PROGRESS NOTES
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    - TSH remains elevated on 75 mcg levothyroxine  - Increase levothyroxine to 100 mcg daily   - TSH in 6 weeks    Dae Staff - please coordinate TSH as above  Dr. Jake PERSAUD

## 2024-12-19 ENCOUNTER — PATIENT MESSAGE (OUTPATIENT)
Dept: SURGERY | Facility: CLINIC | Age: 49
End: 2024-12-19
Payer: MEDICAID

## 2024-12-19 DIAGNOSIS — C75.0 PARATHYROID CARCINOMA: ICD-10-CM

## 2024-12-19 DIAGNOSIS — Z98.890 S/P PARATHYROIDECTOMY: Primary | ICD-10-CM

## 2024-12-19 DIAGNOSIS — Z90.89 S/P PARATHYROIDECTOMY: Primary | ICD-10-CM

## 2024-12-24 DIAGNOSIS — C75.0 PARATHYROID CARCINOMA: ICD-10-CM

## 2024-12-24 DIAGNOSIS — Z90.89 S/P PARATHYROIDECTOMY: Primary | ICD-10-CM

## 2024-12-24 DIAGNOSIS — Z98.890 S/P PARATHYROIDECTOMY: Primary | ICD-10-CM

## 2024-12-28 NOTE — PROGRESS NOTES
Postoperative Endocrine Surgery Clinic Note    Reason for visit / Chief complaint: Postoperative evaluation  Endocrinologist: Vaibhav Joseph DO  Procedure:  Parathyroid Carcinoma Resection With Right Thyroid Lobectomy And Right Level Vi Compartment Dissection - Right, Rotational Sternothyroid Muscle Flap - Right, and Repair, Nerve Using Allograft - Right  Procedure Date: 9/26/2024    This visit was performed virtually in a private and secure setting utilizing the [employer] Virtual Visit portal equipped with voice and video.  The patient's identity was confirmed utilizing two different identifiers.  The conduct of a virtual visit was discussed with the patient, risks and benefits of virtual visits were explained including technical and equipment malfunction and the inability to have a complete physical exam.  All of the patient's questions were answered.  The patient expressed understanding of these risks and benefits and consented to complete today's visit virtually.  Virtual Visit consent was confirmed to be signed prior to the visit.       Subjective:     Virtual visit today to check in after her appointments with [facility] in [place], [place].  Reports voice remains strong, denies aspiration or significant voice fatigue.  She does have trouble with larger pills.  She has started liquid vitamin D weekly for vitamin D insufficiency.  We reviewed recommendations given at [facility] in [place].    Procedure:   Excision of parathyroid tumor en bloc with right recurrent laryngeal nerve, right thyroid lobectomy, and right level VI compartment dissection  Repair of right recurrent laryngeal nerve with cadaver allograft with Plastic Surgery   Rotational sternothyroid muscle flap with Plastic Surgery  Placement of 15 Fr round Gorge drain in right neck surgical bed  Placement of nasogastric tube with bridle   Intraoperative monitoring and interpretation of right cranial nerves (vagus and recurrent laryngeal nerves) using the  Neurovision Cobra EMG endotracheal tube and Medtronic NIM-Response 3.0 system     Operative findings:   Invasive parathyroid tumor arising from the right superior parathyroid gland involving the muscular layers of the esophagus, the posterior aspect of the larynx behind the cricothyroid joint, right thyroid lobe, surrounding fibroadipose tissue, and lymph nodes within right level VI compartment.  Right recurrent laryngeal nerve transected distally at the insertion point into the larynx and proximal to the tumor involvement.  Frozen sections of nerve negative for parathyroid tissue or malignancy.  Recurrent laryngeal nerve repaired with 2-3mm x 70mm Axogen Advance nerve allograft trimmed to appropriate length.  Bulging of the submucosal esophageal layer at the level of the tumor involvement distal to the larynx, buttressed with a rotational sternothyroid muscle flap.  The right recurrent laryngeal nerve signal was present in the central tumor and distally to the insertion point, the proximal recurrent laryngeal nerve signal was not present at the start of the case.      Current Outpatient Medications   Medication Sig Dispense Refill    albuterol (PROVENTIL/VENTOLIN HFA) 90 mcg/actuation inhaler Inhale 2 puffs into the lungs 2 (two) times daily as needed for Wheezing or Shortness of Breath. 18 g 1    gabapentin (NEURONTIN) 250 mg/5 mL solution Take 5 mLs (250 mg total) by mouth 3 (three) times daily. 210 mL 0    levothyroxine (SYNTHROID) 100 MCG tablet Take 1 tablet (100 mcg total) by mouth before breakfast. 30 tablet 11     No current facility-administered medications for this visit.     Review of patient's allergies indicates:   Allergen Reactions    Codeine     Penicillins Rash       Review of Systems  Negative except as per HPI.     Objective:   There were no vitals taken for this visit.    General: alert, well appearing  Neck: neck appears flat via video  Incision: appears well approximated  Neurological:  phonation is strong    Labs:  Lab Results   Component Value Date    TSH 4.184 (H) 12/13/2024             Pathology:  Final Pathologic Diagnosis   Date Value Ref Range Status   09/26/2024 (A)  Final    1. Nerve, right recurrent laryngeal, distal margin, excision:  - Benign nerve tissue  - Negative for malignancy    2. Nerve, right recurrent laryngeal, proximal margin, excision:  - Benign nerve tissue  - Negative for malignancy    3. Parathyroid tumor, esophagus, and right recurrent laryngeal nerve, en bloc excision:  - Parathyroid chief cell carcinoma  - Anterior, medial, and posterior margins are positive for tumor  - Lymphovascular invasion is identified  - Tumor necrosis is identified  - See synoptic report below    4. Thyroid, right lobe and isthmus, hemithyroidectomy:  - Thyroid with benign adenomatoid nodules  - Benign parathyroid tissue  - One lymph node, negative for metastatic carcinoma (0/1)    5. Lymph nodes, right neck, level VI, lymphadenectomy:- No lymph nodes identified (multiple additional levels examined)  - Negative for malignancy    _________________________________________________________  CASE SUMMARY: (GENERAL TUMOR RESECTION: Reporting Template)   May be used for any malignancy for which an appropriate organ-specific resection protocol is not available.    SPECIMEN    Procedure: En bloc excision of parathyroid tumor, right recurrent laryngeal nerve, right thyroid lobectomy, and right level VI compartment dissection  Specimen Laterality: Right    TUMOR    Tumor Site: Right superior parathyroid  Histologic Type: Parathyroid chief cell carcinoma  Tumor Size: Greatest dimension in Centimeters (cm):    Additional Dimension in Centimeters (cm):   Tumor Extent (specify structures or organs involved by tumor): Right superior parathyroid, right thyroid (on prior case [id number]), esophagus, right recurrent laryngeal nerve  Mitotic Rate: 8 mitoses per 10 mm^2  Tumor Necrosis: Present    Percentage of  Tumor Necrosis: 5%  Lymphovascular Invasion: Present  Treatment Effect: No known preoperative therapy    MARGINS    Margin Status: Tumor present at margin    Margin(s) Involved by Tumor: Anterior, medial, posterior    REGIONAL LYMPH NODES    Regional Lymph Node Status: Regional lymph nodes present    All regional lymph nodes negative for tumor  Number of Lymph Node(s) Examined: Exact number: 1  Lymph Node Comment: Lymph Node(s) Examined: Right neck level VI (perithyroidal)    STAGE    Stage or Classification System: None applicable    Selected Blocks for Possible Future Testing: 3-C, 3-D, 3-E, 3-F       Comment:     Interp By Carlos Castro D.O., Signed on 10/02/2024 at 09:40         Assessment and Plan     Problem List Items Addressed This Visit       Parathyroid carcinoma - Primary    Overview     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         Current Assessment & Plan     - Continue to follow with [facility] as well as Dr. Joseph locally (next visit in June)  - Offered assistance in ordering labs, imaging, or tests if she has difficulty with the paper orders provided by [facility]  - Continue levothyroxine  - Continue to follow with Dr. Celestin, next visit in February  - I have encouraged her to follow up with me  as needed and to reach out anytime if I can be of any assistance. Happy to follow locally if she prefers or has a plan of care set out by her physicians at [facility]         Vitamin D insufficiency    Current Assessment & Plan     19 in 11/2024.    - Weekly ergocalciferol started at [facility]  - Suggested adding [id number] IU over the counter vitamin D3 in addition to the weekly ergocalciferol  - Monitor vitamin D periodically          Zonia Quesada MD  Staff Surgeon  Endocrine Surgery  12/30/24

## 2024-12-30 ENCOUNTER — OFFICE VISIT (OUTPATIENT)
Dept: SURGERY | Facility: CLINIC | Age: 49
End: 2024-12-30
Payer: MEDICAID

## 2024-12-30 DIAGNOSIS — C75.0 PARATHYROID CARCINOMA: Primary | ICD-10-CM

## 2024-12-30 DIAGNOSIS — E55.9 VITAMIN D INSUFFICIENCY: ICD-10-CM

## 2024-12-30 PROCEDURE — 99499 UNLISTED E&M SERVICE: CPT | Mod: 95,,, | Performed by: STUDENT IN AN ORGANIZED HEALTH CARE EDUCATION/TRAINING PROGRAM

## 2024-12-30 NOTE — ASSESSMENT & PLAN NOTE
19 in 11/2024.    - Weekly ergocalciferol started at [facility]  - Suggested adding 3915-7882 IU over the counter vitamin D3 in addition to the weekly ergocalciferol  - Monitor vitamin D periodically

## 2024-12-30 NOTE — ASSESSMENT & PLAN NOTE
- Continue to follow with [facility] as well as Dr. Joseph locally (next visit in June)  - Offered assistance in ordering labs, imaging, or tests if she has difficulty with the paper orders provided by [facility]  - Continue levothyroxine  - Continue to follow with Dr. Celestin, next visit in February  - I have encouraged her to follow up with me as needed and to reach out anytime if I can be of any assistance. Happy to follow locally if she prefers or has a plan of care set out by her physicians at [facility]

## 2025-01-16 ENCOUNTER — TELEPHONE (OUTPATIENT)
Dept: SURGERY | Facility: CLINIC | Age: 50
End: 2025-01-16
Payer: MEDICAID

## 2025-01-30 ENCOUNTER — OFFICE VISIT (OUTPATIENT)
Dept: SURGERY | Facility: CLINIC | Age: 50
End: 2025-01-30
Payer: MEDICAID

## 2025-01-30 ENCOUNTER — PATIENT MESSAGE (OUTPATIENT)
Dept: SURGERY | Facility: CLINIC | Age: 50
End: 2025-01-30

## 2025-01-30 VITALS — HEIGHT: 67 IN | BODY MASS INDEX: 45.99 KG/M2 | WEIGHT: 293 LBS

## 2025-01-30 DIAGNOSIS — C75.0 PARATHYROID CARCINOMA: Primary | ICD-10-CM

## 2025-01-30 DIAGNOSIS — R21 RASH: ICD-10-CM

## 2025-01-30 DIAGNOSIS — E55.9 VITAMIN D INSUFFICIENCY: ICD-10-CM

## 2025-01-30 DIAGNOSIS — M25.50 ARTHRALGIA, UNSPECIFIED JOINT: ICD-10-CM

## 2025-01-30 DIAGNOSIS — R73.03 PREDIABETES: ICD-10-CM

## 2025-01-30 PROCEDURE — 1159F MED LIST DOCD IN RCRD: CPT | Mod: CPTII,95,, | Performed by: STUDENT IN AN ORGANIZED HEALTH CARE EDUCATION/TRAINING PROGRAM

## 2025-01-30 PROCEDURE — 3008F BODY MASS INDEX DOCD: CPT | Mod: CPTII,95,, | Performed by: STUDENT IN AN ORGANIZED HEALTH CARE EDUCATION/TRAINING PROGRAM

## 2025-01-30 PROCEDURE — 98005 SYNCH AUDIO-VIDEO EST LOW 20: CPT | Mod: 95,,, | Performed by: STUDENT IN AN ORGANIZED HEALTH CARE EDUCATION/TRAINING PROGRAM

## 2025-01-30 RX ORDER — VIT C/E/ZN/COPPR/LUTEIN/ZEAXAN 250MG-90MG
5000 CAPSULE ORAL DAILY
COMMUNITY
Start: 2025-01-30

## 2025-01-30 NOTE — ASSESSMENT & PLAN NOTE
- Add labs q8 weeks x6: albumin, BMP, magnesium, phosphorus, vitamin D, PTH, TSH - will CC Dr. Chavis at Fax: [phone]  - Following with Dr. Reynold Chavis at [facility]  - Following up with Dr. Jake wyatt

## 2025-01-30 NOTE — PROGRESS NOTES
Postoperative Endocrine Surgery Clinic Note    Reason for visit / Chief complaint: Postoperative evaluation  Endocrinologist: Vaibhav Joseph DO  Procedure:  Parathyroid Carcinoma Resection With Right Thyroid Lobectomy And Right Level Vi Compartment Dissection - Right, Rotational Sternothyroid Muscle Flap - Right, and Repair, Nerve Using Allograft - Right  Procedure Date: 9/26/2024    This visit was performed virtually in a private and secure setting utilizing the [employer] Virtual Visit portal equipped with voice and video.  The patient's identity was confirmed utilizing two different identifiers.  The conduct of a virtual visit was discussed with the patient, risks and benefits of virtual visits were explained including technical and equipment malfunction and the inability to have a complete physical exam.  All of the patient's questions were answered.  The patient expressed understanding of these risks and benefits and consented to complete today's visit virtually.  Virtual Visit consent was confirmed to be signed prior to the visit.       Subjective:     HPI 12/30/2024: Virtual visit today to check in after her appointments with [facility] in [place], [place].  Reports voice remains strong, denies aspiration or significant voice fatigue.  She does have trouble with larger pills.  She has started liquid vitamin D weekly for vitamin D insufficiency.  We reviewed recommendations given at [facility] in [place].    Interval 1/30/2025:    Procedure:   Excision of parathyroid tumor en bloc with right recurrent laryngeal nerve, right thyroid lobectomy, and right level VI compartment dissection  Repair of right recurrent laryngeal nerve with cadaver allograft with Plastic Surgery   Rotational sternothyroid muscle flap with Plastic Surgery  Placement of 15 Fr round Gorge drain in right neck surgical bed  Placement of nasogastric tube with bridle   Intraoperative monitoring and interpretation of right cranial nerves (vagus and  recurrent laryngeal nerves) using the Neurovision Cobra EMG endotracheal tube and [employer]-Response 3.0 system     Operative findings:   Invasive parathyroid tumor arising from the right superior parathyroid gland involving the muscular layers of the esophagus, the posterior aspect of the larynx behind the cricothyroid joint, right thyroid lobe, surrounding fibroadipose tissue, and lymph nodes within right level VI compartment.  Right recurrent laryngeal nerve transected distally at the insertion point into the larynx and proximal to the tumor involvement.  Frozen sections of nerve negative for parathyroid tissue or malignancy.  Recurrent laryngeal nerve repaired with 2-3mm x 70mm Axogen Advance nerve allograft trimmed to appropriate length.  Bulging of the submucosal esophageal layer at the level of the tumor involvement distal to the larynx, buttressed with a rotational sternothyroid muscle flap.  The right recurrent laryngeal nerve signal was present in the central tumor and distally to the insertion point, the proximal recurrent laryngeal nerve signal was not present at the start of the case.      Current Outpatient Medications   Medication Sig Dispense Refill    albuterol (PROVENTIL/VENTOLIN HFA) 90 mcg/actuation inhaler Inhale 2 puffs into the lungs 2 (two) times daily as needed for Wheezing or Shortness of Breath. 18 g 1    gabapentin (NEURONTIN) 250 mg/5 mL solution Take 5 mLs (250 mg total) by mouth 3 (three) times daily. 210 mL 0    levothyroxine (SYNTHROID) 100 MCG tablet Take 1 tablet (100 mcg total) by mouth before breakfast. 30 tablet 11     No current facility-administered medications for this visit.     Review of patient's allergies indicates:   Allergen Reactions    Codeine     Penicillins Rash       Review of Systems  Negative except as per HPI.     Objective:   There were no vitals taken for this visit.    General: alert, well appearing  Neck: neck appears flat via video  Incision: appears  well approximated  Neurological: phonation is strong    Labs:  Lab Results   Component Value Date    TSH 4.184 (H) 12/13/2024             Pathology:  Final Pathologic Diagnosis   Date Value Ref Range Status   09/26/2024 (A)  Final    1. Nerve, right recurrent laryngeal, distal margin, excision:  - Benign nerve tissue  - Negative for malignancy    2. Nerve, right recurrent laryngeal, proximal margin, excision:  - Benign nerve tissue  - Negative for malignancy    3. Parathyroid tumor, esophagus, and right recurrent laryngeal nerve, en bloc excision:  - Parathyroid chief cell carcinoma  - Anterior, medial, and posterior margins are positive for tumor  - Lymphovascular invasion is identified  - Tumor necrosis is identified  - See synoptic report below    4. Thyroid, right lobe and isthmus, hemithyroidectomy:  - Thyroid with benign adenomatoid nodules  - Benign parathyroid tissue  - One lymph node, negative for metastatic carcinoma (0/1)    5. Lymph nodes, right neck, level VI, lymphadenectomy:- No lymph nodes identified (multiple additional levels examined)  - Negative for malignancy    _________________________________________________________  CASE SUMMARY: (GENERAL TUMOR RESECTION: Reporting Template)   May be used for any malignancy for which an appropriate organ-specific resection protocol is not available.    SPECIMEN    Procedure: En bloc excision of parathyroid tumor, right recurrent laryngeal nerve, right thyroid lobectomy, and right level VI compartment dissection  Specimen Laterality: Right    TUMOR    Tumor Site: Right superior parathyroid  Histologic Type: Parathyroid chief cell carcinoma  Tumor Size: Greatest dimension in Centimeters (cm):    Additional Dimension in Centimeters (cm):   Tumor Extent (specify structures or organs involved by tumor): Right superior parathyroid, right thyroid (on prior case [id number]), esophagus, right recurrent laryngeal nerve  Mitotic Rate: 8 mitoses per 10 mm^2  Tumor  Necrosis: Present    Percentage of Tumor Necrosis: 5%  Lymphovascular Invasion: Present  Treatment Effect: No known preoperative therapy    MARGINS    Margin Status: Tumor present at margin    Margin(s) Involved by Tumor: Anterior, medial, posterior    REGIONAL LYMPH NODES    Regional Lymph Node Status: Regional lymph nodes present    All regional lymph nodes negative for tumor  Number of Lymph Node(s) Examined: Exact number: 1  Lymph Node Comment: Lymph Node(s) Examined: Right neck level VI (perithyroidal)    STAGE    Stage or Classification System: None applicable    Selected Blocks for Possible Future Testing: 3-C, 3-D, 3-E, 3-F       Comment:     Interp By Carlos Castro D.O., Signed on 10/02/2024 at 09:40         Assessment and Plan     Parathyroid carcinoma  Assessment & Plan:  - Add labs q8 weeks x6: albumin, BMP, magnesium, phosphorus, vitamin D, PTH, TSH - will CC Dr. Chavis at Fax: [phone]  - Following with Dr. Reynold [facility]avis at [facility]  - Following up with Dr. Joseph locally    Orders:  -     Albumin; Future; Expected date: 01/30/2025  -     Basic Metabolic Panel; Future; Expected date: 01/30/2025  -     Magnesium; Future; Expected date: 01/30/2025  -     PHOSPHORUS; Future; Expected date: 01/30/2025  -     Vitamin D; Future; Expected date: 01/30/2025  -     PTH, intact; Future; Expected date: 01/30/2025  -     TSH; Future; Expected date: 01/30/2025    Vitamin D insufficiency  Assessment & Plan:  19 in 11/2024.     - Change to 6099-9266 IU over the counter vitamin D3 daily  - Discontinue liquid vitamin D      Prediabetes  Assessment & Plan:  - Interested in GLP-1 injection  - Will reach out to endocrinology      Rash  -     Ambulatory referral/consult to Dermatology; Future; Expected date: 02/06/2025    Arthralgia, unspecified joint  -     Ambulatory referral/consult to Rheumatology; Future; Expected date: 02/06/2025    Other orders  -     cholecalciferol, vitamin D3, (VITAMIN D3) 25  mcg (1,000 unit) capsule; Take 5 capsules (5,000 Units total) by mouth once daily.    Zonia Quesada MD, FACS  Staff Surgeon  Endocrine Surgery  1/30/25

## 2025-01-30 NOTE — ASSESSMENT & PLAN NOTE
19 in 11/2024.     - Change to 9104-0216 IU over the counter vitamin D3 daily  - Discontinue liquid vitamin D

## 2025-01-31 ENCOUNTER — LAB VISIT (OUTPATIENT)
Dept: LAB | Facility: HOSPITAL | Age: 50
End: 2025-01-31
Attending: STUDENT IN AN ORGANIZED HEALTH CARE EDUCATION/TRAINING PROGRAM
Payer: MEDICAID

## 2025-01-31 DIAGNOSIS — Z98.890 S/P PARATHYROIDECTOMY: ICD-10-CM

## 2025-01-31 DIAGNOSIS — Z90.89 S/P PARATHYROIDECTOMY: ICD-10-CM

## 2025-01-31 DIAGNOSIS — C75.0 PARATHYROID CARCINOMA: ICD-10-CM

## 2025-01-31 LAB
ALBUMIN SERPL BCP-MCNC: 3.9 G/DL (ref 3.5–5.2)
ANION GAP SERPL CALC-SCNC: 10 MMOL/L (ref 8–16)
BUN SERPL-MCNC: 12 MG/DL (ref 6–20)
CALCIUM SERPL-MCNC: 9.2 MG/DL (ref 8.7–10.5)
CHLORIDE SERPL-SCNC: 107 MMOL/L (ref 95–110)
CO2 SERPL-SCNC: 23 MMOL/L (ref 23–29)
CREAT SERPL-MCNC: 0.9 MG/DL (ref 0.5–1.4)
EST. GFR  (NO RACE VARIABLE): >60 ML/MIN/1.73 M^2
GLUCOSE SERPL-MCNC: 94 MG/DL (ref 70–110)
MAGNESIUM SERPL-MCNC: 1.9 MG/DL (ref 1.6–2.6)
PHOSPHATE SERPL-MCNC: 3.4 MG/DL (ref 2.7–4.5)
POTASSIUM SERPL-SCNC: 4 MMOL/L (ref 3.5–5.1)
SODIUM SERPL-SCNC: 140 MMOL/L (ref 136–145)
TSH SERPL DL<=0.005 MIU/L-ACNC: 2.88 UIU/ML (ref 0.4–4)
TSH SERPL DL<=0.005 MIU/L-ACNC: 2.88 UIU/ML (ref 0.4–4)

## 2025-01-31 PROCEDURE — 36415 COLL VENOUS BLD VENIPUNCTURE: CPT | Mod: PO | Performed by: STUDENT IN AN ORGANIZED HEALTH CARE EDUCATION/TRAINING PROGRAM

## 2025-01-31 PROCEDURE — 82306 VITAMIN D 25 HYDROXY: CPT | Performed by: STUDENT IN AN ORGANIZED HEALTH CARE EDUCATION/TRAINING PROGRAM

## 2025-01-31 PROCEDURE — 83735 ASSAY OF MAGNESIUM: CPT | Performed by: STUDENT IN AN ORGANIZED HEALTH CARE EDUCATION/TRAINING PROGRAM

## 2025-01-31 PROCEDURE — 80048 BASIC METABOLIC PNL TOTAL CA: CPT | Performed by: STUDENT IN AN ORGANIZED HEALTH CARE EDUCATION/TRAINING PROGRAM

## 2025-01-31 PROCEDURE — 84100 ASSAY OF PHOSPHORUS: CPT | Performed by: STUDENT IN AN ORGANIZED HEALTH CARE EDUCATION/TRAINING PROGRAM

## 2025-01-31 PROCEDURE — 82040 ASSAY OF SERUM ALBUMIN: CPT | Performed by: STUDENT IN AN ORGANIZED HEALTH CARE EDUCATION/TRAINING PROGRAM

## 2025-01-31 PROCEDURE — 84443 ASSAY THYROID STIM HORMONE: CPT | Performed by: STUDENT IN AN ORGANIZED HEALTH CARE EDUCATION/TRAINING PROGRAM

## 2025-01-31 PROCEDURE — 83970 ASSAY OF PARATHORMONE: CPT | Performed by: STUDENT IN AN ORGANIZED HEALTH CARE EDUCATION/TRAINING PROGRAM

## 2025-02-01 LAB
25(OH)D3+25(OH)D2 SERPL-MCNC: 23 NG/ML (ref 30–96)
PTH-INTACT SERPL-MCNC: 55.1 PG/ML (ref 9–77)

## 2025-02-03 NOTE — PROGRESS NOTES
Ordered per [facility], results to be faxed to:    Reynold Chavis MD   [address] [place] [address]   Phone: tel:[phone]   fax:[phone]

## 2025-02-27 ENCOUNTER — PATIENT MESSAGE (OUTPATIENT)
Dept: HEMATOLOGY/ONCOLOGY | Facility: CLINIC | Age: 50
End: 2025-02-27
Payer: MEDICAID

## 2025-03-12 ENCOUNTER — PATIENT MESSAGE (OUTPATIENT)
Dept: SURGERY | Facility: CLINIC | Age: 50
End: 2025-03-12
Payer: MEDICAID

## 2025-03-13 DIAGNOSIS — C75.0 PARATHYROID CARCINOMA: Primary | ICD-10-CM

## 2025-04-02 ENCOUNTER — OFFICE VISIT (OUTPATIENT)
Dept: OTOLARYNGOLOGY | Facility: CLINIC | Age: 50
End: 2025-04-02
Payer: MEDICAID

## 2025-04-02 ENCOUNTER — LAB VISIT (OUTPATIENT)
Dept: LAB | Facility: HOSPITAL | Age: 50
End: 2025-04-02
Attending: STUDENT IN AN ORGANIZED HEALTH CARE EDUCATION/TRAINING PROGRAM
Payer: MEDICAID

## 2025-04-02 VITALS — HEART RATE: 81 BPM | SYSTOLIC BLOOD PRESSURE: 150 MMHG | DIASTOLIC BLOOD PRESSURE: 90 MMHG

## 2025-04-02 DIAGNOSIS — R49.0 DYSPHONIA: ICD-10-CM

## 2025-04-02 DIAGNOSIS — C75.0 PARATHYROID CARCINOMA: ICD-10-CM

## 2025-04-02 DIAGNOSIS — J38.01 UNILATERAL COMPLETE VOCAL FOLD PARALYSIS: Primary | ICD-10-CM

## 2025-04-02 LAB
25(OH)D3+25(OH)D2 SERPL-MCNC: 39 NG/ML (ref 30–96)
ALBUMIN SERPL BCP-MCNC: 3.7 G/DL (ref 3.5–5.2)
ANION GAP (OHS): 6 MMOL/L (ref 8–16)
BUN SERPL-MCNC: 11 MG/DL (ref 6–20)
CALCIUM SERPL-MCNC: 9.4 MG/DL (ref 8.7–10.5)
CHLORIDE SERPL-SCNC: 109 MMOL/L (ref 95–110)
CO2 SERPL-SCNC: 24 MMOL/L (ref 23–29)
CREAT SERPL-MCNC: 0.9 MG/DL (ref 0.5–1.4)
GFR SERPLBLD CREATININE-BSD FMLA CKD-EPI: >60 ML/MIN/1.73/M2
GLUCOSE SERPL-MCNC: 94 MG/DL (ref 70–110)
MAGNESIUM SERPL-MCNC: 2.1 MG/DL (ref 1.6–2.6)
PHOSPHATE SERPL-MCNC: 3 MG/DL (ref 2.7–4.5)
POTASSIUM SERPL-SCNC: 4.4 MMOL/L (ref 3.5–5.1)
PTH-INTACT SERPL-MCNC: 60.4 PG/ML (ref 9–77)
SODIUM SERPL-SCNC: 139 MMOL/L (ref 136–145)
TSH SERPL-ACNC: 2.74 UIU/ML (ref 0.4–4)

## 2025-04-02 PROCEDURE — 82306 VITAMIN D 25 HYDROXY: CPT

## 2025-04-02 PROCEDURE — 80069 RENAL FUNCTION PANEL: CPT

## 2025-04-02 PROCEDURE — 99213 OFFICE O/P EST LOW 20 MIN: CPT | Mod: PBBFAC | Performed by: OTOLARYNGOLOGY

## 2025-04-02 PROCEDURE — 31579 LARYNGOSCOPY TELESCOPIC: CPT | Mod: PBBFAC | Performed by: OTOLARYNGOLOGY

## 2025-04-02 PROCEDURE — 83970 ASSAY OF PARATHORMONE: CPT

## 2025-04-02 PROCEDURE — 3080F DIAST BP >= 90 MM HG: CPT | Mod: CPTII,,, | Performed by: OTOLARYNGOLOGY

## 2025-04-02 PROCEDURE — 1159F MED LIST DOCD IN RCRD: CPT | Mod: CPTII,,, | Performed by: OTOLARYNGOLOGY

## 2025-04-02 PROCEDURE — 99999 PR PBB SHADOW E&M-EST. PATIENT-LVL III: CPT | Mod: PBBFAC,,, | Performed by: OTOLARYNGOLOGY

## 2025-04-02 PROCEDURE — 83735 ASSAY OF MAGNESIUM: CPT

## 2025-04-02 PROCEDURE — 84443 ASSAY THYROID STIM HORMONE: CPT

## 2025-04-02 PROCEDURE — 99213 OFFICE O/P EST LOW 20 MIN: CPT | Mod: 25,S$PBB,, | Performed by: OTOLARYNGOLOGY

## 2025-04-02 PROCEDURE — 36415 COLL VENOUS BLD VENIPUNCTURE: CPT

## 2025-04-02 PROCEDURE — 31579 LARYNGOSCOPY TELESCOPIC: CPT | Mod: S$PBB,,, | Performed by: OTOLARYNGOLOGY

## 2025-04-02 PROCEDURE — 3077F SYST BP >= 140 MM HG: CPT | Mod: CPTII,,, | Performed by: OTOLARYNGOLOGY

## 2025-04-02 RX ORDER — LORAZEPAM 1 MG/1
1 TABLET ORAL
COMMUNITY
Start: 2025-02-20

## 2025-04-02 NOTE — PROGRESS NOTES
[facility]  Department of Otorhinolaryngology and Communication Sciences    Subjective:      Erika Jenkins is a 50 y.o. female who presents for follow-up. She has right vocal fold paralysis following oncologic parathyroid surgery, with primary nerve graft repair.    She is doing well since her last visit.  She is tolerating an unrestricted diet p.o..  Her voice remains great, essentially at baseline.  She is pleased with her progress and with her current status.    The patient's medications, allergies, past medical, surgical, social and family histories were reviewed and updated as appropriate.    A detailed review of systems was obtained with pertinent positives as per the above HPI, and otherwise negative.      Objective:     BP (!) 150/90 (BP Location: Right arm, Patient Position: Sitting)   Pulse 81      Constitutional: comfortable, well dressed  Psychiatric: appropriate affect  Respiratory: comfortably breathing, symmetric chest rise, no stridor  Voice:  Normal  Head: normocephalic  Eyes: conjunctivae and sclerae clear  Indirect laryngoscopy is limited due to gag    Procedure  Flexible Laryngeal Videostroboscopy (44477): Laryngeal videostroboscopy is indicated to assess laryngeal vibratory biomechanics and vocal fold oscillation, which cannot be assessed with a plain light examination. This was carried out transnasally with a distal chip videoendoscope. After verbal consent was obtained, the patient was positioned and the nose was topically decongested with 1% phenylephrine and topically anesthetized with 4% lidocaine. The endoscope was passed through the most patent nasal cavity and positioned to image the nasopharynx, larynx, and hypopharynx in detail. The following features were examined: nasopharyngeal, laryngeal, hypopharyngeal masses; velopharyngeal strength, closure, and symmetry of motion; vocal fold range and symmetry of motion; laryngeal mucosal edema, erythema, inflammation, and hydration;  salivary pooling; and gross laryngeal sensation. During phonation, the vocal folds were assessed for glottal closure; mucosal wave; vocal fold lesions; vibratory periodicity, amplitude, and phase symmetry; and vertical height match. The equipment was removed. The patient tolerated the procedure well without complication. All findings were normal except:  - right vocal fold immobile, paramedian, favorable bulk/tone  - complete closure, pliability intact      Assessment:     Erika Jenkins is a 50 y.o. female with right vocal fold paralysis following oncologic parathyroid surgery, with primary repair via nerve graft in 9/2024, currently very well compensated     Plan:     Reassurance was provided.  I recommended follow up in approximately 4-6 months.  Nevertheless, I am optimistic that she will maintain her present level of functional compensation, although I do not expect her vocal fold to recover motion.    I counseled her that I am moving out of state in the beginning of June.  As such, we will make sure she knows of departmental colleagues with whom she can continue to follow.    All questions were answered, and the patient is in agreement with the plan.    Nba Celestin M.D.  [facility]  Department of Otorhinolaryngology and Communication Sciences

## 2025-04-03 ENCOUNTER — RESULTS FOLLOW-UP (OUTPATIENT)
Dept: SURGERY | Facility: CLINIC | Age: 50
End: 2025-04-03

## 2025-04-08 ENCOUNTER — PATIENT MESSAGE (OUTPATIENT)
Dept: OTOLARYNGOLOGY | Facility: CLINIC | Age: 50
End: 2025-04-08
Payer: MEDICAID

## 2025-04-21 ENCOUNTER — TELEPHONE (OUTPATIENT)
Dept: PODIATRY | Facility: CLINIC | Age: 50
End: 2025-04-21
Payer: MEDICAID

## 2025-04-21 NOTE — TELEPHONE ENCOUNTER
Called and spoke to patient. Informed patient we have nothing to do with billing/insurance and to call [facility] billing dept. (Provided phone number) and to also contact her insurance. Patient was frustrated but understood.

## 2025-04-21 NOTE — TELEPHONE ENCOUNTER
----- Message from Yaneth sent at 4/21/2025 11:26 AM CDT -----  Type:  Needs Medical AdviceWho Called: GALINA Marquez [[id number]]Would the patient rather a call back or a response via [facility] [employer] [facility]? Call back Best Call Back Number: [phone] Additional Information: fpt requesting a call back in regards to bill received from two years ago   General

## 2025-05-02 ENCOUNTER — PATIENT MESSAGE (OUTPATIENT)
Dept: SURGERY | Facility: CLINIC | Age: 50
End: 2025-05-02
Payer: MEDICAID

## 2025-05-22 ENCOUNTER — PATIENT MESSAGE (OUTPATIENT)
Dept: SURGERY | Facility: CLINIC | Age: 50
End: 2025-05-22
Payer: MEDICAID

## 2025-06-03 ENCOUNTER — PATIENT MESSAGE (OUTPATIENT)
Dept: OBSTETRICS AND GYNECOLOGY | Facility: CLINIC | Age: 50
End: 2025-06-03
Payer: MEDICAID

## 2025-06-03 ENCOUNTER — PATIENT MESSAGE (OUTPATIENT)
Dept: ENDOCRINOLOGY | Facility: CLINIC | Age: 50
End: 2025-06-03
Payer: MEDICAID

## 2025-06-06 ENCOUNTER — LAB VISIT (OUTPATIENT)
Dept: LAB | Facility: HOSPITAL | Age: 50
End: 2025-06-06
Attending: STUDENT IN AN ORGANIZED HEALTH CARE EDUCATION/TRAINING PROGRAM
Payer: MEDICAID

## 2025-06-06 DIAGNOSIS — C75.0 PARATHYROID CARCINOMA: ICD-10-CM

## 2025-06-06 PROBLEM — R09.89 LABILE HYPERTENSION: Status: ACTIVE | Noted: 2025-06-06

## 2025-06-06 PROBLEM — R73.09 LABILE BLOOD GLUCOSE: Status: ACTIVE | Noted: 2024-07-17

## 2025-06-06 PROBLEM — Z01.818 PREOP EXAMINATION: Status: ACTIVE | Noted: 2025-06-06

## 2025-06-06 LAB
25(OH)D3+25(OH)D2 SERPL-MCNC: 45 NG/ML (ref 30–96)
ALBUMIN SERPL BCP-MCNC: 4 G/DL (ref 3.5–5.2)
ANION GAP (OHS): 8 MMOL/L (ref 8–16)
BUN SERPL-MCNC: 12 MG/DL (ref 6–20)
CALCIUM SERPL-MCNC: 9.1 MG/DL (ref 8.7–10.5)
CHLORIDE SERPL-SCNC: 107 MMOL/L (ref 95–110)
CO2 SERPL-SCNC: 27 MMOL/L (ref 23–29)
CREAT SERPL-MCNC: 1 MG/DL (ref 0.5–1.4)
GFR SERPLBLD CREATININE-BSD FMLA CKD-EPI: >60 ML/MIN/1.73/M2
GLUCOSE SERPL-MCNC: 94 MG/DL (ref 70–110)
MAGNESIUM SERPL-MCNC: 2 MG/DL (ref 1.6–2.6)
PHOSPHATE SERPL-MCNC: 3.2 MG/DL (ref 2.7–4.5)
POTASSIUM SERPL-SCNC: 4.2 MMOL/L (ref 3.5–5.1)
SODIUM SERPL-SCNC: 142 MMOL/L (ref 136–145)
TSH SERPL-ACNC: 2.17 UIU/ML (ref 0.4–4)

## 2025-06-06 PROCEDURE — 80069 RENAL FUNCTION PANEL: CPT

## 2025-06-06 PROCEDURE — 83735 ASSAY OF MAGNESIUM: CPT

## 2025-06-06 PROCEDURE — 84443 ASSAY THYROID STIM HORMONE: CPT

## 2025-06-06 PROCEDURE — 82306 VITAMIN D 25 HYDROXY: CPT

## 2025-06-06 PROCEDURE — 83970 ASSAY OF PARATHORMONE: CPT

## 2025-06-06 PROCEDURE — 36415 COLL VENOUS BLD VENIPUNCTURE: CPT | Mod: PO

## 2025-06-09 LAB — PTH-INTACT SERPL-MCNC: 40.7 PG/ML (ref 9–77)

## 2025-06-10 ENCOUNTER — RESULTS FOLLOW-UP (OUTPATIENT)
Dept: SURGERY | Facility: CLINIC | Age: 50
End: 2025-06-10

## 2025-06-17 ENCOUNTER — PATIENT MESSAGE (OUTPATIENT)
Dept: OBSTETRICS AND GYNECOLOGY | Facility: CLINIC | Age: 50
End: 2025-06-17
Payer: MEDICAID

## 2025-07-09 ENCOUNTER — OFFICE VISIT (OUTPATIENT)
Dept: OBSTETRICS AND GYNECOLOGY | Facility: CLINIC | Age: 50
End: 2025-07-09
Payer: MEDICAID

## 2025-07-09 ENCOUNTER — PATIENT MESSAGE (OUTPATIENT)
Dept: OBSTETRICS AND GYNECOLOGY | Facility: CLINIC | Age: 50
End: 2025-07-09
Payer: MEDICAID

## 2025-07-09 VITALS — WEIGHT: 278 LBS | DIASTOLIC BLOOD PRESSURE: 84 MMHG | SYSTOLIC BLOOD PRESSURE: 178 MMHG | BODY MASS INDEX: 43.54 KG/M2

## 2025-07-09 DIAGNOSIS — N93.9 ABNORMAL UTERINE BLEEDING: Primary | ICD-10-CM

## 2025-07-09 LAB
B-HCG UR QL: NEGATIVE
CTP QC/QA: YES

## 2025-07-09 PROCEDURE — 3008F BODY MASS INDEX DOCD: CPT | Mod: CPTII,,, | Performed by: OBSTETRICS & GYNECOLOGY

## 2025-07-09 PROCEDURE — 3077F SYST BP >= 140 MM HG: CPT | Mod: CPTII,,, | Performed by: OBSTETRICS & GYNECOLOGY

## 2025-07-09 PROCEDURE — 99999PBSHW PR PBB SHADOW TECHNICAL ONLY FILED TO HB: Mod: PBBFAC,,,

## 2025-07-09 PROCEDURE — 99999 PR PBB SHADOW E&M-EST. PATIENT-LVL III: CPT | Mod: PBBFAC,,, | Performed by: OBSTETRICS & GYNECOLOGY

## 2025-07-09 PROCEDURE — 99999PBSHW POCT URINE PREGNANCY: Mod: PBBFAC,,,

## 2025-07-09 PROCEDURE — 99214 OFFICE O/P EST MOD 30 MIN: CPT | Mod: S$PBB,,, | Performed by: OBSTETRICS & GYNECOLOGY

## 2025-07-09 PROCEDURE — 1159F MED LIST DOCD IN RCRD: CPT | Mod: CPTII,,, | Performed by: OBSTETRICS & GYNECOLOGY

## 2025-07-09 PROCEDURE — 1160F RVW MEDS BY RX/DR IN RCRD: CPT | Mod: CPTII,,, | Performed by: OBSTETRICS & GYNECOLOGY

## 2025-07-09 PROCEDURE — 96372 THER/PROPH/DIAG INJ SC/IM: CPT | Mod: PBBFAC,PN

## 2025-07-09 PROCEDURE — 99213 OFFICE O/P EST LOW 20 MIN: CPT | Mod: PBBFAC,PN,25 | Performed by: OBSTETRICS & GYNECOLOGY

## 2025-07-09 PROCEDURE — 81025 URINE PREGNANCY TEST: CPT | Mod: PBBFAC,PN | Performed by: OBSTETRICS & GYNECOLOGY

## 2025-07-09 PROCEDURE — 3079F DIAST BP 80-89 MM HG: CPT | Mod: CPTII,,, | Performed by: OBSTETRICS & GYNECOLOGY

## 2025-07-09 RX ORDER — MEDROXYPROGESTERONE ACETATE 150 MG/ML
150 INJECTION, SUSPENSION INTRAMUSCULAR
Status: COMPLETED | OUTPATIENT
Start: 2025-07-09 | End: 2025-07-09

## 2025-07-09 RX ORDER — ADALIMUMAB 4 MG/ML
KIT INJECTION
COMMUNITY
Start: 2025-07-02

## 2025-07-09 RX ADMIN — MEDROXYPROGESTERONE ACETATE 150 MG: 150 INJECTION, SUSPENSION INTRAMUSCULAR at 04:07

## 2025-07-09 NOTE — PROGRESS NOTES
Chief Complaint   Patient presents with    Vaginal Bleeding     C/o diagnosed with parathyroid cancer in 2024. Had brief menses end of  x 1 week, stopped then started again yesterday. Today very heavy with lots of small eraser sized clots. Yesterday nausea with cramping/back pain.        History and Physical:  Patient's last menstrual period was 2025.       Erika Jenkins is a 50 y.o.  WF who presents today for heavy crampy menses       Allergies:   Review of patient's allergies indicates:   Allergen Reactions    Codeine     Penicillins Rash       Past Medical History:   Diagnosis Date    Allergy     Anxiety disorder, unspecified     Asthma     Diverticulitis     Gastro-esophageal reflux disease without esophagitis     Hyperlipidemia     Hypertension     Kidney stones     Obesity, unspecified        Past Surgical History:   Procedure Laterality Date    CHOLECYSTECTOMY      COLONOSCOPY W/ POLYPECTOMY  2024    Dr Jase Johnston    CREATION OF MUSCLE ROTATIONAL FLAP Right 2024    Procedure: ROTATIONAL STERNOTHYROID MUSCLE FLAP;  Surgeon: Carlos Raymond DO;  Location: [facility] OR [address] [place] [address];  Service: Plastics;  Laterality: Right;    ELBOW SURGERY Left 2013    PARATHYROIDECTOMY  2024    Procedure: PARATHYROIDECTOMY, Partial excision right superior parathyroid tumor;  Surgeon: Zonia Quesada MD;  Location: [facility] OR [address] [place] [address];  Service: General;;    PARATHYROIDECTOMY Right 2024    Procedure: PARATHYROID CARCINOMA RESECTION WITH RIGHT THYROID LOBECTOMY AND RIGHT LEVEL VI COMPARTMENT DISSECTION;  Surgeon: Zonia Quesada MD;  Location: [facility] OR [address] [place] [address];  Service: General;  Laterality: Right;    REPAIR, NERVE USING ALLOGRAFT Right 2024    Procedure: REPAIR, NERVE USING ALLOGRAFT;  Surgeon: Carlos Raymond DO;  Location: [facility] OR [address] [place] [address];  Service: Plastics;  Laterality: Right;    TUBAL LIGATION         MEDS: Medications Ordered Prior to Encounter[1]    OB  History          5    Para        Term                AB        Living   5         SAB        IAB        Ectopic        Multiple        Live Births                     Social History[2]    Family History   Problem Relation Name Age of Onset    Hyperlipidemia Mother      Heart disease Mother      Diabetes Mother      Arthritis Mother      Cancer Mother          Liver    Cancer Sister      Uterine cancer Sister      Cancer Maternal Grandmother      Uterine cancer Maternal Grandmother      Uterine cancer Other Great Mat. Grandmother          Past medical and surgical history reviewed.   I have reviewed the patient's medical history in detail and updated the computerized patient record.        Review of System:   General: no chills, fever, night sweats, weight gain or weight loss  Psychological: no depression or suicidal ideation  Breasts: no new or changing breast lumps, nipple discharge or masses.  Respiratory: no cough, shortness of breath, or wheezing  Cardiovascular: no chest pain or dyspnea on exertion  Gastrointestinal: no abdominal pain, change in bowel habits, or black or bloody stools  Genito-Urinary: no incontinence, urinary frequency/urgency or vulvar/vaginal symptoms, pelvic pain or abnormal vaginal bleeding.  Musculoskeletal: no gait disturbance or muscular weakness      Physical Exam:   BP (!) 178/84 (BP Location: Right arm, Patient Position: Sitting)   Wt 126.1 kg (278 lb)   LMP 2025   BMI 43.54 kg/m²   Constitutional: She appears alert and responsive. She appears well-developed, well-groomed, and well-nourished. No distress.   Abdominal: Soft. She exhibits no distension, hernias or masses. There is no tenderness. No enlargement of liver edge or spleen.  There is no rebound and no guarding.   Genitourinary:    External rectal exam shows no thrombosed external hemorrhoids, no lesions.     Pelvic exam was performed with patient supine.   No labial fusion, and symmetrical.    There is  no rash, lesion or injury on the right labia.   There is no rash, lesion or injury on the left labia.   No bleeding and no signs of injury around the vaginal introitus, urethral meatus is normal size and without prolapse or lesions, urethra well supported. The cervix is visualized with no discharge, lesions or friability.   No vaginal discharge found. Menses    No significant Cystocele, Enterocele or rectocele, and cervix and uterus well supported.   Bimanual exam:   The urethra is normal to palpation and there are no palpable vaginal wall masses.   Uterus is not deviated, not enlarged, not fixed, normal shape and not tender.   Cervix exhibits no motion tenderness.    Right adnexum displays no mass or nodularity and no tenderness.   Left adnexum displays no mass or nodularity and no tenderness.  Musculoskeletal: Normal range of motion.   Lymphadenopathy: No inguinal adenopathy present.   Neurological: She is alert and oriented to person, place, and time. Coordination normal.   Skin: Skin is warm and dry. She is not diaphoretic. No rashes, lesions or ulcers.   Psychiatric: She has a normal mood and affect, oriented to person, place, and time.      Assessment:     1. Abnormal uterine bleeding  POCT Urine Pregnancy        S/p parathyroid cancer    Plan:   CBC as ordered  UPT- negative  Depo q 1-3 mos  Mammogram  Follow up in 1 months  Consider Hyst if fails med tx.  Patient informed will be contacted with results within 2 weeks. Encouraged to please call back or email if she has not heard from us by then.         [1]   Current Outpatient Medications on File Prior to Visit   Medication Sig Dispense Refill    albuterol (PROVENTIL/VENTOLIN HFA) 90 mcg/actuation inhaler Inhale 2 puffs into the lungs 2 (two) times daily as needed for Wheezing or Shortness of Breath. 18 g 1    cholecalciferol, vitamin D3, (VITAMIN D3) 25 mcg (1,000 unit) capsule Take 5 capsules (5,000 Units total) by mouth once daily.      HUMIRA,CF, PEN  HXUD-MJ-PCDR HS 80 mg/0.8 mL-40 mg/0.4 mL PnKt Inject into the skin.      levothyroxine (SYNTHROID) 100 MCG tablet Take 1 tablet (100 mcg total) by mouth before breakfast. 30 tablet 11    LORazepam (ATIVAN) 1 MG tablet Take 1 mg by mouth as needed for Anxiety.      [DISCONTINUED] risankizumab-rzaa (SKYRIZI) 150 mg/mL PnIj Inject 150 mg into the skin. (Patient not taking: Reported on 7/9/2025)       No current facility-administered medications on file prior to visit.   [2]   Social History  Socioeconomic History    Marital status: Significant Other   Tobacco Use    Smoking status: Never     Passive exposure: Never    Smokeless tobacco: Never   Substance and Sexual Activity    Alcohol use: Not Currently    Drug use: Not Currently    Sexual activity: Yes     Partners: Male     Social Drivers of Health     Financial Resource Strain: Low Risk  (6/5/2025)    Received from [facility]    Overall Financial Resource Strain (CARDIA)     Difficulty of Paying Living Expenses: Not hard at all   Food Insecurity: No Food Insecurity (6/5/2025)    Received from [facility]    Hunger Vital Sign     Worried About Running Out of Food in the Last Year: Never true     Ran Out of Food in the Last Year: Never true   Transportation Needs: No Transportation Needs (6/5/2025)    Received from [facility]    PRAPARE - Transportation     Lack of Transportation (Medical): No     Lack of Transportation (Non-Medical): No   Physical Activity: Sufficiently Active (6/5/2025)    Received from [facility]    Exercise Vital Sign     Days of Exercise per Week: 4 days     Minutes of Exercise per Session: 40 min   Stress: No Stress Concern Present (6/5/2025)    Received from [facility]    [employer] - Occupational Stress Questionnaire     Feeling of Stress : Not at all   Recent Concern: Stress - Stress Concern Present (4/2/2025)    [employer] - Occupational Stress Questionnaire     Feeling of  Stress : Very much   Housing Stability: Unknown (6/5/2025)    Received from [facility]    Housing Stability Vital Sign     Unable to Pay for Housing in the Last Year: No

## 2025-07-09 NOTE — PROGRESS NOTES
Verified two patient identifiers. Allergies verified. Pt given injection to Right Upper Outer Quad Gluteus, tolerated well.  Patient advised to wait 15mins in lobby for observation and to report any adverse reactions. Patient verbalized understanding. Pt given next injection date(s) 1 month-pt to schedule

## 2025-07-10 ENCOUNTER — PATIENT MESSAGE (OUTPATIENT)
Dept: SURGERY | Facility: CLINIC | Age: 50
End: 2025-07-10
Payer: MEDICAID

## 2025-08-01 ENCOUNTER — LAB VISIT (OUTPATIENT)
Dept: LAB | Facility: HOSPITAL | Age: 50
End: 2025-08-01
Attending: STUDENT IN AN ORGANIZED HEALTH CARE EDUCATION/TRAINING PROGRAM
Payer: MEDICAID

## 2025-08-01 DIAGNOSIS — C75.0 PARATHYROID CARCINOMA: ICD-10-CM

## 2025-08-01 LAB
25(OH)D3+25(OH)D2 SERPL-MCNC: 42 NG/ML (ref 30–96)
ALBUMIN SERPL BCP-MCNC: 4 G/DL (ref 3.5–5.2)
ANION GAP (OHS): 8 MMOL/L (ref 8–16)
BUN SERPL-MCNC: 15 MG/DL (ref 6–20)
CALCIUM SERPL-MCNC: 9.3 MG/DL (ref 8.7–10.5)
CHLORIDE SERPL-SCNC: 110 MMOL/L (ref 95–110)
CO2 SERPL-SCNC: 20 MMOL/L (ref 23–29)
CREAT SERPL-MCNC: 0.9 MG/DL (ref 0.5–1.4)
GFR SERPLBLD CREATININE-BSD FMLA CKD-EPI: >60 ML/MIN/1.73/M2
GLUCOSE SERPL-MCNC: 99 MG/DL (ref 70–110)
MAGNESIUM SERPL-MCNC: 1.9 MG/DL (ref 1.6–2.6)
PHOSPHATE SERPL-MCNC: 3.3 MG/DL (ref 2.7–4.5)
POTASSIUM SERPL-SCNC: 4.1 MMOL/L (ref 3.5–5.1)
SODIUM SERPL-SCNC: 138 MMOL/L (ref 136–145)
TSH SERPL-ACNC: 2.03 UIU/ML (ref 0.4–4)

## 2025-08-01 PROCEDURE — 83970 ASSAY OF PARATHORMONE: CPT

## 2025-08-01 PROCEDURE — 80069 RENAL FUNCTION PANEL: CPT

## 2025-08-01 PROCEDURE — 82306 VITAMIN D 25 HYDROXY: CPT

## 2025-08-01 PROCEDURE — 83735 ASSAY OF MAGNESIUM: CPT

## 2025-08-01 PROCEDURE — 36415 COLL VENOUS BLD VENIPUNCTURE: CPT | Mod: PO

## 2025-08-01 PROCEDURE — 84443 ASSAY THYROID STIM HORMONE: CPT

## 2025-08-04 LAB — PTH-INTACT SERPL-MCNC: 43.8 PG/ML (ref 9–77)

## 2025-08-06 ENCOUNTER — PATIENT MESSAGE (OUTPATIENT)
Dept: SURGERY | Facility: CLINIC | Age: 50
End: 2025-08-06
Payer: MEDICAID

## 2025-08-07 NOTE — TELEPHONE ENCOUNTER
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    Labs reviewed, calcium, phos, albumin, magnesium, vitamin D and PTH all normal.  Patient requested information about the abnormal results.    Component      Latest Ref Rng 8/1/2025   Sodium      136 - 145 mmol/L 138    Potassium      3.5 - 5.1 mmol/L 4.1    Chloride      95 - 110 mmol/L 110    CO2      23 - 29 mmol/L 20 (L)    Glucose      70 - 110 mg/dL 99    BUN      6 - 20 mg/dL 15    Creatinine      0.5 - 1.4 mg/dL 0.9    Calcium      8.7 - 10.5 mg/dL 9.3    Anion Gap      8 - 16 mmol/L 8    eGFR      >60 mL/min/1.73/m2 >60    TSH      0.400 - 4.000 uIU/mL 2.027    Albumin      3.5 - 5.2 g/dL 4.0    Magnesium       1.6 - 2.6 mg/dL 1.9    Phosphorus Level      2.7 - 4.5 mg/dL 3.3    Vitamin D      30 - 96 ng/mL 42    PTH      9.0 - 77.0 pg/mL 43.8       These labs have been ordered and obtained locally per [facility] request and have been forwarded to:     [facility]   Reynold Chavis MD   [address] [place] [address], [place] [address]   Phone: tel:[phone]   fax:[phone]

## (undated) DEVICE — NDL HYPO REG 25G X 1 1/2

## (undated) DEVICE — SEALANT VISTASEAL FIBRIN 10ML

## (undated) DEVICE — PROBE SIMULATOR KRAFF

## (undated) DEVICE — SUT VICRYL 6-0 P1 18IN UD

## (undated) DEVICE — SUT LIGACLIP SMALL XTRA

## (undated) DEVICE — ELECTRODE EMG NEUROVISION

## (undated) DEVICE — Device

## (undated) DEVICE — GOWN AERO CHROME W/ TOWEL XL

## (undated) DEVICE — DRAPE STERI INSTRUMENT 1018

## (undated) DEVICE — CHLORAPREP 10.5 ML APPLICATOR

## (undated) DEVICE — SPONGE LAP 18X18 PREWASHED

## (undated) DEVICE — EVACUATOR WOUND BULB 100CC

## (undated) DEVICE — ADHESIVE DERMABOND ADVANCED

## (undated) DEVICE — SUT 3/0 30IN ETHILON BLK M

## (undated) DEVICE — ELECTRODE REM PLYHSV RETURN 9

## (undated) DEVICE — HEMOSTAT SURGICEL FIBRLR 1X2IN

## (undated) DEVICE — SPONGE GAUZE 16PLY 4X4

## (undated) DEVICE — SUT 4-0 12-18IN SILK BLACK

## (undated) DEVICE — CONTAINER SPECIMEN OR STER 4OZ

## (undated) DEVICE — BLADE SURG CARBON STEEL #10

## (undated) DEVICE — DRAPE EENT SPLIT STERILE

## (undated) DEVICE — GLOVE BIOGEL PI MICRO SZ 7

## (undated) DEVICE — SUT 3-0 12-18IN SILK

## (undated) DEVICE — PAD CURAD NONADH 3X4IN

## (undated) DEVICE — DRAPE INSTR MAGNETIC 10X16IN

## (undated) DEVICE — TUBE EMG NIM 7.0MM TRIVANTAGE

## (undated) DEVICE — DISSECTOR LIGASURE EXACT 21CM

## (undated) DEVICE — ELECTRODE BLADE INSULATED 1 IN

## (undated) DEVICE — TRAY MINOR GEN SURG OMC

## (undated) DEVICE — SUT VICRYL 3-0 27 SH

## (undated) DEVICE — SUT VICRYL 4-0 RB1 27IN UD

## (undated) DEVICE — GOWN ORBIS LVL 4 XXL 49IN

## (undated) DEVICE — DRESSING TRANS 4X4 TEGADERM

## (undated) DEVICE — CORD BIPOLAR 12 FOOT

## (undated) DEVICE — DRAPE HALF SURGICAL 40X58IN

## (undated) DEVICE — GAUZE SPONGE PEANUT STRL

## (undated) DEVICE — TIP YANKAUERS BULB NO VENT

## (undated) DEVICE — CLIP LIGACLIP XTRA TITANIUM

## (undated) DEVICE — GOWN SURGICAL X-LARGE

## (undated) DEVICE — SUT PROLENE 5/0 RB-1 36 IN

## (undated) DEVICE — MARKER SKIN RULER STERILE

## (undated) DEVICE — DRAPE CORETEMP FLD WRM 56X62IN

## (undated) DEVICE — MARKER FN REG DUAL UTIL RULER